# Patient Record
Sex: FEMALE | Race: WHITE | URBAN - METROPOLITAN AREA
[De-identification: names, ages, dates, MRNs, and addresses within clinical notes are randomized per-mention and may not be internally consistent; named-entity substitution may affect disease eponyms.]

---

## 2018-04-30 ENCOUNTER — EMERGENCY (EMERGENCY)
Facility: HOSPITAL | Age: 83
LOS: 0 days | Discharge: HOME | End: 2018-04-30
Attending: EMERGENCY MEDICINE | Admitting: EMERGENCY MEDICINE
Payer: MEDICARE

## 2018-04-30 VITALS
TEMPERATURE: 98 F | OXYGEN SATURATION: 100 % | DIASTOLIC BLOOD PRESSURE: 68 MMHG | HEART RATE: 89 BPM | SYSTOLIC BLOOD PRESSURE: 152 MMHG

## 2018-04-30 VITALS — WEIGHT: 100.09 LBS

## 2018-04-30 DIAGNOSIS — I10 ESSENTIAL (PRIMARY) HYPERTENSION: ICD-10-CM

## 2018-04-30 DIAGNOSIS — K59.00 CONSTIPATION, UNSPECIFIED: ICD-10-CM

## 2018-04-30 DIAGNOSIS — K62.89 OTHER SPECIFIED DISEASES OF ANUS AND RECTUM: ICD-10-CM

## 2018-04-30 PROCEDURE — 99285 EMERGENCY DEPT VISIT HI MDM: CPT

## 2018-04-30 RX ORDER — MULTIVIT WITH MIN/MFOLATE/K2 340-15/3 G
50 POWDER (GRAM) ORAL ONCE
Qty: 0 | Refills: 0 | Status: COMPLETED | OUTPATIENT
Start: 2018-04-30 | End: 2018-04-30

## 2018-04-30 RX ORDER — MORPHINE SULFATE 50 MG/1
4 CAPSULE, EXTENDED RELEASE ORAL ONCE
Qty: 0 | Refills: 0 | Status: DISCONTINUED | OUTPATIENT
Start: 2018-04-30 | End: 2018-04-30

## 2018-04-30 RX ORDER — MINERAL OIL
133 OIL (ML) MISCELLANEOUS ONCE
Qty: 0 | Refills: 0 | Status: COMPLETED | OUTPATIENT
Start: 2018-04-30 | End: 2018-04-30

## 2018-04-30 RX ADMIN — Medication 133 MILLILITER(S): at 12:15

## 2018-04-30 RX ADMIN — MORPHINE SULFATE 4 MILLIGRAM(S): 50 CAPSULE, EXTENDED RELEASE ORAL at 14:19

## 2018-04-30 RX ADMIN — Medication 50 MILLILITER(S): at 15:38

## 2018-04-30 NOTE — ED ADULT NURSE NOTE - OBJECTIVE STATEMENT
C/O constipation since last Thursday. Patient reports she had a mineral oil enema without positive effect.

## 2018-04-30 NOTE — ED PROVIDER NOTE - MEDICAL DECISION MAKING DETAILS
82 y/o F c/o worsening constipation over the past x1 week with plans to attempt rectal exam with disimpaction

## 2018-04-30 NOTE — ED PROVIDER NOTE - OBJECTIVE STATEMENT
82 y/o F with a PMHx of HTN on Valsartan, external and internal hemorrhoids, s/p colonoscopy (last 2-3 years ago) presents to the ED c/o constipation with rectal pain x3 days. Pt states x3-4 days ago she had onset of constipation with external and internal hemorrhoids which have been worsening, and still has not been able to have a BM. Pt states that she is experiencing severe rectal pain, unable to stand up straight of ambulate secondary to pain. Pt states that she feels pressure when she stands with sensation to have BM, attempts with no alleviation. Pt currently calm, no distress and denies fever, cough, chills, NV, SOB, CP or any other acute c/o at this time. PHILIP Bowie.

## 2018-04-30 NOTE — ED PROVIDER NOTE - GASTROINTESTINAL, MLM
Abdomen soft, non-tender, no guarding. Abdomen soft, non-tender, no guarding. Large amt of soft brown stool in vault. rectum inflamed.

## 2018-04-30 NOTE — ED PROVIDER NOTE - PROGRESS NOTE DETAILS
Rectal exam, Guaiac Rectal exam, Guaiac negative md Sophie pt manually disempacted, followed by enema. MD Trish given morphine and second rectal disempaction done. more stool obtained. pt mart proc well MD Trish

## 2018-04-30 NOTE — ED STATDOCS - PROGRESS NOTE DETAILS
Renetta CONNER for ED attending, Dr. Mccartney: 82 y/o female with a PMHx of HTN on Valsartan, external and internal hemorrhoids, s/p colonoscopy (last 2-3 years ago) presents to the ED c/o constipation with rectal pain x3 days. Pt states 3-4 days ago she had onset of constipation with external and internal hemorrhoids. Since, pt's hemorrhoids have been worsening, and still has not been able to have a BM. +rectal pain. Pt is unable to walk due to rectal pain. Pt took stool softeners. On Baclofen for hand cramping. Allergic to Sulfa, Penicillin. PMD: Dr. Herbert Bowie.

## 2018-06-18 PROBLEM — Z00.00 ENCOUNTER FOR PREVENTIVE HEALTH EXAMINATION: Status: ACTIVE | Noted: 2018-06-18

## 2018-06-19 ENCOUNTER — OUTPATIENT (OUTPATIENT)
Dept: OUTPATIENT SERVICES | Facility: HOSPITAL | Age: 83
LOS: 1 days | End: 2018-06-19
Payer: MEDICARE

## 2018-06-19 ENCOUNTER — APPOINTMENT (OUTPATIENT)
Dept: CT IMAGING | Facility: CLINIC | Age: 83
End: 2018-06-19
Payer: MEDICARE

## 2018-06-19 DIAGNOSIS — Z00.8 ENCOUNTER FOR OTHER GENERAL EXAMINATION: ICD-10-CM

## 2018-06-19 PROCEDURE — 74176 CT ABD & PELVIS W/O CONTRAST: CPT

## 2018-06-19 PROCEDURE — 74176 CT ABD & PELVIS W/O CONTRAST: CPT | Mod: 26

## 2019-04-11 PROBLEM — I10 ESSENTIAL (PRIMARY) HYPERTENSION: Chronic | Status: ACTIVE | Noted: 2018-04-30

## 2019-04-11 PROBLEM — K64.9 UNSPECIFIED HEMORRHOIDS: Chronic | Status: ACTIVE | Noted: 2018-04-30

## 2019-05-07 ENCOUNTER — RECORD ABSTRACTING (OUTPATIENT)
Age: 84
End: 2019-05-07

## 2019-05-07 DIAGNOSIS — K58.9 IRRITABLE BOWEL SYNDROME W/OUT DIARRHEA: ICD-10-CM

## 2019-05-07 DIAGNOSIS — I99.8 OTHER DISORDER OF CIRCULATORY SYSTEM: ICD-10-CM

## 2019-05-07 DIAGNOSIS — Z80.0 FAMILY HISTORY OF MALIGNANT NEOPLASM OF DIGESTIVE ORGANS: ICD-10-CM

## 2019-05-07 DIAGNOSIS — J18.9 PNEUMONIA, UNSPECIFIED ORGANISM: ICD-10-CM

## 2019-05-07 DIAGNOSIS — M54.9 DORSALGIA, UNSPECIFIED: ICD-10-CM

## 2019-05-07 DIAGNOSIS — Z83.3 FAMILY HISTORY OF DIABETES MELLITUS: ICD-10-CM

## 2019-05-07 DIAGNOSIS — Z78.9 OTHER SPECIFIED HEALTH STATUS: ICD-10-CM

## 2019-05-07 DIAGNOSIS — K52.9 NONINFECTIVE GASTROENTERITIS AND COLITIS, UNSPECIFIED: ICD-10-CM

## 2019-05-07 DIAGNOSIS — C44.90 UNSPECIFIED MALIGNANT NEOPLASM OF SKIN, UNSPECIFIED: ICD-10-CM

## 2019-05-07 DIAGNOSIS — Z87.19 PERSONAL HISTORY OF OTHER DISEASES OF THE DIGESTIVE SYSTEM: ICD-10-CM

## 2019-05-07 DIAGNOSIS — Q87.1 CONGENITAL MALFORMATION SYNDROMES PREDOMINANTLY ASSOCIATED WITH SHORT STATURE: ICD-10-CM

## 2019-05-07 DIAGNOSIS — L43.9 LICHEN PLANUS, UNSPECIFIED: ICD-10-CM

## 2019-05-07 DIAGNOSIS — Z80.41 FAMILY HISTORY OF MALIGNANT NEOPLASM OF OVARY: ICD-10-CM

## 2019-05-07 DIAGNOSIS — M19.90 UNSPECIFIED OSTEOARTHRITIS, UNSPECIFIED SITE: ICD-10-CM

## 2019-05-07 DIAGNOSIS — I10 ESSENTIAL (PRIMARY) HYPERTENSION: ICD-10-CM

## 2019-05-07 RX ORDER — ATENOLOL 25 MG/1
25 TABLET ORAL
Refills: 0 | Status: ACTIVE | COMMUNITY

## 2019-05-07 RX ORDER — VALSARTAN 160 MG/1
160 TABLET ORAL
Refills: 0 | Status: ACTIVE | COMMUNITY

## 2019-05-07 RX ORDER — BACLOFEN 10 MG/1
10 TABLET ORAL
Refills: 0 | Status: ACTIVE | COMMUNITY

## 2019-05-07 RX ORDER — POLYETHYLENE GLYCOL 3350, SODIUM SULFATE, SODIUM CHLORIDE, POTASSIUM CHLORIDE, ASCORBIC ACID, SODIUM ASCORBATE 7.5-2.691G
KIT ORAL
Refills: 0 | Status: ACTIVE | COMMUNITY

## 2019-06-03 ENCOUNTER — APPOINTMENT (OUTPATIENT)
Dept: GASTROENTEROLOGY | Facility: CLINIC | Age: 84
End: 2019-06-03
Payer: MEDICARE

## 2019-06-03 VITALS
TEMPERATURE: 98.5 F | SYSTOLIC BLOOD PRESSURE: 194 MMHG | BODY MASS INDEX: 14.66 KG/M2 | HEIGHT: 65 IN | HEART RATE: 85 BPM | DIASTOLIC BLOOD PRESSURE: 85 MMHG | WEIGHT: 88 LBS

## 2019-06-03 DIAGNOSIS — R13.10 DYSPHAGIA, UNSPECIFIED: ICD-10-CM

## 2019-06-03 DIAGNOSIS — C19 MALIGNANT NEOPLASM OF RECTOSIGMOID JUNCTION: ICD-10-CM

## 2019-06-03 DIAGNOSIS — K64.4 RESIDUAL HEMORRHOIDAL SKIN TAGS: ICD-10-CM

## 2019-06-03 DIAGNOSIS — K21.9 GASTRO-ESOPHAGEAL REFLUX DISEASE W/OUT ESOPHAGITIS: ICD-10-CM

## 2019-06-03 PROCEDURE — 99204 OFFICE O/P NEW MOD 45 MIN: CPT

## 2019-06-03 RX ORDER — HYDROCORTISONE ACETATE AND PRAMOXINE HYDROCHLORIDE 25; 10 MG/G; MG/G
2.5-1 CREAM TOPICAL
Qty: 1 | Refills: 3 | Status: ACTIVE | COMMUNITY
Start: 2019-06-03 | End: 1900-01-01

## 2019-06-03 RX ORDER — HYDROCORTISONE 25 MG/G
2.5 OINTMENT TOPICAL
Qty: 2835 | Refills: 0 | Status: ACTIVE | COMMUNITY
Start: 2019-01-22

## 2019-06-03 RX ORDER — PRAMOXINE HYDROCHLORIDE AND HYDROCORTISONE ACETATE 10; 25 MG/G; MG/G
2.5-1 CREAM TOPICAL
Qty: 48 | Refills: 0 | Status: ACTIVE | COMMUNITY
Start: 2018-12-05

## 2019-06-03 RX ORDER — IRBESARTAN 150 MG/1
150 TABLET ORAL
Qty: 60 | Refills: 0 | Status: ACTIVE | COMMUNITY
Start: 2018-12-13

## 2019-06-03 RX ORDER — SODIUM SULFATE, POTASSIUM SULFATE, MAGNESIUM SULFATE 17.5; 3.13; 1.6 G/ML; G/ML; G/ML
17.5-3.13-1.6 SOLUTION, CONCENTRATE ORAL
Qty: 1 | Refills: 0 | Status: ACTIVE | COMMUNITY
Start: 2019-06-03 | End: 1900-01-01

## 2019-06-03 NOTE — REVIEW OF SYSTEMS
[Feeling Tired] : feeling tired [Recent Weight Gain (___ Lbs)] : recent [unfilled] ~Ulb weight gain [Eye Pain] : no eye pain [Red Eyes] : eyes not red [Eyesight Problems] : eyesight problems [Vomiting] : no vomiting [Discharge From Eyes] : no purulent discharge from the eyes [Constipation] : constipation [Diarrhea] : no diarrhea [Melena] : no melena [Heartburn] : heartburn [Joint Pain] : joint pain [Joint Stiffness] : joint stiffness [Limb Pain] : no limb pain [Joint Swelling] : no joint swelling [Confused] : no confusion [Dizziness] : dizziness [Convulsions] : no convulsions [Limb Weakness] : no limb weakness [Difficulty Walking] : difficulty walking [Negative] : Heme/Lymph [FreeTextEntry2] : 20 lbs last year due to stress,  death [FreeTextEntry9] : left hip arthritis

## 2019-06-03 NOTE — HISTORY OF PRESENT ILLNESS
[de-identified] : This is an 84-year-old female referred for evaluation of multiple GI symptoms. #1 she has a strong family history of malignancy, including esophageal and colon cancers in first-degree relatives. She has not had a colonoscopy in nearly 10 years. #2 she notes increasing intermittent dysphagia and odynophagia to large solids. No food has never gotten completely stalk although she does notice some occasional GERD and heartburn. #3 she has a history of increasing constipation with a history of partial obstruction. Currently, she is having regular bowel movements by taking 4 release daily and taking Senokot as needed. #4 she has some very minimal discomfort from known history of internal and external hemorrhoids.

## 2019-06-03 NOTE — ASSESSMENT
[FreeTextEntry1] : This is an 84-year-old female referred for evaluation of multiple GI issues, \par #1 patient with a history in her family of colon cancer in her mother, and she has not had a colonoscopy in nearly 10 years. We will schedule colonoscopy and this has been discussed with her in detail. \par #2 constipation - She is currently managing her symptoms well with over-the-counter regimen. She does not wish to pursue further medication therapy at this time, although we did discuss Linzess etc.\par . #3 Bloating and has -  we have discussed the possibility of her adding Iberogast to her regimen. #4 dysphagia. We've agreed to schedule an upper endoscopy to further evaluate, as she also has a history of esophageal cancer in her family. She does note intermittent GERD as well.

## 2019-06-03 NOTE — PHYSICAL EXAM
[Sclera] : the sclera and conjunctiva were normal [Extraocular Movements] : extraocular movements were intact [PERRL With Normal Accommodation] : pupils were equal in size, round, and reactive to light [Outer Ear] : the ears and nose were normal in appearance [Oropharynx] : the oropharynx was normal [Neck Appearance] : the appearance of the neck was normal [Neck Cervical Mass (___cm)] : no neck mass was observed [Jugular Venous Distention Increased] : there was no jugular-venous distention [Thyroid Diffuse Enlargement] : the thyroid was not enlarged [Thyroid Nodule] : there were no palpable thyroid nodules [Auscultation Breath Sounds / Voice Sounds] : lungs were clear to auscultation bilaterally [Heart Rate And Rhythm] : heart rate was normal and rhythm regular [Heart Sounds] : normal S1 and S2 [Heart Sounds Gallop] : no gallops [Murmurs] : no murmurs [Heart Sounds Pericardial Friction Rub] : no pericardial rub [Full Pulse] : the pedal pulses are present [Edema] : there was no peripheral edema [Bowel Sounds] : normal bowel sounds [Abdomen Soft] : soft [Abdomen Tenderness] : non-tender [Abdomen Mass (___ Cm)] : no abdominal mass palpated [Abnormal Walk] : normal gait [Nail Clubbing] : no clubbing  or cyanosis of the fingernails [Musculoskeletal - Swelling] : no joint swelling seen [Motor Tone] : muscle strength and tone were normal [FreeTextEntry1] : slow gait, currently steady [Skin Color & Pigmentation] : normal skin color and pigmentation [Skin Turgor] : normal skin turgor [] : no rash [Oriented To Time, Place, And Person] : oriented to person, place, and time [Affect] : the affect was normal [Impaired Insight] : insight and judgment were intact

## 2019-09-05 ENCOUNTER — APPOINTMENT (OUTPATIENT)
Dept: NEUROSURGERY | Facility: CLINIC | Age: 84
End: 2019-09-05
Payer: MEDICARE

## 2019-09-05 VITALS
WEIGHT: 89 LBS | HEART RATE: 78 BPM | OXYGEN SATURATION: 95 % | SYSTOLIC BLOOD PRESSURE: 193 MMHG | HEIGHT: 65 IN | RESPIRATION RATE: 18 BRPM | TEMPERATURE: 98.2 F | DIASTOLIC BLOOD PRESSURE: 99 MMHG | BODY MASS INDEX: 14.83 KG/M2

## 2019-09-05 PROCEDURE — 99205 OFFICE O/P NEW HI 60 MIN: CPT

## 2019-09-05 NOTE — PHYSICAL EXAM
[General Appearance - Alert] : alert [General Appearance - In No Acute Distress] : in no acute distress [Person] : oriented to person [Place] : oriented to place [Motor Strength] : muscle strength was normal in all four extremities [Time] : oriented to time [Involuntary Movements] : no involuntary movements were seen [Abnormal Walk] : normal gait [] : no respiratory distress

## 2019-09-09 NOTE — HISTORY OF PRESENT ILLNESS
[de-identified] : Kathy Rodriguez is an 84yr old right handed female here for a new patient visit. On August 2, 2019 she has severe hypertension with systolic in the 200s with associated flashing lights and went to the hospital. Work up there included MRI and MRA brain. The MRA brain was significant for a cerebral aneurysm and she was referred to us for further management. No family history of cerebral aneurysm and no social history of smoking. \par Neurologist: Dr. Lucas Soni

## 2019-09-09 NOTE — ASSESSMENT
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 Herbert Ramirez\par Ok to exercise\par NO neurosurgical contraindication to proceeding with other medical concern such as endoscopy and colonoscopy \par

## 2019-09-09 NOTE — REVIEW OF SYSTEMS
[Feeling Tired] : feeling tired [Numbness] : numbness [Abnormal Sensation] : an abnormal sensation [Tingling] : tingling [Dizziness] : dizziness [Negative] : Heme/Lymph [de-identified] : headaches

## 2020-10-14 ENCOUNTER — INPATIENT (INPATIENT)
Facility: HOSPITAL | Age: 85
LOS: 10 days | Discharge: HOME HEALTH | DRG: 121 | End: 2020-10-25
Attending: THORACIC SURGERY (CARDIOTHORACIC VASCULAR SURGERY) | Admitting: SURGERY
Payer: COMMERCIAL

## 2020-10-14 VITALS
DIASTOLIC BLOOD PRESSURE: 85 MMHG | SYSTOLIC BLOOD PRESSURE: 212 MMHG | TEMPERATURE: 99 F | OXYGEN SATURATION: 100 % | WEIGHT: 100.09 LBS | HEART RATE: 85 BPM | RESPIRATION RATE: 19 BRPM

## 2020-10-14 DIAGNOSIS — S22.20XA UNSPECIFIED FRACTURE OF STERNUM, INITIAL ENCOUNTER FOR CLOSED FRACTURE: ICD-10-CM

## 2020-10-14 LAB
ALBUMIN SERPL ELPH-MCNC: 3.7 G/DL — SIGNIFICANT CHANGE UP (ref 3.3–5)
ALP SERPL-CCNC: 72 U/L — SIGNIFICANT CHANGE UP (ref 40–120)
ALT FLD-CCNC: 52 U/L — SIGNIFICANT CHANGE UP (ref 12–78)
ANION GAP SERPL CALC-SCNC: 5 MMOL/L — SIGNIFICANT CHANGE UP (ref 5–17)
APPEARANCE UR: CLEAR — SIGNIFICANT CHANGE UP
APTT BLD: 28.7 SEC — SIGNIFICANT CHANGE UP (ref 27.5–35.5)
AST SERPL-CCNC: 56 U/L — HIGH (ref 15–37)
BASOPHILS # BLD AUTO: 0.06 K/UL — SIGNIFICANT CHANGE UP (ref 0–0.2)
BASOPHILS NFR BLD AUTO: 0.7 % — SIGNIFICANT CHANGE UP (ref 0–2)
BILIRUB SERPL-MCNC: 0.4 MG/DL — SIGNIFICANT CHANGE UP (ref 0.2–1.2)
BILIRUB UR-MCNC: NEGATIVE — SIGNIFICANT CHANGE UP
BUN SERPL-MCNC: 17 MG/DL — SIGNIFICANT CHANGE UP (ref 7–23)
CALCIUM SERPL-MCNC: 9.6 MG/DL — SIGNIFICANT CHANGE UP (ref 8.5–10.1)
CHLORIDE SERPL-SCNC: 104 MMOL/L — SIGNIFICANT CHANGE UP (ref 96–108)
CO2 SERPL-SCNC: 29 MMOL/L — SIGNIFICANT CHANGE UP (ref 22–31)
COLOR SPEC: YELLOW — SIGNIFICANT CHANGE UP
CREAT SERPL-MCNC: 0.82 MG/DL — SIGNIFICANT CHANGE UP (ref 0.5–1.3)
DIFF PNL FLD: NEGATIVE — SIGNIFICANT CHANGE UP
EOSINOPHIL # BLD AUTO: 0.15 K/UL — SIGNIFICANT CHANGE UP (ref 0–0.5)
EOSINOPHIL NFR BLD AUTO: 1.7 % — SIGNIFICANT CHANGE UP (ref 0–6)
GLUCOSE SERPL-MCNC: 121 MG/DL — HIGH (ref 70–99)
GLUCOSE UR QL: NEGATIVE MG/DL — SIGNIFICANT CHANGE UP
HCT VFR BLD CALC: 37.4 % — SIGNIFICANT CHANGE UP (ref 34.5–45)
HGB BLD-MCNC: 12.2 G/DL — SIGNIFICANT CHANGE UP (ref 11.5–15.5)
IMM GRANULOCYTES NFR BLD AUTO: 1.3 % — SIGNIFICANT CHANGE UP (ref 0–1.5)
INR BLD: 0.99 RATIO — SIGNIFICANT CHANGE UP (ref 0.88–1.16)
KETONES UR-MCNC: NEGATIVE — SIGNIFICANT CHANGE UP
LEUKOCYTE ESTERASE UR-ACNC: NEGATIVE — SIGNIFICANT CHANGE UP
LYMPHOCYTES # BLD AUTO: 1.54 K/UL — SIGNIFICANT CHANGE UP (ref 1–3.3)
LYMPHOCYTES # BLD AUTO: 17.9 % — SIGNIFICANT CHANGE UP (ref 13–44)
MCHC RBC-ENTMCNC: 29.5 PG — SIGNIFICANT CHANGE UP (ref 27–34)
MCHC RBC-ENTMCNC: 32.6 GM/DL — SIGNIFICANT CHANGE UP (ref 32–36)
MCV RBC AUTO: 90.6 FL — SIGNIFICANT CHANGE UP (ref 80–100)
MONOCYTES # BLD AUTO: 0.56 K/UL — SIGNIFICANT CHANGE UP (ref 0–0.9)
MONOCYTES NFR BLD AUTO: 6.5 % — SIGNIFICANT CHANGE UP (ref 2–14)
NEUTROPHILS # BLD AUTO: 6.17 K/UL — SIGNIFICANT CHANGE UP (ref 1.8–7.4)
NEUTROPHILS NFR BLD AUTO: 71.9 % — SIGNIFICANT CHANGE UP (ref 43–77)
NITRITE UR-MCNC: NEGATIVE — SIGNIFICANT CHANGE UP
PH UR: 7 — SIGNIFICANT CHANGE UP (ref 5–8)
PLATELET # BLD AUTO: 178 K/UL — SIGNIFICANT CHANGE UP (ref 150–400)
POTASSIUM SERPL-MCNC: 3.7 MMOL/L — SIGNIFICANT CHANGE UP (ref 3.5–5.3)
POTASSIUM SERPL-SCNC: 3.7 MMOL/L — SIGNIFICANT CHANGE UP (ref 3.5–5.3)
PROT SERPL-MCNC: 6.9 GM/DL — SIGNIFICANT CHANGE UP (ref 6–8.3)
PROT UR-MCNC: NEGATIVE MG/DL — SIGNIFICANT CHANGE UP
PROTHROM AB SERPL-ACNC: 11.6 SEC — SIGNIFICANT CHANGE UP (ref 10.6–13.6)
RBC # BLD: 4.13 M/UL — SIGNIFICANT CHANGE UP (ref 3.8–5.2)
RBC # FLD: 14.4 % — SIGNIFICANT CHANGE UP (ref 10.3–14.5)
SARS-COV-2 RNA SPEC QL NAA+PROBE: SIGNIFICANT CHANGE UP
SODIUM SERPL-SCNC: 138 MMOL/L — SIGNIFICANT CHANGE UP (ref 135–145)
SP GR SPEC: 1.01 — SIGNIFICANT CHANGE UP (ref 1.01–1.02)
TROPONIN I SERPL-MCNC: <0.015 NG/ML — SIGNIFICANT CHANGE UP (ref 0.01–0.04)
UROBILINOGEN FLD QL: NEGATIVE MG/DL — SIGNIFICANT CHANGE UP
WBC # BLD: 8.59 K/UL — SIGNIFICANT CHANGE UP (ref 3.8–10.5)
WBC # FLD AUTO: 8.59 K/UL — SIGNIFICANT CHANGE UP (ref 3.8–10.5)

## 2020-10-14 PROCEDURE — 84484 ASSAY OF TROPONIN QUANT: CPT

## 2020-10-14 PROCEDURE — C1889: CPT

## 2020-10-14 PROCEDURE — 71045 X-RAY EXAM CHEST 1 VIEW: CPT

## 2020-10-14 PROCEDURE — 74177 CT ABD & PELVIS W/CONTRAST: CPT | Mod: 26

## 2020-10-14 PROCEDURE — C1713: CPT

## 2020-10-14 PROCEDURE — 36415 COLL VENOUS BLD VENIPUNCTURE: CPT

## 2020-10-14 PROCEDURE — 93010 ELECTROCARDIOGRAM REPORT: CPT

## 2020-10-14 PROCEDURE — 72125 CT NECK SPINE W/O DYE: CPT | Mod: 26

## 2020-10-14 PROCEDURE — 85025 COMPLETE CBC W/AUTO DIFF WBC: CPT

## 2020-10-14 PROCEDURE — 94640 AIRWAY INHALATION TREATMENT: CPT

## 2020-10-14 PROCEDURE — 97116 GAIT TRAINING THERAPY: CPT | Mod: GP

## 2020-10-14 PROCEDURE — 80048 BASIC METABOLIC PNL TOTAL CA: CPT

## 2020-10-14 PROCEDURE — 97162 PT EVAL MOD COMPLEX 30 MIN: CPT | Mod: GP

## 2020-10-14 PROCEDURE — 99223 1ST HOSP IP/OBS HIGH 75: CPT

## 2020-10-14 PROCEDURE — 99253 IP/OBS CNSLTJ NEW/EST LOW 45: CPT | Mod: 57

## 2020-10-14 PROCEDURE — 97530 THERAPEUTIC ACTIVITIES: CPT | Mod: GP

## 2020-10-14 PROCEDURE — 85610 PROTHROMBIN TIME: CPT

## 2020-10-14 PROCEDURE — 70450 CT HEAD/BRAIN W/O DYE: CPT | Mod: 26

## 2020-10-14 PROCEDURE — C9113: CPT

## 2020-10-14 PROCEDURE — 93306 TTE W/DOPPLER COMPLETE: CPT

## 2020-10-14 PROCEDURE — 86769 SARS-COV-2 COVID-19 ANTIBODY: CPT

## 2020-10-14 PROCEDURE — 80053 COMPREHEN METABOLIC PANEL: CPT

## 2020-10-14 PROCEDURE — 90715 TDAP VACCINE 7 YRS/> IM: CPT

## 2020-10-14 PROCEDURE — 97163 PT EVAL HIGH COMPLEX 45 MIN: CPT | Mod: GP

## 2020-10-14 PROCEDURE — 85730 THROMBOPLASTIN TIME PARTIAL: CPT

## 2020-10-14 PROCEDURE — 93306 TTE W/DOPPLER COMPLETE: CPT | Mod: 26

## 2020-10-14 PROCEDURE — 93005 ELECTROCARDIOGRAM TRACING: CPT

## 2020-10-14 PROCEDURE — 85027 COMPLETE CBC AUTOMATED: CPT

## 2020-10-14 PROCEDURE — 71260 CT THORAX DX C+: CPT | Mod: 26

## 2020-10-14 RX ORDER — LIDOCAINE 4 G/100G
1 CREAM TOPICAL EVERY 24 HOURS
Refills: 0 | Status: DISCONTINUED | OUTPATIENT
Start: 2020-10-14 | End: 2020-10-16

## 2020-10-14 RX ORDER — SODIUM CHLORIDE 9 MG/ML
1000 INJECTION INTRAMUSCULAR; INTRAVENOUS; SUBCUTANEOUS
Refills: 0 | Status: DISCONTINUED | OUTPATIENT
Start: 2020-10-14 | End: 2020-10-16

## 2020-10-14 RX ORDER — IBUPROFEN 200 MG
600 TABLET ORAL EVERY 8 HOURS
Refills: 0 | Status: DISCONTINUED | OUTPATIENT
Start: 2020-10-14 | End: 2020-10-14

## 2020-10-14 RX ORDER — ACETAMINOPHEN 500 MG
650 TABLET ORAL ONCE
Refills: 0 | Status: COMPLETED | OUTPATIENT
Start: 2020-10-14 | End: 2020-10-14

## 2020-10-14 RX ORDER — MORPHINE SULFATE 50 MG/1
2 CAPSULE, EXTENDED RELEASE ORAL EVERY 6 HOURS
Refills: 0 | Status: DISCONTINUED | OUTPATIENT
Start: 2020-10-14 | End: 2020-10-16

## 2020-10-14 RX ORDER — ACETAMINOPHEN 500 MG
650 TABLET ORAL EVERY 4 HOURS
Refills: 0 | Status: DISCONTINUED | OUTPATIENT
Start: 2020-10-14 | End: 2020-10-16

## 2020-10-14 RX ORDER — HEPARIN SODIUM 5000 [USP'U]/ML
5000 INJECTION INTRAVENOUS; SUBCUTANEOUS EVERY 8 HOURS
Refills: 0 | Status: DISCONTINUED | OUTPATIENT
Start: 2020-10-14 | End: 2020-10-16

## 2020-10-14 RX ORDER — TETANUS TOXOID, REDUCED DIPHTHERIA TOXOID AND ACELLULAR PERTUSSIS VACCINE, ADSORBED 5; 2.5; 8; 8; 2.5 [IU]/.5ML; [IU]/.5ML; UG/.5ML; UG/.5ML; UG/.5ML
0.5 SUSPENSION INTRAMUSCULAR ONCE
Refills: 0 | Status: COMPLETED | OUTPATIENT
Start: 2020-10-14 | End: 2020-10-14

## 2020-10-14 RX ORDER — OXYCODONE HYDROCHLORIDE 5 MG/1
5 TABLET ORAL EVERY 4 HOURS
Refills: 0 | Status: DISCONTINUED | OUTPATIENT
Start: 2020-10-14 | End: 2020-10-16

## 2020-10-14 RX ORDER — HYDRALAZINE HCL 50 MG
10 TABLET ORAL ONCE
Refills: 0 | Status: COMPLETED | OUTPATIENT
Start: 2020-10-14 | End: 2020-10-14

## 2020-10-14 RX ORDER — SODIUM CHLORIDE 9 MG/ML
1000 INJECTION INTRAMUSCULAR; INTRAVENOUS; SUBCUTANEOUS ONCE
Refills: 0 | Status: COMPLETED | OUTPATIENT
Start: 2020-10-14 | End: 2020-10-14

## 2020-10-14 RX ORDER — IBUPROFEN 200 MG
400 TABLET ORAL EVERY 4 HOURS
Refills: 0 | Status: DISCONTINUED | OUTPATIENT
Start: 2020-10-14 | End: 2020-10-15

## 2020-10-14 RX ORDER — ACETAMINOPHEN 500 MG
1000 TABLET ORAL ONCE
Refills: 0 | Status: DISCONTINUED | OUTPATIENT
Start: 2020-10-14 | End: 2020-10-15

## 2020-10-14 RX ORDER — IBUPROFEN 200 MG
200 TABLET ORAL EVERY 4 HOURS
Refills: 0 | Status: DISCONTINUED | OUTPATIENT
Start: 2020-10-14 | End: 2020-10-14

## 2020-10-14 RX ADMIN — TETANUS TOXOID, REDUCED DIPHTHERIA TOXOID AND ACELLULAR PERTUSSIS VACCINE, ADSORBED 0.5 MILLILITER(S): 5; 2.5; 8; 8; 2.5 SUSPENSION INTRAMUSCULAR at 13:18

## 2020-10-14 RX ADMIN — SODIUM CHLORIDE 50 MILLILITER(S): 9 INJECTION INTRAMUSCULAR; INTRAVENOUS; SUBCUTANEOUS at 19:53

## 2020-10-14 RX ADMIN — Medication 400 MILLIGRAM(S): at 23:48

## 2020-10-14 RX ADMIN — Medication 650 MILLIGRAM(S): at 13:19

## 2020-10-14 RX ADMIN — SODIUM CHLORIDE 1000 MILLILITER(S): 9 INJECTION INTRAMUSCULAR; INTRAVENOUS; SUBCUTANEOUS at 13:21

## 2020-10-14 RX ADMIN — Medication 600 MILLIGRAM(S): at 14:16

## 2020-10-14 RX ADMIN — LIDOCAINE 1 PATCH: 4 CREAM TOPICAL at 14:17

## 2020-10-14 RX ADMIN — Medication 10 MILLIGRAM(S): at 17:31
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
## 2020-10-14 NOTE — ED ADULT NURSE NOTE - NSIMPLEMENTINTERV_GEN_ALL_ED
Implemented All Fall Risk Interventions:  [place] to call system. Call bell, personal items and telephone within reach. Instruct patient to call for assistance. Room bathroom lighting operational. Non-slip footwear when patient is off stretcher. Physically safe environment: no spills, clutter or unnecessary equipment. Stretcher in lowest position, wheels locked, appropriate side rails in place. Provide visual cue, wrist band, yellow gown, etc. Monitor gait and stability. Monitor for mental status changes and reorient to person, place, and time. Review medications for side effects contributing to fall risk. Reinforce activity limits and safety measures with patient and family.
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
## 2020-10-14 NOTE — H&P ADULT - NSHPPHYSICALEXAM_GEN_ALL_CORE
Vital Signs Last 24 Hrs  T(C): 36.8 (15 Oct 2020 00:00), Max: 37.6 (14 Oct 2020 19:52)  T(F): 98.3 (15 Oct 2020 00:00), Max: 99.7 (14 Oct 2020 19:52)  HR: 91 (15 Oct 2020 00:00) (85 - 102)  BP: 123/46 (15 Oct 2020 00:00) (98/57 - 220/83)  BP(mean): 65 (14 Oct 2020 18:00) (65 - 118)  RR: 19 (15 Oct 2020 00:00) (14 - 22)  SpO2: 100% (15 Oct 2020 00:00) (98% - 100%)    PHYSICAL EXAM:  Constitutional: NAD, GCS: 15/15  AOX3  Eyes:  WNL  ENMT:  WNL  Neck:  WNL, non tender  Back: Non tender  Respiratory: CTABL, Rt chest wall hematoma, skin intact. Chest wall tenderness.  Cardiovascular:  S1+S2+0  Gastrointestinal: Soft, ND , NT  Genitourinary:  WNL  Extremities: NV intact  Vascular:  Intact  Neurological: No focal neurological deficit,  CN, motor and sensory system grossly intact.  Skin: WNL  Musculoskeletal: WNL  Psychiatric: Grossly WNL
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
## 2020-10-14 NOTE — ED ADULT NURSE REASSESSMENT NOTE - NS ED NURSE REASSESS COMMENT FT1
Patient admitted to med surg. Pt resting comfortably in bed at present time. Pt remains very anxious and keeps saying how she does not want to be admitted that she has "too much to do". Pt aware that she needs to stay in the hospital for a couple of days and is amendable to staying. Comfort and safety maintained. Call bell within reach.
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
## 2020-10-14 NOTE — H&P ADULT - ASSESSMENT
85 Y old female with Rt 3,4 rib fracture, left 5 th rib fracture, displaced sternal fracture, Rt Pectoralis muscle hematoma, O2 sat stable, troponin WNL  Multimodal pain control  Neuro checks Q  4  Cervical collar: Cleared   Incentive spirometry  F/U CXR in am   Vitals, IS/OS Q 4  Diet:   ( X) as tolerated ( ) NPO  DVT/GI prophylaxis  Activity:   Ambulate with assistance  PT  Hold A/C  Resume other home med   Medical service consult  CTS consult  ECHO  Cardiology consult  Monitor hematoma.  SW for eventual D/C planning

## 2020-10-14 NOTE — ED PROVIDER NOTE - NS ED ROS FT
Constitutional: No fever or chills  Eyes: No visual changes  HEENT: No throat pain  CV: + chest pain  Resp: No SOB no cough  GI: No abd pain, nausea or vomiting  : No dysuria  MSK: No musculoskeletal pain  Skin: + abrasion to LE  Neuro: No headache

## 2020-10-14 NOTE — H&P ADULT - NSHPLABSRESULTS_GEN_ALL_CORE
Labs:                          12.2   8.59  )-----------( 178      ( 14 Oct 2020 12:25 )             37.4       10-14    138  |  104  |  17  ----------------------------<  121<H>  3.7   |  29  |  0.82    Ca    9.6      14 Oct 2020 12:25    TPro  6.9  /  Alb  3.7  /  TBili  0.4  /  DBili  x   /  AST  56<H>  /  ALT  52  /  AlkPhos  72  10-14      PT/INR - ( 14 Oct 2020 12:25 )   PT: 11.6 sec;   INR: 0.99 ratio         PTT - ( 14 Oct 2020 12:25 )  PTT:28.7 sec    < from: CT Abdomen and Pelvis w/ IV Cont (10.14.20 @ 12:53) >    IMPRESSION:    Severely displaced sternal fracture without retrosternal hematoma.    Nondisplaced right anterior third and fourth rib fractures and left anterior fourth rib fracture. No pleural effusion or pneumothorax.    Right pectoralis muscle contusion, edema, and enlargement without a discrete hematoma.        < from: CT Head No Cont (10.14.20 @ 12:44) >    IMPRESSION:    No acute intracranial findings.      < end of copied text >    < from: CT Cervical Spine No Cont (10.14.20 @ 12:44) >      IMPRESSION:   No vertebral fracture is recognized.  Multilevel degenerative changes of the cervical spine are present.    < end of copied text >
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
## 2020-10-15 DIAGNOSIS — S22.22XK: ICD-10-CM

## 2020-10-15 LAB
ALBUMIN SERPL ELPH-MCNC: 3.4 G/DL — SIGNIFICANT CHANGE UP (ref 3.3–5)
ALP SERPL-CCNC: 62 U/L — SIGNIFICANT CHANGE UP (ref 40–120)
ALT FLD-CCNC: 47 U/L — SIGNIFICANT CHANGE UP (ref 12–78)
ANION GAP SERPL CALC-SCNC: 6 MMOL/L — SIGNIFICANT CHANGE UP (ref 5–17)
APTT BLD: 24.5 SEC — LOW (ref 27.5–35.5)
AST SERPL-CCNC: 53 U/L — HIGH (ref 15–37)
BASOPHILS # BLD AUTO: 0.03 K/UL — SIGNIFICANT CHANGE UP (ref 0–0.2)
BASOPHILS NFR BLD AUTO: 0.3 % — SIGNIFICANT CHANGE UP (ref 0–2)
BILIRUB SERPL-MCNC: 0.5 MG/DL — SIGNIFICANT CHANGE UP (ref 0.2–1.2)
BUN SERPL-MCNC: 15 MG/DL — SIGNIFICANT CHANGE UP (ref 7–23)
CALCIUM SERPL-MCNC: 9.2 MG/DL — SIGNIFICANT CHANGE UP (ref 8.5–10.1)
CHLORIDE SERPL-SCNC: 105 MMOL/L — SIGNIFICANT CHANGE UP (ref 96–108)
CO2 SERPL-SCNC: 28 MMOL/L — SIGNIFICANT CHANGE UP (ref 22–31)
CREAT SERPL-MCNC: 0.82 MG/DL — SIGNIFICANT CHANGE UP (ref 0.5–1.3)
EOSINOPHIL # BLD AUTO: 0.02 K/UL — SIGNIFICANT CHANGE UP (ref 0–0.5)
EOSINOPHIL NFR BLD AUTO: 0.2 % — SIGNIFICANT CHANGE UP (ref 0–6)
GLUCOSE SERPL-MCNC: 116 MG/DL — HIGH (ref 70–99)
HCT VFR BLD CALC: 30.6 % — LOW (ref 34.5–45)
HGB BLD-MCNC: 10.1 G/DL — LOW (ref 11.5–15.5)
IMM GRANULOCYTES NFR BLD AUTO: 0.3 % — SIGNIFICANT CHANGE UP (ref 0–1.5)
INR BLD: 1.05 RATIO — SIGNIFICANT CHANGE UP (ref 0.88–1.16)
LYMPHOCYTES # BLD AUTO: 0.91 K/UL — LOW (ref 1–3.3)
LYMPHOCYTES # BLD AUTO: 10.1 % — LOW (ref 13–44)
MCHC RBC-ENTMCNC: 29.5 PG — SIGNIFICANT CHANGE UP (ref 27–34)
MCHC RBC-ENTMCNC: 33 GM/DL — SIGNIFICANT CHANGE UP (ref 32–36)
MCV RBC AUTO: 89.5 FL — SIGNIFICANT CHANGE UP (ref 80–100)
MONOCYTES # BLD AUTO: 1.06 K/UL — HIGH (ref 0–0.9)
MONOCYTES NFR BLD AUTO: 11.8 % — SIGNIFICANT CHANGE UP (ref 2–14)
NEUTROPHILS # BLD AUTO: 6.93 K/UL — SIGNIFICANT CHANGE UP (ref 1.8–7.4)
NEUTROPHILS NFR BLD AUTO: 77.3 % — HIGH (ref 43–77)
PLATELET # BLD AUTO: 152 K/UL — SIGNIFICANT CHANGE UP (ref 150–400)
POTASSIUM SERPL-MCNC: 3.8 MMOL/L — SIGNIFICANT CHANGE UP (ref 3.5–5.3)
POTASSIUM SERPL-SCNC: 3.8 MMOL/L — SIGNIFICANT CHANGE UP (ref 3.5–5.3)
PROT SERPL-MCNC: 6.5 GM/DL — SIGNIFICANT CHANGE UP (ref 6–8.3)
PROTHROM AB SERPL-ACNC: 12.2 SEC — SIGNIFICANT CHANGE UP (ref 10.6–13.6)
RBC # BLD: 3.42 M/UL — LOW (ref 3.8–5.2)
RBC # FLD: 14.6 % — HIGH (ref 10.3–14.5)
SODIUM SERPL-SCNC: 139 MMOL/L — SIGNIFICANT CHANGE UP (ref 135–145)
WBC # BLD: 8.98 K/UL — SIGNIFICANT CHANGE UP (ref 3.8–10.5)
WBC # FLD AUTO: 8.98 K/UL — SIGNIFICANT CHANGE UP (ref 3.8–10.5)

## 2020-10-15 PROCEDURE — 99232 SBSQ HOSP IP/OBS MODERATE 35: CPT | Mod: 57

## 2020-10-15 PROCEDURE — 71045 X-RAY EXAM CHEST 1 VIEW: CPT | Mod: 26

## 2020-10-15 PROCEDURE — 99232 SBSQ HOSP IP/OBS MODERATE 35: CPT

## 2020-10-15 PROCEDURE — 93010 ELECTROCARDIOGRAM REPORT: CPT

## 2020-10-15 RX ORDER — ACETAMINOPHEN 500 MG
650 TABLET ORAL EVERY 6 HOURS
Refills: 0 | Status: COMPLETED | OUTPATIENT
Start: 2020-10-15 | End: 2020-10-15

## 2020-10-15 RX ORDER — FLUTICASONE PROPIONATE 50 MCG
1 SPRAY, SUSPENSION NASAL
Refills: 0 | Status: DISCONTINUED | OUTPATIENT
Start: 2020-10-15 | End: 2020-10-16

## 2020-10-15 RX ORDER — AMLODIPINE BESYLATE 2.5 MG/1
5 TABLET ORAL DAILY
Refills: 0 | Status: DISCONTINUED | OUTPATIENT
Start: 2020-10-15 | End: 2020-10-16

## 2020-10-15 RX ORDER — BACLOFEN 100 %
10 POWDER (GRAM) MISCELLANEOUS DAILY
Refills: 0 | Status: DISCONTINUED | OUTPATIENT
Start: 2020-10-15 | End: 2020-10-16

## 2020-10-15 RX ORDER — ATENOLOL 25 MG/1
25 TABLET ORAL DAILY
Refills: 0 | Status: DISCONTINUED | OUTPATIENT
Start: 2020-10-15 | End: 2020-10-16

## 2020-10-15 RX ORDER — ACETAMINOPHEN 500 MG
1000 TABLET ORAL EVERY 6 HOURS
Refills: 0 | Status: DISCONTINUED | OUTPATIENT
Start: 2020-10-15 | End: 2020-10-15

## 2020-10-15 RX ORDER — LORATADINE 10 MG/1
10 TABLET ORAL DAILY
Refills: 0 | Status: DISCONTINUED | OUTPATIENT
Start: 2020-10-15 | End: 2020-10-16

## 2020-10-15 RX ORDER — DIAZEPAM 5 MG
2.5 TABLET ORAL
Refills: 0 | Status: DISCONTINUED | OUTPATIENT
Start: 2020-10-15 | End: 2020-10-16

## 2020-10-15 RX ADMIN — Medication 400 MILLIGRAM(S): at 08:21

## 2020-10-15 RX ADMIN — Medication 260 MILLIGRAM(S): at 18:01

## 2020-10-15 RX ADMIN — Medication 1000 MILLIGRAM(S): at 13:23

## 2020-10-15 RX ADMIN — Medication 260 MILLIGRAM(S): at 23:11

## 2020-10-15 RX ADMIN — LORATADINE 10 MILLIGRAM(S): 10 TABLET ORAL at 12:56

## 2020-10-15 RX ADMIN — Medication 400 MILLIGRAM(S): at 05:52

## 2020-10-15 RX ADMIN — Medication 10 MILLIGRAM(S): at 12:56

## 2020-10-15 RX ADMIN — LIDOCAINE 1 PATCH: 4 CREAM TOPICAL at 05:00

## 2020-10-15 RX ADMIN — Medication 400 MILLIGRAM(S): at 13:01

## 2020-10-15 RX ADMIN — Medication 650 MILLIGRAM(S): at 19:04

## 2020-10-15 RX ADMIN — HEPARIN SODIUM 5000 UNIT(S): 5000 INJECTION INTRAVENOUS; SUBCUTANEOUS at 21:57

## 2020-10-15 RX ADMIN — HEPARIN SODIUM 5000 UNIT(S): 5000 INJECTION INTRAVENOUS; SUBCUTANEOUS at 13:01

## 2020-10-15 RX ADMIN — ATENOLOL 25 MILLIGRAM(S): 25 TABLET ORAL at 12:56
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
## 2020-10-15 NOTE — CHART NOTE - NSCHARTNOTEFT_GEN_A_CORE
Pt to go to OR tomorrow for sternal fixation w Dr. Ramirez.   Requested medical clearance and cardiac clearance for procedure planned for 10/16 AM.  Dr. Anne had long discussions w pt last 2 days and Dr. Ramirez spoke w son today via phone.  Labs reviewed, T and S current.  NPO p MN
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
## 2020-10-15 NOTE — PHARMACOTHERAPY INTERVENTION NOTE - COMMENTS
Recommended reducing dose to 650 mg (weight-based) as pt is malnourished/underweight. Recommended reducing dose to 650 mg (weight-based) as pt is malnourished/underweight. D/w MD and pharmacy.

## 2020-10-16 ENCOUNTER — APPOINTMENT (OUTPATIENT)
Dept: THORACIC SURGERY | Facility: HOSPITAL | Age: 85
End: 2020-10-16

## 2020-10-16 LAB
ADD ON TEST-SPECIMEN IN LAB: SIGNIFICANT CHANGE UP
ANION GAP SERPL CALC-SCNC: 5 MMOL/L — SIGNIFICANT CHANGE UP (ref 5–17)
BASOPHILS # BLD AUTO: 0.07 K/UL — SIGNIFICANT CHANGE UP (ref 0–0.2)
BASOPHILS NFR BLD AUTO: 1 % — SIGNIFICANT CHANGE UP (ref 0–2)
BUN SERPL-MCNC: 14 MG/DL — SIGNIFICANT CHANGE UP (ref 7–23)
CALCIUM SERPL-MCNC: 9 MG/DL — SIGNIFICANT CHANGE UP (ref 8.5–10.1)
CHLORIDE SERPL-SCNC: 108 MMOL/L — SIGNIFICANT CHANGE UP (ref 96–108)
CO2 SERPL-SCNC: 25 MMOL/L — SIGNIFICANT CHANGE UP (ref 22–31)
CREAT SERPL-MCNC: 0.66 MG/DL — SIGNIFICANT CHANGE UP (ref 0.5–1.3)
EOSINOPHIL # BLD AUTO: 0.07 K/UL — SIGNIFICANT CHANGE UP (ref 0–0.5)
EOSINOPHIL NFR BLD AUTO: 1 % — SIGNIFICANT CHANGE UP (ref 0–6)
GLUCOSE SERPL-MCNC: 86 MG/DL — SIGNIFICANT CHANGE UP (ref 70–99)
HCT VFR BLD CALC: 33.1 % — LOW (ref 34.5–45)
HGB BLD-MCNC: 10.8 G/DL — LOW (ref 11.5–15.5)
IMM GRANULOCYTES NFR BLD AUTO: 0.3 % — SIGNIFICANT CHANGE UP (ref 0–1.5)
LYMPHOCYTES # BLD AUTO: 1.12 K/UL — SIGNIFICANT CHANGE UP (ref 1–3.3)
LYMPHOCYTES # BLD AUTO: 15.2 % — SIGNIFICANT CHANGE UP (ref 13–44)
MCHC RBC-ENTMCNC: 29.4 PG — SIGNIFICANT CHANGE UP (ref 27–34)
MCHC RBC-ENTMCNC: 32.6 GM/DL — SIGNIFICANT CHANGE UP (ref 32–36)
MCV RBC AUTO: 90.2 FL — SIGNIFICANT CHANGE UP (ref 80–100)
MONOCYTES # BLD AUTO: 0.85 K/UL — SIGNIFICANT CHANGE UP (ref 0–0.9)
MONOCYTES NFR BLD AUTO: 11.5 % — SIGNIFICANT CHANGE UP (ref 2–14)
NEUTROPHILS # BLD AUTO: 5.23 K/UL — SIGNIFICANT CHANGE UP (ref 1.8–7.4)
NEUTROPHILS NFR BLD AUTO: 71 % — SIGNIFICANT CHANGE UP (ref 43–77)
PLATELET # BLD AUTO: 135 K/UL — LOW (ref 150–400)
POTASSIUM SERPL-MCNC: 3.8 MMOL/L — SIGNIFICANT CHANGE UP (ref 3.5–5.3)
POTASSIUM SERPL-SCNC: 3.8 MMOL/L — SIGNIFICANT CHANGE UP (ref 3.5–5.3)
RBC # BLD: 3.67 M/UL — LOW (ref 3.8–5.2)
RBC # FLD: 14.8 % — HIGH (ref 10.3–14.5)
SARS-COV-2 IGG SERPL QL IA: NEGATIVE — SIGNIFICANT CHANGE UP
SARS-COV-2 IGM SERPL IA-ACNC: <0.1 INDEX — SIGNIFICANT CHANGE UP
SODIUM SERPL-SCNC: 138 MMOL/L — SIGNIFICANT CHANGE UP (ref 135–145)
WBC # BLD: 7.36 K/UL — SIGNIFICANT CHANGE UP (ref 3.8–10.5)
WBC # FLD AUTO: 7.36 K/UL — SIGNIFICANT CHANGE UP (ref 3.8–10.5)

## 2020-10-16 PROCEDURE — 99231 SBSQ HOSP IP/OBS SF/LOW 25: CPT

## 2020-10-16 PROCEDURE — 99232 SBSQ HOSP IP/OBS MODERATE 35: CPT | Mod: 25

## 2020-10-16 PROCEDURE — 21825 TREAT STERNUM FRACTURE: CPT | Mod: AS

## 2020-10-16 PROCEDURE — 21825 TREAT STERNUM FRACTURE: CPT

## 2020-10-16 PROCEDURE — 99232 SBSQ HOSP IP/OBS MODERATE 35: CPT

## 2020-10-16 PROCEDURE — 71045 X-RAY EXAM CHEST 1 VIEW: CPT | Mod: 26

## 2020-10-16 PROCEDURE — 71045 X-RAY EXAM CHEST 1 VIEW: CPT | Mod: 26,77

## 2020-10-16 RX ORDER — ACETAMINOPHEN 500 MG
650 TABLET ORAL EVERY 4 HOURS
Refills: 0 | Status: DISCONTINUED | OUTPATIENT
Start: 2020-10-16 | End: 2020-10-25

## 2020-10-16 RX ORDER — AMLODIPINE BESYLATE 2.5 MG/1
5 TABLET ORAL DAILY
Refills: 0 | Status: DISCONTINUED | OUTPATIENT
Start: 2020-10-16 | End: 2020-10-25

## 2020-10-16 RX ORDER — ACETAMINOPHEN 500 MG
1000 TABLET ORAL ONCE
Refills: 0 | Status: COMPLETED | OUTPATIENT
Start: 2020-10-16 | End: 2020-10-16

## 2020-10-16 RX ORDER — OXYCODONE HYDROCHLORIDE 5 MG/1
5 TABLET ORAL EVERY 4 HOURS
Refills: 0 | Status: DISCONTINUED | OUTPATIENT
Start: 2020-10-16 | End: 2020-10-22

## 2020-10-16 RX ORDER — CEFAZOLIN SODIUM 1 G
1000 VIAL (EA) INJECTION EVERY 8 HOURS
Refills: 0 | Status: COMPLETED | OUTPATIENT
Start: 2020-10-16 | End: 2020-10-21

## 2020-10-16 RX ORDER — LOSARTAN POTASSIUM 100 MG/1
25 TABLET, FILM COATED ORAL DAILY
Refills: 0 | Status: DISCONTINUED | OUTPATIENT
Start: 2020-10-16 | End: 2020-10-16

## 2020-10-16 RX ORDER — ATENOLOL 25 MG/1
25 TABLET ORAL DAILY
Refills: 0 | Status: DISCONTINUED | OUTPATIENT
Start: 2020-10-16 | End: 2020-10-25

## 2020-10-16 RX ORDER — FLUTICASONE PROPIONATE 50 MCG
1 SPRAY, SUSPENSION NASAL
Refills: 0 | Status: DISCONTINUED | OUTPATIENT
Start: 2020-10-16 | End: 2020-10-25

## 2020-10-16 RX ORDER — LOSARTAN POTASSIUM 100 MG/1
25 TABLET, FILM COATED ORAL DAILY
Refills: 0 | Status: DISCONTINUED | OUTPATIENT
Start: 2020-10-16 | End: 2020-10-25

## 2020-10-16 RX ORDER — LIDOCAINE 4 G/100G
1 CREAM TOPICAL EVERY 24 HOURS
Refills: 0 | Status: DISCONTINUED | OUTPATIENT
Start: 2020-10-16 | End: 2020-10-25

## 2020-10-16 RX ORDER — MORPHINE SULFATE 50 MG/1
2 CAPSULE, EXTENDED RELEASE ORAL EVERY 6 HOURS
Refills: 0 | Status: DISCONTINUED | OUTPATIENT
Start: 2020-10-16 | End: 2020-10-19

## 2020-10-16 RX ORDER — OXYCODONE HYDROCHLORIDE 5 MG/1
5 TABLET ORAL ONCE
Refills: 0 | Status: DISCONTINUED | OUTPATIENT
Start: 2020-10-16 | End: 2020-10-16

## 2020-10-16 RX ORDER — FENTANYL CITRATE 50 UG/ML
25 INJECTION INTRAVENOUS
Refills: 0 | Status: DISCONTINUED | OUTPATIENT
Start: 2020-10-16 | End: 2020-10-16

## 2020-10-16 RX ORDER — PROCHLORPERAZINE MALEATE 5 MG
10 TABLET ORAL ONCE
Refills: 0 | Status: DISCONTINUED | OUTPATIENT
Start: 2020-10-16 | End: 2020-10-25

## 2020-10-16 RX ORDER — CEFAZOLIN SODIUM 1 G
1000 VIAL (EA) INJECTION EVERY 8 HOURS
Refills: 0 | Status: DISCONTINUED | OUTPATIENT
Start: 2020-10-16 | End: 2020-10-16

## 2020-10-16 RX ORDER — SODIUM CHLORIDE 9 MG/ML
1000 INJECTION, SOLUTION INTRAVENOUS
Refills: 0 | Status: DISCONTINUED | OUTPATIENT
Start: 2020-10-16 | End: 2020-10-16

## 2020-10-16 RX ORDER — ONDANSETRON 8 MG/1
4 TABLET, FILM COATED ORAL ONCE
Refills: 0 | Status: DISCONTINUED | OUTPATIENT
Start: 2020-10-16 | End: 2020-10-16

## 2020-10-16 RX ORDER — LABETALOL HCL 100 MG
5 TABLET ORAL
Refills: 0 | Status: DISCONTINUED | OUTPATIENT
Start: 2020-10-16 | End: 2020-10-25

## 2020-10-16 RX ADMIN — Medication 400 MILLIGRAM(S): at 14:11

## 2020-10-16 RX ADMIN — OXYCODONE HYDROCHLORIDE 5 MILLIGRAM(S): 5 TABLET ORAL at 04:28

## 2020-10-16 RX ADMIN — Medication 650 MILLIGRAM(S): at 00:26

## 2020-10-16 RX ADMIN — HEPARIN SODIUM 5000 UNIT(S): 5000 INJECTION INTRAVENOUS; SUBCUTANEOUS at 06:02

## 2020-10-16 RX ADMIN — Medication 1000 MILLIGRAM(S): at 21:42

## 2020-10-16 RX ADMIN — Medication 5 MILLIGRAM(S): at 13:47

## 2020-10-16 RX ADMIN — OXYCODONE HYDROCHLORIDE 5 MILLIGRAM(S): 5 TABLET ORAL at 03:36

## 2020-10-16 NOTE — DIETITIAN INITIAL EVALUATION ADULT. - PERTINENT LABORATORY DATA
10-16 Na138 mmol/L Glu 86 mg/dL K+ 3.8 mmol/L Cr  0.66 mg/dL BUN 14 mg/dL Phos n/a   Alb n/a   PAB n/a
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
## 2020-10-16 NOTE — PROGRESS NOTE ADULT - ASSESSMENT
A/P  Displaced sternal fracture, s/p ORIF 10/16/20  Right 3-4th rib fractures  Left 4th rib fracture  Iatrogenic right apical pneumothorax  Thoracic surgery management of thoracic pathology  GI/DVT prophylaxis  Pain control  Incentive spirometry  Cont current care and meds
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
## 2020-10-16 NOTE — DIETITIAN INITIAL EVALUATION ADULT. - MALNUTRITION
Pt at high risk for malnutrition however unable to assess 2/2 pt in OR will assess during follow up.
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
## 2020-10-16 NOTE — BRIEF OPERATIVE NOTE - OPERATION/FINDINGS
Displaced sternal body fracture.    During procedure, right pleural cavity was entered.  Known right residual pneumothorax - small.

## 2020-10-16 NOTE — BRIEF OPERATIVE NOTE - NSICDXBRIEFPROCEDURE_GEN_ALL_CORE_FT
PROCEDURES:  Open reduction and internal fixation (ORIF) of fracture of sternum 16-Oct-2020 14:39:26 Pectoralis Muscle Advancement Flap Terri Amin
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
## 2020-10-16 NOTE — DIETITIAN INITIAL EVALUATION ADULT. - PERTINENT MEDS FT
MEDICATIONS  (STANDING):  amLODIPine   Tablet 5 milliGRAM(s) Oral daily  ATENolol  Tablet 25 milliGRAM(s) Oral daily  fluticasone propionate 50 MICROgram(s)/spray Nasal Spray 1 Spray(s) Both Nostrils two times a day  lactated ringers. 1000 milliLiter(s) (100 mL/Hr) IV Continuous <Continuous>  lidocaine   Patch 1 Patch Transdermal every 24 hours  losartan 25 milliGRAM(s) Oral daily    MEDICATIONS  (PRN):  acetaminophen   Tablet .. 650 milliGRAM(s) Oral every 4 hours PRN Temp greater or equal to 38C (100.4F), Mild Pain (1 - 3)  fentaNYL    Injectable 25 MICROGram(s) IV Push every 5 minutes PRN Moderate Pain (4 - 6)  labetalol Injectable 5 milliGRAM(s) IV Push every 10 minutes PRN SBP over 160, DPB over 90, hold for HR less than 60  morphine  - Injectable 2 milliGRAM(s) IV Push every 6 hours PRN Severe Pain (7 - 10)  ondansetron Injectable 4 milliGRAM(s) IV Push once PRN Nausea and/or Vomiting  oxyCODONE    IR 5 milliGRAM(s) Oral once PRN Moderate Pain (4 - 6)  oxyCODONE    IR 5 milliGRAM(s) Oral every 4 hours PRN Moderate Pain (4 - 6)  prochlorperazine   Injectable 10 milliGRAM(s) IV Push once PRN nausea

## 2020-10-16 NOTE — DIETITIAN INITIAL EVALUATION ADULT. - ENTER FROM (ML/KG)
30
Birth Certificate Instructions/Shaken Baby Prevention Handout/Breastfeeding Mother’s Support Group Information/Guide to Postpartum Care/Vaccinations/[facility] Hearing Screen Program/[facility] [place] Screening Program/Back To Sleep Handout
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
## 2020-10-16 NOTE — DIETITIAN INITIAL EVALUATION ADULT. - ADD RECOMMEND
1) Advance po diet when medically feasible (regular). 2) monitor daily weights 3) If unable to consume adequate nutrition consider nutrition support. 4) Maintain aspiration precautions, back of bed >35 degrees. 5) Suggest add MVI w/minerals, Vit C 500 mg BID, add Zinc Sulfate 220 mg x 10 days to promote wound healing. 6) monitor I & O's 7) monitor hydration, labs, and electrolytes replete as needed.

## 2020-10-17 LAB
ANION GAP SERPL CALC-SCNC: 7 MMOL/L — SIGNIFICANT CHANGE UP (ref 5–17)
BASOPHILS # BLD AUTO: 0 K/UL — SIGNIFICANT CHANGE UP (ref 0–0.2)
BASOPHILS NFR BLD AUTO: 0 % — SIGNIFICANT CHANGE UP (ref 0–2)
BUN SERPL-MCNC: 21 MG/DL — SIGNIFICANT CHANGE UP (ref 7–23)
CALCIUM SERPL-MCNC: 8.6 MG/DL — SIGNIFICANT CHANGE UP (ref 8.5–10.1)
CHLORIDE SERPL-SCNC: 106 MMOL/L — SIGNIFICANT CHANGE UP (ref 96–108)
CO2 SERPL-SCNC: 26 MMOL/L — SIGNIFICANT CHANGE UP (ref 22–31)
CREAT SERPL-MCNC: 0.78 MG/DL — SIGNIFICANT CHANGE UP (ref 0.5–1.3)
EOSINOPHIL # BLD AUTO: 0 K/UL — SIGNIFICANT CHANGE UP (ref 0–0.5)
EOSINOPHIL NFR BLD AUTO: 0 % — SIGNIFICANT CHANGE UP (ref 0–6)
GLUCOSE SERPL-MCNC: 117 MG/DL — HIGH (ref 70–99)
HCT VFR BLD CALC: 24.2 % — LOW (ref 34.5–45)
HCT VFR BLD CALC: 24.4 % — LOW (ref 34.5–45)
HGB BLD-MCNC: 7.8 G/DL — LOW (ref 11.5–15.5)
HGB BLD-MCNC: 7.8 G/DL — LOW (ref 11.5–15.5)
LYMPHOCYTES # BLD AUTO: 0.86 K/UL — LOW (ref 1–3.3)
LYMPHOCYTES # BLD AUTO: 7 % — LOW (ref 13–44)
MCHC RBC-ENTMCNC: 29.5 PG — SIGNIFICANT CHANGE UP (ref 27–34)
MCHC RBC-ENTMCNC: 29.5 PG — SIGNIFICANT CHANGE UP (ref 27–34)
MCHC RBC-ENTMCNC: 32 GM/DL — SIGNIFICANT CHANGE UP (ref 32–36)
MCHC RBC-ENTMCNC: 32.2 GM/DL — SIGNIFICANT CHANGE UP (ref 32–36)
MCV RBC AUTO: 91.7 FL — SIGNIFICANT CHANGE UP (ref 80–100)
MCV RBC AUTO: 92.4 FL — SIGNIFICANT CHANGE UP (ref 80–100)
MONOCYTES # BLD AUTO: 1.11 K/UL — HIGH (ref 0–0.9)
MONOCYTES NFR BLD AUTO: 9 % — SIGNIFICANT CHANGE UP (ref 2–14)
NEUTROPHILS # BLD AUTO: 10.36 K/UL — HIGH (ref 1.8–7.4)
NEUTROPHILS NFR BLD AUTO: 79 % — HIGH (ref 43–77)
NRBC # BLD: SIGNIFICANT CHANGE UP /100 WBCS (ref 0–0)
PLATELET # BLD AUTO: 133 K/UL — LOW (ref 150–400)
PLATELET # BLD AUTO: 135 K/UL — LOW (ref 150–400)
POTASSIUM SERPL-MCNC: 3.7 MMOL/L — SIGNIFICANT CHANGE UP (ref 3.5–5.3)
POTASSIUM SERPL-SCNC: 3.7 MMOL/L — SIGNIFICANT CHANGE UP (ref 3.5–5.3)
RBC # BLD: 2.64 M/UL — LOW (ref 3.8–5.2)
RBC # BLD: 2.64 M/UL — LOW (ref 3.8–5.2)
RBC # FLD: 14.6 % — HIGH (ref 10.3–14.5)
RBC # FLD: 14.7 % — HIGH (ref 10.3–14.5)
SODIUM SERPL-SCNC: 139 MMOL/L — SIGNIFICANT CHANGE UP (ref 135–145)
WBC # BLD: 11.76 K/UL — HIGH (ref 3.8–10.5)
WBC # BLD: 12.33 K/UL — HIGH (ref 3.8–10.5)
WBC # FLD AUTO: 11.76 K/UL — HIGH (ref 3.8–10.5)
WBC # FLD AUTO: 12.33 K/UL — HIGH (ref 3.8–10.5)

## 2020-10-17 PROCEDURE — 71045 X-RAY EXAM CHEST 1 VIEW: CPT | Mod: 26

## 2020-10-17 PROCEDURE — 99233 SBSQ HOSP IP/OBS HIGH 50: CPT

## 2020-10-17 PROCEDURE — 99231 SBSQ HOSP IP/OBS SF/LOW 25: CPT

## 2020-10-17 RX ORDER — ONDANSETRON 8 MG/1
4 TABLET, FILM COATED ORAL EVERY 6 HOURS
Refills: 0 | Status: DISCONTINUED | OUTPATIENT
Start: 2020-10-17 | End: 2020-10-22

## 2020-10-17 RX ORDER — ACETAMINOPHEN 500 MG
650 TABLET ORAL ONCE
Refills: 0 | Status: COMPLETED | OUTPATIENT
Start: 2020-10-17 | End: 2020-10-17

## 2020-10-17 RX ORDER — PANTOPRAZOLE SODIUM 20 MG/1
40 TABLET, DELAYED RELEASE ORAL DAILY
Refills: 0 | Status: DISCONTINUED | OUTPATIENT
Start: 2020-10-17 | End: 2020-10-23

## 2020-10-17 RX ORDER — HEPARIN SODIUM 5000 [USP'U]/ML
5000 INJECTION INTRAVENOUS; SUBCUTANEOUS EVERY 8 HOURS
Refills: 0 | Status: DISCONTINUED | OUTPATIENT
Start: 2020-10-18 | End: 2020-10-25

## 2020-10-17 RX ADMIN — Medication 1000 MILLIGRAM(S): at 21:40

## 2020-10-17 RX ADMIN — OXYCODONE HYDROCHLORIDE 5 MILLIGRAM(S): 5 TABLET ORAL at 21:49

## 2020-10-17 RX ADMIN — Medication 1000 MILLIGRAM(S): at 05:55

## 2020-10-17 RX ADMIN — LIDOCAINE 1 PATCH: 4 CREAM TOPICAL at 21:59

## 2020-10-17 RX ADMIN — MORPHINE SULFATE 2 MILLIGRAM(S): 50 CAPSULE, EXTENDED RELEASE ORAL at 06:02

## 2020-10-17 RX ADMIN — LOSARTAN POTASSIUM 25 MILLIGRAM(S): 100 TABLET, FILM COATED ORAL at 09:32

## 2020-10-17 RX ADMIN — Medication 1 SPRAY(S): at 21:42

## 2020-10-17 RX ADMIN — OXYCODONE HYDROCHLORIDE 5 MILLIGRAM(S): 5 TABLET ORAL at 15:05

## 2020-10-17 RX ADMIN — OXYCODONE HYDROCHLORIDE 5 MILLIGRAM(S): 5 TABLET ORAL at 15:35

## 2020-10-17 RX ADMIN — ONDANSETRON 4 MILLIGRAM(S): 8 TABLET, FILM COATED ORAL at 13:09

## 2020-10-17 RX ADMIN — Medication 1000 MILLIGRAM(S): at 13:08

## 2020-10-17 RX ADMIN — Medication 260 MILLIGRAM(S): at 03:44

## 2020-10-17 RX ADMIN — LIDOCAINE 1 PATCH: 4 CREAM TOPICAL at 19:00

## 2020-10-17 RX ADMIN — Medication 5 MILLIGRAM(S): at 04:28

## 2020-10-17 RX ADMIN — Medication 1 SPRAY(S): at 09:32

## 2020-10-17 RX ADMIN — ATENOLOL 25 MILLIGRAM(S): 25 TABLET ORAL at 09:32
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
## 2020-10-18 LAB
HCT VFR BLD CALC: 26.5 % — LOW (ref 34.5–45)
HGB BLD-MCNC: 8.2 G/DL — LOW (ref 11.5–15.5)
MCHC RBC-ENTMCNC: 28.7 PG — SIGNIFICANT CHANGE UP (ref 27–34)
MCHC RBC-ENTMCNC: 30.9 GM/DL — LOW (ref 32–36)
MCV RBC AUTO: 92.7 FL — SIGNIFICANT CHANGE UP (ref 80–100)
PLATELET # BLD AUTO: 143 K/UL — LOW (ref 150–400)
RBC # BLD: 2.86 M/UL — LOW (ref 3.8–5.2)
RBC # FLD: 14.7 % — HIGH (ref 10.3–14.5)
WBC # BLD: 8.72 K/UL — SIGNIFICANT CHANGE UP (ref 3.8–10.5)
WBC # FLD AUTO: 8.72 K/UL — SIGNIFICANT CHANGE UP (ref 3.8–10.5)

## 2020-10-18 PROCEDURE — 71045 X-RAY EXAM CHEST 1 VIEW: CPT | Mod: 26

## 2020-10-18 PROCEDURE — 99231 SBSQ HOSP IP/OBS SF/LOW 25: CPT

## 2020-10-18 PROCEDURE — 99233 SBSQ HOSP IP/OBS HIGH 50: CPT

## 2020-10-18 RX ORDER — SENNA PLUS 8.6 MG/1
2 TABLET ORAL AT BEDTIME
Refills: 0 | Status: DISCONTINUED | OUTPATIENT
Start: 2020-10-18 | End: 2020-10-25

## 2020-10-18 RX ORDER — POLYETHYLENE GLYCOL 3350 17 G/17G
17 POWDER, FOR SOLUTION ORAL ONCE
Refills: 0 | Status: COMPLETED | OUTPATIENT
Start: 2020-10-18 | End: 2020-10-21

## 2020-10-18 RX ADMIN — ONDANSETRON 4 MILLIGRAM(S): 8 TABLET, FILM COATED ORAL at 10:00

## 2020-10-18 RX ADMIN — LIDOCAINE 1 PATCH: 4 CREAM TOPICAL at 21:29

## 2020-10-18 RX ADMIN — SENNA PLUS 2 TABLET(S): 8.6 TABLET ORAL at 21:26

## 2020-10-18 RX ADMIN — PANTOPRAZOLE SODIUM 40 MILLIGRAM(S): 20 TABLET, DELAYED RELEASE ORAL at 10:00

## 2020-10-18 RX ADMIN — OXYCODONE HYDROCHLORIDE 5 MILLIGRAM(S): 5 TABLET ORAL at 18:55

## 2020-10-18 RX ADMIN — LIDOCAINE 1 PATCH: 4 CREAM TOPICAL at 10:01

## 2020-10-18 RX ADMIN — HEPARIN SODIUM 5000 UNIT(S): 5000 INJECTION INTRAVENOUS; SUBCUTANEOUS at 06:02

## 2020-10-18 RX ADMIN — OXYCODONE HYDROCHLORIDE 5 MILLIGRAM(S): 5 TABLET ORAL at 14:30

## 2020-10-18 RX ADMIN — Medication 1000 MILLIGRAM(S): at 06:02

## 2020-10-18 RX ADMIN — LIDOCAINE 1 PATCH: 4 CREAM TOPICAL at 07:15

## 2020-10-18 RX ADMIN — HEPARIN SODIUM 5000 UNIT(S): 5000 INJECTION INTRAVENOUS; SUBCUTANEOUS at 14:48

## 2020-10-18 RX ADMIN — ATENOLOL 25 MILLIGRAM(S): 25 TABLET ORAL at 10:00

## 2020-10-18 RX ADMIN — Medication 1 SPRAY(S): at 10:01

## 2020-10-18 RX ADMIN — OXYCODONE HYDROCHLORIDE 5 MILLIGRAM(S): 5 TABLET ORAL at 08:30

## 2020-10-18 RX ADMIN — Medication 1000 MILLIGRAM(S): at 21:28

## 2020-10-18 RX ADMIN — Medication 1 SPRAY(S): at 21:29

## 2020-10-18 RX ADMIN — LOSARTAN POTASSIUM 25 MILLIGRAM(S): 100 TABLET, FILM COATED ORAL at 10:00

## 2020-10-18 RX ADMIN — AMLODIPINE BESYLATE 5 MILLIGRAM(S): 2.5 TABLET ORAL at 10:01

## 2020-10-18 RX ADMIN — OXYCODONE HYDROCHLORIDE 5 MILLIGRAM(S): 5 TABLET ORAL at 07:53

## 2020-10-18 RX ADMIN — OXYCODONE HYDROCHLORIDE 5 MILLIGRAM(S): 5 TABLET ORAL at 13:53

## 2020-10-18 RX ADMIN — Medication 1000 MILLIGRAM(S): at 14:48
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
## 2020-10-19 LAB
ANION GAP SERPL CALC-SCNC: 5 MMOL/L — SIGNIFICANT CHANGE UP (ref 5–17)
BUN SERPL-MCNC: 21 MG/DL — SIGNIFICANT CHANGE UP (ref 7–23)
CALCIUM SERPL-MCNC: 8.8 MG/DL — SIGNIFICANT CHANGE UP (ref 8.5–10.1)
CHLORIDE SERPL-SCNC: 105 MMOL/L — SIGNIFICANT CHANGE UP (ref 96–108)
CO2 SERPL-SCNC: 28 MMOL/L — SIGNIFICANT CHANGE UP (ref 22–31)
CREAT SERPL-MCNC: 0.51 MG/DL — SIGNIFICANT CHANGE UP (ref 0.5–1.3)
GLUCOSE SERPL-MCNC: 100 MG/DL — HIGH (ref 70–99)
HCT VFR BLD CALC: 23.6 % — LOW (ref 34.5–45)
HGB BLD-MCNC: 7.6 G/DL — LOW (ref 11.5–15.5)
MCHC RBC-ENTMCNC: 29.3 PG — SIGNIFICANT CHANGE UP (ref 27–34)
MCHC RBC-ENTMCNC: 32.2 GM/DL — SIGNIFICANT CHANGE UP (ref 32–36)
MCV RBC AUTO: 91.1 FL — SIGNIFICANT CHANGE UP (ref 80–100)
PLATELET # BLD AUTO: 155 K/UL — SIGNIFICANT CHANGE UP (ref 150–400)
POTASSIUM SERPL-MCNC: 4 MMOL/L — SIGNIFICANT CHANGE UP (ref 3.5–5.3)
POTASSIUM SERPL-SCNC: 4 MMOL/L — SIGNIFICANT CHANGE UP (ref 3.5–5.3)
RBC # BLD: 2.59 M/UL — LOW (ref 3.8–5.2)
RBC # FLD: 14.7 % — HIGH (ref 10.3–14.5)
SODIUM SERPL-SCNC: 138 MMOL/L — SIGNIFICANT CHANGE UP (ref 135–145)
WBC # BLD: 6.84 K/UL — SIGNIFICANT CHANGE UP (ref 3.8–10.5)
WBC # FLD AUTO: 6.84 K/UL — SIGNIFICANT CHANGE UP (ref 3.8–10.5)

## 2020-10-19 PROCEDURE — 71045 X-RAY EXAM CHEST 1 VIEW: CPT | Mod: 26

## 2020-10-19 PROCEDURE — 99232 SBSQ HOSP IP/OBS MODERATE 35: CPT

## 2020-10-19 PROCEDURE — 99233 SBSQ HOSP IP/OBS HIGH 50: CPT

## 2020-10-19 PROCEDURE — 99024 POSTOP FOLLOW-UP VISIT: CPT

## 2020-10-19 RX ORDER — PHENYLEPHRINE-SHARK LIVER OIL-MINERAL OIL-PETROLATUM RECTAL OINTMENT
1 OINTMENT (GRAM) RECTAL DAILY
Refills: 0 | Status: DISCONTINUED | OUTPATIENT
Start: 2020-10-19 | End: 2020-10-25

## 2020-10-19 RX ORDER — GLYCERIN ADULT
1 SUPPOSITORY, RECTAL RECTAL DAILY
Refills: 0 | Status: DISCONTINUED | OUTPATIENT
Start: 2020-10-19 | End: 2020-10-19

## 2020-10-19 RX ADMIN — Medication 1000 MILLIGRAM(S): at 21:44

## 2020-10-19 RX ADMIN — OXYCODONE HYDROCHLORIDE 5 MILLIGRAM(S): 5 TABLET ORAL at 17:04

## 2020-10-19 RX ADMIN — LIDOCAINE 1 PATCH: 4 CREAM TOPICAL at 10:08

## 2020-10-19 RX ADMIN — AMLODIPINE BESYLATE 5 MILLIGRAM(S): 2.5 TABLET ORAL at 10:15

## 2020-10-19 RX ADMIN — HEPARIN SODIUM 5000 UNIT(S): 5000 INJECTION INTRAVENOUS; SUBCUTANEOUS at 05:40

## 2020-10-19 RX ADMIN — LOSARTAN POTASSIUM 25 MILLIGRAM(S): 100 TABLET, FILM COATED ORAL at 10:16

## 2020-10-19 RX ADMIN — HEPARIN SODIUM 5000 UNIT(S): 5000 INJECTION INTRAVENOUS; SUBCUTANEOUS at 15:07

## 2020-10-19 RX ADMIN — PANTOPRAZOLE SODIUM 40 MILLIGRAM(S): 20 TABLET, DELAYED RELEASE ORAL at 10:15

## 2020-10-19 RX ADMIN — MORPHINE SULFATE 2 MILLIGRAM(S): 50 CAPSULE, EXTENDED RELEASE ORAL at 15:10

## 2020-10-19 RX ADMIN — OXYCODONE HYDROCHLORIDE 5 MILLIGRAM(S): 5 TABLET ORAL at 10:15

## 2020-10-19 RX ADMIN — OXYCODONE HYDROCHLORIDE 5 MILLIGRAM(S): 5 TABLET ORAL at 17:35

## 2020-10-19 RX ADMIN — Medication 1 SPRAY(S): at 21:44

## 2020-10-19 RX ADMIN — SENNA PLUS 2 TABLET(S): 8.6 TABLET ORAL at 21:43

## 2020-10-19 RX ADMIN — Medication 5 MILLIGRAM(S): at 16:30

## 2020-10-19 RX ADMIN — Medication 1 SPRAY(S): at 10:16

## 2020-10-19 RX ADMIN — Medication 1000 MILLIGRAM(S): at 15:06

## 2020-10-19 RX ADMIN — MORPHINE SULFATE 2 MILLIGRAM(S): 50 CAPSULE, EXTENDED RELEASE ORAL at 15:25

## 2020-10-19 RX ADMIN — Medication 1000 MILLIGRAM(S): at 05:40

## 2020-10-19 RX ADMIN — HEPARIN SODIUM 5000 UNIT(S): 5000 INJECTION INTRAVENOUS; SUBCUTANEOUS at 21:43

## 2020-10-19 RX ADMIN — ATENOLOL 25 MILLIGRAM(S): 25 TABLET ORAL at 10:15

## 2020-10-19 RX ADMIN — OXYCODONE HYDROCHLORIDE 5 MILLIGRAM(S): 5 TABLET ORAL at 10:45

## 2020-10-19 RX ADMIN — HEPARIN SODIUM 5000 UNIT(S): 5000 INJECTION INTRAVENOUS; SUBCUTANEOUS at 05:41
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
## 2020-10-19 NOTE — PHYSICAL THERAPY INITIAL EVALUATION ADULT - PHYSICAL ASSIST/NONPHYSICAL ASSIST: SUPINE/SIT, REHAB EVAL
1 person assist/verbal cues/cues to exhale during transitions lexi. when anticipating pain/nonverbal cues (demo/gestures)

## 2020-10-19 NOTE — PHYSICAL THERAPY INITIAL EVALUATION ADULT - PLANNED THERAPY INTERVENTIONS, PT EVAL
ROM/strengthening/increase mobility as tolerated. Eval.
balance training/bed mobility training/ROM/transfer training/postural re-education/deep breathing/coughing while splinting sternal /ribs ; reinforce IS; pain relieving modalities

## 2020-10-19 NOTE — PHYSICAL THERAPY INITIAL EVALUATION ADULT - DISCHARGE DISPOSITION, PT EVAL
Unable to fully establish
pt states she was receiving home PT prior to admission with h/o chronic back pain
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
## 2020-10-19 NOTE — PHYSICAL THERAPY INITIAL EVALUATION ADULT - PATIENT/FAMILY/SIGNIFICANT OTHER GOALS STATEMENT, PT EVAL
pt is adamantly opposed to rehab placement, prefers to return home ,has treadmill there she uses on a regular basis

## 2020-10-19 NOTE — PHYSICAL THERAPY INITIAL EVALUATION ADULT - CRITERIA FOR SKILLED THERAPEUTIC INTERVENTIONS
anticipated equipment needs at discharge/anticipated discharge recommendation/functional limitations in following categories/risk reduction/prevention/rehab potential/predicted duration of therapy intervention/impairments found/therapy frequency

## 2020-10-19 NOTE — PHYSICAL THERAPY INITIAL EVALUATION ADULT - PRECAUTIONS/LIMITATIONS, REHAB EVAL
Sternal precautions, rib fx's
surgical precautions/KAYA drain discontinued by trauma service earlier ,midline sternal incision
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
## 2020-10-19 NOTE — PHYSICAL THERAPY INITIAL EVALUATION ADULT - GAIT PATTERN USED, PT EVAL
2-point gait/moves slowly,deliberately ,reluctant to ambulate in hallway ,but with encouragement concedes

## 2020-10-19 NOTE — PHYSICAL THERAPY INITIAL EVALUATION ADULT - LIVES WITH, PROFILE
with family
resides in large home in [place] alone ,12 steps up to 2nd floor ,12 steps to basement laundry area/alone

## 2020-10-19 NOTE — PHYSICAL THERAPY INITIAL EVALUATION ADULT - GENERAL OBSERVATIONS, REHAB EVAL
Pt found supine in bed with IV, primafit, and bed alarm activated. Pt c/o pain right side of chest 8/10.
frail cachectic female supine recumbent in bed, midline sternal surgical incision with mild bruising ;medium bruise/contusion to medial R knee awake, alert ,mildly anxious ,talks a lot about numerous somatic complaints some acute like sternal and rib pains ,some more chronic like back and L hip pain ,uses Iphone to refernce photos to further qualify somatic
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
## 2020-10-19 NOTE — PHYSICAL THERAPY INITIAL EVALUATION ADULT - MANUAL MUSCLE TESTING RESULTS, REHAB EVAL
Bilateral shoulder motions not fully assessed at least 3/5 strength no resistance applied. All other gross movments bilateral UE's 4/5. Bilateral LE strength 4/5 throughout grossly.
BUES/BLEs >4/5 range +/- ; shoulders resisted testing deferred due to acute post-op sternal repair

## 2020-10-19 NOTE — PHYSICAL THERAPY INITIAL EVALUATION ADULT - IMPAIRMENTS FOUND, PT EVAL
muscle strength/Limited mobility secondary to pain/ROM
posture/muscle strength/aerobic capacity/endurance

## 2020-10-19 NOTE — PHYSICAL THERAPY INITIAL EVALUATION ADULT - MARITAL STATUS
2nd  of 28 years ,Dr Skyler Rodriguez , last year after several falls /dementia; pt was primary caretaker/

## 2020-10-19 NOTE — PHYSICAL THERAPY INITIAL EVALUATION ADULT - FOLLOWS COMMANDS/ANSWERS QUESTIONS, REHAB EVAL
often needs cues to increase tilmliness of follow thru ,is easily  distracted and off on a tangent/100% of the time

## 2020-10-20 LAB
ANION GAP SERPL CALC-SCNC: 6 MMOL/L — SIGNIFICANT CHANGE UP (ref 5–17)
BUN SERPL-MCNC: 29 MG/DL — HIGH (ref 7–23)
CALCIUM SERPL-MCNC: 9 MG/DL — SIGNIFICANT CHANGE UP (ref 8.5–10.1)
CHLORIDE SERPL-SCNC: 104 MMOL/L — SIGNIFICANT CHANGE UP (ref 96–108)
CO2 SERPL-SCNC: 28 MMOL/L — SIGNIFICANT CHANGE UP (ref 22–31)
CREAT SERPL-MCNC: 0.69 MG/DL — SIGNIFICANT CHANGE UP (ref 0.5–1.3)
GLUCOSE SERPL-MCNC: 96 MG/DL — SIGNIFICANT CHANGE UP (ref 70–99)
HCT VFR BLD CALC: 24.7 % — LOW (ref 34.5–45)
HGB BLD-MCNC: 8 G/DL — LOW (ref 11.5–15.5)
MCHC RBC-ENTMCNC: 29.7 PG — SIGNIFICANT CHANGE UP (ref 27–34)
MCHC RBC-ENTMCNC: 32.4 GM/DL — SIGNIFICANT CHANGE UP (ref 32–36)
MCV RBC AUTO: 91.8 FL — SIGNIFICANT CHANGE UP (ref 80–100)
PLATELET # BLD AUTO: 220 K/UL — SIGNIFICANT CHANGE UP (ref 150–400)
POTASSIUM SERPL-MCNC: 4 MMOL/L — SIGNIFICANT CHANGE UP (ref 3.5–5.3)
POTASSIUM SERPL-SCNC: 4 MMOL/L — SIGNIFICANT CHANGE UP (ref 3.5–5.3)
RBC # BLD: 2.69 M/UL — LOW (ref 3.8–5.2)
RBC # FLD: 14.6 % — HIGH (ref 10.3–14.5)
SODIUM SERPL-SCNC: 138 MMOL/L — SIGNIFICANT CHANGE UP (ref 135–145)
WBC # BLD: 6.68 K/UL — SIGNIFICANT CHANGE UP (ref 3.8–10.5)
WBC # FLD AUTO: 6.68 K/UL — SIGNIFICANT CHANGE UP (ref 3.8–10.5)

## 2020-10-20 PROCEDURE — 99024 POSTOP FOLLOW-UP VISIT: CPT

## 2020-10-20 PROCEDURE — 99233 SBSQ HOSP IP/OBS HIGH 50: CPT

## 2020-10-20 PROCEDURE — 99231 SBSQ HOSP IP/OBS SF/LOW 25: CPT

## 2020-10-20 RX ADMIN — HEPARIN SODIUM 5000 UNIT(S): 5000 INJECTION INTRAVENOUS; SUBCUTANEOUS at 13:29

## 2020-10-20 RX ADMIN — LOSARTAN POTASSIUM 25 MILLIGRAM(S): 100 TABLET, FILM COATED ORAL at 10:46

## 2020-10-20 RX ADMIN — OXYCODONE HYDROCHLORIDE 5 MILLIGRAM(S): 5 TABLET ORAL at 21:43

## 2020-10-20 RX ADMIN — PANTOPRAZOLE SODIUM 40 MILLIGRAM(S): 20 TABLET, DELAYED RELEASE ORAL at 10:46

## 2020-10-20 RX ADMIN — Medication 1000 MILLIGRAM(S): at 13:29

## 2020-10-20 RX ADMIN — ATENOLOL 25 MILLIGRAM(S): 25 TABLET ORAL at 10:46

## 2020-10-20 RX ADMIN — Medication 1000 MILLIGRAM(S): at 21:13

## 2020-10-20 RX ADMIN — LIDOCAINE 1 PATCH: 4 CREAM TOPICAL at 21:13

## 2020-10-20 RX ADMIN — HEPARIN SODIUM 5000 UNIT(S): 5000 INJECTION INTRAVENOUS; SUBCUTANEOUS at 06:22

## 2020-10-20 RX ADMIN — Medication 1 SPRAY(S): at 21:14

## 2020-10-20 RX ADMIN — SENNA PLUS 2 TABLET(S): 8.6 TABLET ORAL at 21:13

## 2020-10-20 RX ADMIN — Medication 1 SPRAY(S): at 10:46

## 2020-10-20 RX ADMIN — HEPARIN SODIUM 5000 UNIT(S): 5000 INJECTION INTRAVENOUS; SUBCUTANEOUS at 21:13

## 2020-10-20 RX ADMIN — Medication 1000 MILLIGRAM(S): at 06:22

## 2020-10-20 RX ADMIN — OXYCODONE HYDROCHLORIDE 5 MILLIGRAM(S): 5 TABLET ORAL at 21:13

## 2020-10-20 RX ADMIN — AMLODIPINE BESYLATE 5 MILLIGRAM(S): 2.5 TABLET ORAL at 10:46
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
## 2020-10-20 NOTE — PROGRESS NOTE ADULT - ASSESSMENT
A/P  Displaced sternal fracture, s/p ORIF 10/16/20  Right 3-4th rib fractures  Left 4th rib fracture  Iatrogenic right apical pneumothorax  Constipation, pt on laxatives  Thoracic surgery management of thoracic pathology  GI/DVT prophylaxis  Pain control  F/U labs, radiologic studies  Incentive spirometry  Ambulation  Cont current care and meds
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
## 2020-10-21 ENCOUNTER — TRANSCRIPTION ENCOUNTER (OUTPATIENT)
Age: 85
End: 2020-10-21

## 2020-10-21 PROCEDURE — 99024 POSTOP FOLLOW-UP VISIT: CPT

## 2020-10-21 PROCEDURE — 99233 SBSQ HOSP IP/OBS HIGH 50: CPT

## 2020-10-21 RX ORDER — FUROSEMIDE 40 MG
40 TABLET ORAL DAILY
Refills: 0 | Status: COMPLETED | OUTPATIENT
Start: 2020-10-21 | End: 2020-10-23

## 2020-10-21 RX ORDER — GLYCERIN ADULT
1 SUPPOSITORY, RECTAL RECTAL ONCE
Refills: 0 | Status: COMPLETED | OUTPATIENT
Start: 2020-10-21 | End: 2020-10-21

## 2020-10-21 RX ORDER — MULTIVIT WITH MIN/MFOLATE/K2 340-15/3 G
1 POWDER (GRAM) ORAL ONCE
Refills: 0 | Status: COMPLETED | OUTPATIENT
Start: 2020-10-21 | End: 2020-10-21

## 2020-10-21 RX ADMIN — ONDANSETRON 4 MILLIGRAM(S): 8 TABLET, FILM COATED ORAL at 15:09

## 2020-10-21 RX ADMIN — HEPARIN SODIUM 5000 UNIT(S): 5000 INJECTION INTRAVENOUS; SUBCUTANEOUS at 21:42

## 2020-10-21 RX ADMIN — Medication 5 MILLIGRAM(S): at 15:09

## 2020-10-21 RX ADMIN — Medication 1000 MILLIGRAM(S): at 05:53

## 2020-10-21 RX ADMIN — LOSARTAN POTASSIUM 25 MILLIGRAM(S): 100 TABLET, FILM COATED ORAL at 09:00

## 2020-10-21 RX ADMIN — AMLODIPINE BESYLATE 5 MILLIGRAM(S): 2.5 TABLET ORAL at 09:00

## 2020-10-21 RX ADMIN — HEPARIN SODIUM 5000 UNIT(S): 5000 INJECTION INTRAVENOUS; SUBCUTANEOUS at 05:53

## 2020-10-21 RX ADMIN — POLYETHYLENE GLYCOL 3350 17 GRAM(S): 17 POWDER, FOR SOLUTION ORAL at 15:09

## 2020-10-21 RX ADMIN — ONDANSETRON 4 MILLIGRAM(S): 8 TABLET, FILM COATED ORAL at 21:59

## 2020-10-21 RX ADMIN — OXYCODONE HYDROCHLORIDE 5 MILLIGRAM(S): 5 TABLET ORAL at 15:09

## 2020-10-21 RX ADMIN — HEPARIN SODIUM 5000 UNIT(S): 5000 INJECTION INTRAVENOUS; SUBCUTANEOUS at 15:09

## 2020-10-21 RX ADMIN — Medication 1 SPRAY(S): at 21:40

## 2020-10-21 RX ADMIN — PHENYLEPHRINE-SHARK LIVER OIL-MINERAL OIL-PETROLATUM RECTAL OINTMENT 1 SUPPOSITORY(S): at 16:16

## 2020-10-21 RX ADMIN — SENNA PLUS 2 TABLET(S): 8.6 TABLET ORAL at 21:42

## 2020-10-21 RX ADMIN — LIDOCAINE 1 PATCH: 4 CREAM TOPICAL at 21:41

## 2020-10-21 RX ADMIN — OXYCODONE HYDROCHLORIDE 5 MILLIGRAM(S): 5 TABLET ORAL at 15:39

## 2020-10-21 RX ADMIN — LIDOCAINE 1 PATCH: 4 CREAM TOPICAL at 06:31

## 2020-10-21 RX ADMIN — ATENOLOL 25 MILLIGRAM(S): 25 TABLET ORAL at 09:00

## 2020-10-21 RX ADMIN — PANTOPRAZOLE SODIUM 40 MILLIGRAM(S): 20 TABLET, DELAYED RELEASE ORAL at 09:01

## 2020-10-21 RX ADMIN — Medication 40 MILLIGRAM(S): at 15:09

## 2020-10-21 RX ADMIN — Medication 1000 MILLIGRAM(S): at 15:30

## 2020-10-21 RX ADMIN — OXYCODONE HYDROCHLORIDE 5 MILLIGRAM(S): 5 TABLET ORAL at 08:30

## 2020-10-21 RX ADMIN — OXYCODONE HYDROCHLORIDE 5 MILLIGRAM(S): 5 TABLET ORAL at 08:00

## 2020-10-21 RX ADMIN — Medication 1 SPRAY(S): at 09:01

## 2020-10-21 RX ADMIN — LIDOCAINE 1 PATCH: 4 CREAM TOPICAL at 09:01
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
## 2020-10-21 NOTE — DISCHARGE NOTE NURSING/CASE MANAGEMENT/SOCIAL WORK - PATIENT PORTAL LINK FT
You can access the FollowMyHealth Patient Portal offered by [facility] by registering at the following website: [email or web address]. By joining [facility]’s FollowMyHealth portal, you will also be able to view your health information using other applications (apps) compatible with our system.

## 2020-10-22 PROCEDURE — 99024 POSTOP FOLLOW-UP VISIT: CPT

## 2020-10-22 PROCEDURE — 99231 SBSQ HOSP IP/OBS SF/LOW 25: CPT

## 2020-10-22 RX ORDER — GLYCERIN ADULT
1 SUPPOSITORY, RECTAL RECTAL ONCE
Refills: 0 | Status: DISCONTINUED | OUTPATIENT
Start: 2020-10-22 | End: 2020-10-22

## 2020-10-22 RX ORDER — ONDANSETRON 8 MG/1
4 TABLET, FILM COATED ORAL EVERY 8 HOURS
Refills: 0 | Status: DISCONTINUED | OUTPATIENT
Start: 2020-10-22 | End: 2020-10-25

## 2020-10-22 RX ADMIN — Medication 40 MILLIGRAM(S): at 10:13

## 2020-10-22 RX ADMIN — OXYCODONE HYDROCHLORIDE 5 MILLIGRAM(S): 5 TABLET ORAL at 23:33

## 2020-10-22 RX ADMIN — LIDOCAINE 1 PATCH: 4 CREAM TOPICAL at 21:05

## 2020-10-22 RX ADMIN — AMLODIPINE BESYLATE 5 MILLIGRAM(S): 2.5 TABLET ORAL at 10:13

## 2020-10-22 RX ADMIN — PANTOPRAZOLE SODIUM 40 MILLIGRAM(S): 20 TABLET, DELAYED RELEASE ORAL at 10:13

## 2020-10-22 RX ADMIN — OXYCODONE HYDROCHLORIDE 5 MILLIGRAM(S): 5 TABLET ORAL at 10:11

## 2020-10-22 RX ADMIN — LIDOCAINE 1 PATCH: 4 CREAM TOPICAL at 11:13

## 2020-10-22 RX ADMIN — SENNA PLUS 2 TABLET(S): 8.6 TABLET ORAL at 21:05

## 2020-10-22 RX ADMIN — OXYCODONE HYDROCHLORIDE 5 MILLIGRAM(S): 5 TABLET ORAL at 10:30

## 2020-10-22 RX ADMIN — Medication 10 MILLIGRAM(S): at 11:49

## 2020-10-22 RX ADMIN — HEPARIN SODIUM 5000 UNIT(S): 5000 INJECTION INTRAVENOUS; SUBCUTANEOUS at 15:35

## 2020-10-22 RX ADMIN — LOSARTAN POTASSIUM 25 MILLIGRAM(S): 100 TABLET, FILM COATED ORAL at 10:16

## 2020-10-22 RX ADMIN — ATENOLOL 25 MILLIGRAM(S): 25 TABLET ORAL at 10:16

## 2020-10-22 RX ADMIN — ONDANSETRON 4 MILLIGRAM(S): 8 TABLET, FILM COATED ORAL at 22:15

## 2020-10-22 RX ADMIN — ONDANSETRON 4 MILLIGRAM(S): 8 TABLET, FILM COATED ORAL at 21:05

## 2020-10-22 RX ADMIN — Medication 5 MILLIGRAM(S): at 10:11

## 2020-10-22 RX ADMIN — Medication 1 SPRAY(S): at 21:05

## 2020-10-22 RX ADMIN — HEPARIN SODIUM 5000 UNIT(S): 5000 INJECTION INTRAVENOUS; SUBCUTANEOUS at 21:05

## 2020-10-22 RX ADMIN — ONDANSETRON 4 MILLIGRAM(S): 8 TABLET, FILM COATED ORAL at 10:13

## 2020-10-22 RX ADMIN — LIDOCAINE 1 PATCH: 4 CREAM TOPICAL at 07:00

## 2020-10-22 RX ADMIN — HEPARIN SODIUM 5000 UNIT(S): 5000 INJECTION INTRAVENOUS; SUBCUTANEOUS at 05:34
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
## 2020-10-23 ENCOUNTER — TRANSCRIPTION ENCOUNTER (OUTPATIENT)
Age: 85
End: 2020-10-23

## 2020-10-23 DIAGNOSIS — S22.21XD FRACTURE OF MANUBRIUM, SUBSEQUENT ENCOUNTER FOR FRACTURE WITH ROUTINE HEALING: ICD-10-CM

## 2020-10-23 PROCEDURE — 99232 SBSQ HOSP IP/OBS MODERATE 35: CPT

## 2020-10-23 PROCEDURE — 99024 POSTOP FOLLOW-UP VISIT: CPT

## 2020-10-23 PROCEDURE — 71045 X-RAY EXAM CHEST 1 VIEW: CPT | Mod: 26

## 2020-10-23 RX ORDER — GLYCERIN ADULT
1 SUPPOSITORY, RECTAL RECTAL DAILY
Refills: 0 | Status: DISCONTINUED | OUTPATIENT
Start: 2020-10-23 | End: 2020-10-25

## 2020-10-23 RX ORDER — PANTOPRAZOLE SODIUM 20 MG/1
40 TABLET, DELAYED RELEASE ORAL
Refills: 0 | Status: DISCONTINUED | OUTPATIENT
Start: 2020-10-23 | End: 2020-10-25

## 2020-10-23 RX ADMIN — Medication 650 MILLIGRAM(S): at 11:18

## 2020-10-23 RX ADMIN — LIDOCAINE 1 PATCH: 4 CREAM TOPICAL at 09:14

## 2020-10-23 RX ADMIN — HEPARIN SODIUM 5000 UNIT(S): 5000 INJECTION INTRAVENOUS; SUBCUTANEOUS at 21:46

## 2020-10-23 RX ADMIN — Medication 5 MILLIGRAM(S): at 09:34

## 2020-10-23 RX ADMIN — Medication 650 MILLIGRAM(S): at 17:28

## 2020-10-23 RX ADMIN — HEPARIN SODIUM 5000 UNIT(S): 5000 INJECTION INTRAVENOUS; SUBCUTANEOUS at 13:39

## 2020-10-23 RX ADMIN — PHENYLEPHRINE-SHARK LIVER OIL-MINERAL OIL-PETROLATUM RECTAL OINTMENT 1 SUPPOSITORY(S): at 09:36

## 2020-10-23 RX ADMIN — ATENOLOL 25 MILLIGRAM(S): 25 TABLET ORAL at 09:34

## 2020-10-23 RX ADMIN — AMLODIPINE BESYLATE 5 MILLIGRAM(S): 2.5 TABLET ORAL at 09:34

## 2020-10-23 RX ADMIN — HEPARIN SODIUM 5000 UNIT(S): 5000 INJECTION INTRAVENOUS; SUBCUTANEOUS at 05:05

## 2020-10-23 RX ADMIN — Medication 650 MILLIGRAM(S): at 09:40

## 2020-10-23 RX ADMIN — LIDOCAINE 1 PATCH: 4 CREAM TOPICAL at 09:36

## 2020-10-23 RX ADMIN — LIDOCAINE 1 PATCH: 4 CREAM TOPICAL at 21:44

## 2020-10-23 RX ADMIN — ONDANSETRON 4 MILLIGRAM(S): 8 TABLET, FILM COATED ORAL at 13:38

## 2020-10-23 RX ADMIN — OXYCODONE HYDROCHLORIDE 5 MILLIGRAM(S): 5 TABLET ORAL at 00:30

## 2020-10-23 RX ADMIN — LOSARTAN POTASSIUM 25 MILLIGRAM(S): 100 TABLET, FILM COATED ORAL at 09:34

## 2020-10-23 RX ADMIN — Medication 40 MILLIGRAM(S): at 09:34

## 2020-10-23 RX ADMIN — ONDANSETRON 4 MILLIGRAM(S): 8 TABLET, FILM COATED ORAL at 21:47
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
## 2020-10-23 NOTE — DISCHARGE NOTE PROVIDER - CARE PROVIDER_API CALL
Brenton Ramirez  THORACIC SURGERY  [address] [place] [address]  Phone: [phone]  Fax: [phone]  Follow Up Time: 2 weeks

## 2020-10-23 NOTE — DISCHARGE NOTE PROVIDER - NSDCMRMEDTOKEN_GEN_ALL_CORE_FT
atenolol 25 mg oral tablet: 1 tab(s) orally once a day  baclofen 10 mg oral tablet: 1-2 tabs orally as needed for spasms  Calmol-4 rectal suppository: 1 suppository(ies) rectal once a day, As Needed  diazePAM 5 mg oral tablet: 0.5 tab(s) orally 2 times a day, As Needed  Flonase Sensimist 27.5 mcg/inh nasal spray: 1 spray(s) in each nostril 2 times a day  hydrocortisone-pramoxine 2.5%-1% rectal cream: 1 applicatorful rectal 2 times a day, As Needed  irbesartan 150 mg oral tablet: 1 tab(s) orally once a day  ketoconazole 2% topical cream: Apply topically to affected area 2 times a day to toes/toenails  Restasis 0.05% ophthalmic emulsion: 1 drop(s) to each affected eye every 12 hours   acetaminophen 325 mg oral tablet: 2 tab(s) orally every 4 hours, As needed, Temp greater or equal to 38C (100.4F), Mild Pain (1 - 3)  atenolol 25 mg oral tablet: 1 tab(s) orally once a day  baclofen 10 mg oral tablet: 1-2 tabs orally as needed for spasms  Calmol-4 rectal suppository: 1 suppository(ies) rectal once a day, As Needed  diazePAM 5 mg oral tablet: 0.5 tab(s) orally 2 times a day, As Needed  Flonase Sensimist 27.5 mcg/inh nasal spray: 1 spray(s) in each nostril 2 times a day  hydrocortisone-pramoxine 2.5%-1% rectal cream: 1 applicatorful rectal 2 times a day, As Needed  irbesartan 150 mg oral tablet: 1 tab(s) orally once a day  ketoconazole 2% topical cream: Apply topically to affected area 2 times a day to toes/toenails  lidocaine 5% topical film: Apply topically to affected area once a day MDD:1  Restasis 0.05% ophthalmic emulsion: 1 drop(s) to each affected eye every 12 hours

## 2020-10-23 NOTE — DISCHARGE NOTE PROVIDER - NSDCFUADDINST_GEN_ALL_CORE_FT
Sternal precautions:  Do not lift more than 5 to 8 pounds.  Do not push or pull with your arms.  Do not reach behind your back or reach both arms out to the side.  Do not reach both arms overhead.

## 2020-10-24 DIAGNOSIS — S22.22XD FRACTURE OF BODY OF STERNUM, SUBSEQUENT ENCOUNTER FOR FRACTURE WITH ROUTINE HEALING: ICD-10-CM

## 2020-10-24 PROCEDURE — 99232 SBSQ HOSP IP/OBS MODERATE 35: CPT

## 2020-10-24 RX ADMIN — ONDANSETRON 4 MILLIGRAM(S): 8 TABLET, FILM COATED ORAL at 22:16

## 2020-10-24 RX ADMIN — HEPARIN SODIUM 5000 UNIT(S): 5000 INJECTION INTRAVENOUS; SUBCUTANEOUS at 13:43

## 2020-10-24 RX ADMIN — Medication 1 SPRAY(S): at 10:04

## 2020-10-24 RX ADMIN — HEPARIN SODIUM 5000 UNIT(S): 5000 INJECTION INTRAVENOUS; SUBCUTANEOUS at 05:16

## 2020-10-24 RX ADMIN — HEPARIN SODIUM 5000 UNIT(S): 5000 INJECTION INTRAVENOUS; SUBCUTANEOUS at 22:14

## 2020-10-24 RX ADMIN — LIDOCAINE 1 PATCH: 4 CREAM TOPICAL at 21:42

## 2020-10-24 RX ADMIN — ONDANSETRON 4 MILLIGRAM(S): 8 TABLET, FILM COATED ORAL at 11:45

## 2020-10-24 RX ADMIN — PANTOPRAZOLE SODIUM 40 MILLIGRAM(S): 20 TABLET, DELAYED RELEASE ORAL at 05:16

## 2020-10-24 RX ADMIN — LIDOCAINE 1 PATCH: 4 CREAM TOPICAL at 06:18

## 2020-10-24 RX ADMIN — SENNA PLUS 2 TABLET(S): 8.6 TABLET ORAL at 22:14
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
## 2020-10-24 NOTE — PROGRESS NOTE ADULT - RS GEN PE MLT RESP DETAILS PC
airway patent/good air movement/respirations non-labored
airway patent/good air movement/respirations non-labored

## 2020-10-24 NOTE — PROGRESS NOTE ADULT - PROBLEM SELECTOR PROBLEM 1
Closed fracture of manubrium with routine healing, subsequent encounter
Closed fracture of body of sternum with routine healing, subsequent encounter
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

## 2020-10-25 VITALS
TEMPERATURE: 98 F | DIASTOLIC BLOOD PRESSURE: 48 MMHG | RESPIRATION RATE: 17 BRPM | HEART RATE: 71 BPM | SYSTOLIC BLOOD PRESSURE: 127 MMHG | OXYGEN SATURATION: 100 %

## 2020-10-25 PROCEDURE — 99238 HOSP IP/OBS DSCHRG MGMT 30/<: CPT

## 2020-10-25 RX ORDER — LIDOCAINE 4 G/100G
1 CREAM TOPICAL
Qty: 7 | Refills: 0
Start: 2020-10-25 | End: 2020-10-31

## 2020-10-25 RX ORDER — ACETAMINOPHEN 500 MG
2 TABLET ORAL
Qty: 0 | Refills: 0 | DISCHARGE
Start: 2020-10-25

## 2020-10-25 RX ADMIN — ONDANSETRON 4 MILLIGRAM(S): 8 TABLET, FILM COATED ORAL at 10:26

## 2020-10-25 RX ADMIN — PANTOPRAZOLE SODIUM 40 MILLIGRAM(S): 20 TABLET, DELAYED RELEASE ORAL at 09:51

## 2020-10-25 RX ADMIN — Medication 1 SPRAY(S): at 09:51

## 2020-10-25 RX ADMIN — Medication 650 MILLIGRAM(S): at 09:53

## 2020-10-25 RX ADMIN — LOSARTAN POTASSIUM 25 MILLIGRAM(S): 100 TABLET, FILM COATED ORAL at 09:51

## 2020-10-25 RX ADMIN — ATENOLOL 25 MILLIGRAM(S): 25 TABLET ORAL at 09:51

## 2020-10-25 NOTE — PROGRESS NOTE ADULT - SUBJECTIVE AND OBJECTIVE BOX
Son's cardiac concerns addressed.    Vital Signs Last 24 Hrs  T(C): 36.7 (25 Oct 2020 07:38), Max: 36.9 (24 Oct 2020 17:00)  T(F): 98.1 (25 Oct 2020 07:38), Max: 98.5 (24 Oct 2020 17:00)  HR: 71 (25 Oct 2020 07:38) (71 - 77)  BP: 127/48 (25 Oct 2020 07:38) (127/48 - 137/52)  RR: 17 (25 Oct 2020 07:38) (16 - 17)  SpO2: 100% (25 Oct 2020 07:38) (95% - 100%)    Copy of ECHO given.
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
## 2020-10-30 DIAGNOSIS — D62 ACUTE POSTHEMORRHAGIC ANEMIA: ICD-10-CM

## 2020-10-30 DIAGNOSIS — M81.0 AGE-RELATED OSTEOPOROSIS WITHOUT CURRENT PATHOLOGICAL FRACTURE: ICD-10-CM

## 2020-10-30 DIAGNOSIS — I31.3 PERICARDIAL EFFUSION (NONINFLAMMATORY): ICD-10-CM

## 2020-10-30 DIAGNOSIS — K59.00 CONSTIPATION, UNSPECIFIED: ICD-10-CM

## 2020-10-30 DIAGNOSIS — I73.00 RAYNAUD'S SYNDROME WITHOUT GANGRENE: ICD-10-CM

## 2020-10-30 DIAGNOSIS — I34.1 NONRHEUMATIC MITRAL (VALVE) PROLAPSE: ICD-10-CM

## 2020-10-30 DIAGNOSIS — M54.9 DORSALGIA, UNSPECIFIED: ICD-10-CM

## 2020-10-30 DIAGNOSIS — K56.7 ILEUS, UNSPECIFIED: ICD-10-CM
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
## 2020-10-30 DIAGNOSIS — M19.90 UNSPECIFIED OSTEOARTHRITIS, UNSPECIFIED SITE: ICD-10-CM

## 2020-10-30 DIAGNOSIS — S22.43XA MULTIPLE FRACTURES OF RIBS, BILATERAL, INITIAL ENCOUNTER FOR CLOSED FRACTURE: ICD-10-CM

## 2020-10-30 DIAGNOSIS — Q87.19 OTHER CONGENITAL MALFORMATION SYNDROMES PREDOMINANTLY ASSOCIATED WITH SHORT STATURE: ICD-10-CM

## 2020-10-30 DIAGNOSIS — Z88.0 ALLERGY STATUS TO PENICILLIN: ICD-10-CM

## 2020-10-30 DIAGNOSIS — J95.811 POSTPROCEDURAL PNEUMOTHORAX: ICD-10-CM
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
## 2020-10-30 DIAGNOSIS — S20.219A CONTUSION OF UNSPECIFIED FRONT WALL OF THORAX, INITIAL ENCOUNTER: ICD-10-CM

## 2020-10-30 DIAGNOSIS — S22.20XA UNSPECIFIED FRACTURE OF STERNUM, INITIAL ENCOUNTER FOR CLOSED FRACTURE: ICD-10-CM

## 2020-10-30 DIAGNOSIS — I10 ESSENTIAL (PRIMARY) HYPERTENSION: ICD-10-CM

## 2020-10-30 DIAGNOSIS — K21.9 GASTRO-ESOPHAGEAL REFLUX DISEASE WITHOUT ESOPHAGITIS: ICD-10-CM
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
## 2020-10-30 DIAGNOSIS — G89.29 OTHER CHRONIC PAIN: ICD-10-CM

## 2020-10-30 DIAGNOSIS — R13.10 DYSPHAGIA, UNSPECIFIED: ICD-10-CM

## 2020-11-05 ENCOUNTER — APPOINTMENT (OUTPATIENT)
Dept: THORACIC SURGERY | Facility: CLINIC | Age: 85
End: 2020-11-05
Payer: MEDICARE

## 2020-11-05 VITALS
BODY MASS INDEX: 14.64 KG/M2 | OXYGEN SATURATION: 97 % | HEART RATE: 87 BPM | SYSTOLIC BLOOD PRESSURE: 110 MMHG | DIASTOLIC BLOOD PRESSURE: 71 MMHG | WEIGHT: 88 LBS | RESPIRATION RATE: 16 BRPM

## 2020-11-05 DIAGNOSIS — S22.39XA FRACTURE OF ONE RIB, UNSPECIFIED SIDE, INITIAL ENCOUNTER FOR CLOSED FRACTURE: ICD-10-CM

## 2020-11-05 PROCEDURE — 99024 POSTOP FOLLOW-UP VISIT: CPT

## 2020-12-14 ENCOUNTER — APPOINTMENT (OUTPATIENT)
Dept: NEUROLOGY | Facility: CLINIC | Age: 85
End: 2020-12-14
Payer: MEDICARE

## 2020-12-14 VITALS
HEART RATE: 84 BPM | SYSTOLIC BLOOD PRESSURE: 100 MMHG | DIASTOLIC BLOOD PRESSURE: 68 MMHG | HEIGHT: 65 IN | TEMPERATURE: 97.8 F | WEIGHT: 85 LBS | BODY MASS INDEX: 14.16 KG/M2

## 2020-12-14 DIAGNOSIS — F22 DELUSIONAL DISORDERS: ICD-10-CM

## 2020-12-14 DIAGNOSIS — I67.1 CEREBRAL ANEURYSM, NONRUPTURED: ICD-10-CM
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
## 2020-12-14 DIAGNOSIS — R41.89 OTHER SYMPTOMS AND SIGNS INVOLVING COGNITIVE FUNCTIONS AND AWARENESS: ICD-10-CM

## 2020-12-14 PROCEDURE — 99204 OFFICE O/P NEW MOD 45 MIN: CPT

## 2020-12-14 NOTE — PHYSICAL EXAM
[General Appearance - Alert] : alert [General Appearance - In No Acute Distress] : in no acute distress [FreeTextEntry1] : Paranoia presnet [Person] : oriented to person [Place] : oriented to place [Time] : oriented to time [Concentration Intact] : normal concentrating ability [Visual Intact] : visual attention was ~T not ~L decreased [Naming Objects] : no difficulty naming common objects [Repeating Phrases] : no difficulty repeating a phrase [Writing A Sentence] : no difficulty writing a sentence [Fluency] : fluency intact [Comprehension] : comprehension intact [Reading] : reading intact [Past History] : adequate knowledge of personal past history [Cranial Nerves Optic (II)] : visual acuity intact bilaterally,  visual fields full to confrontation, pupils equal round and reactive to light [Cranial Nerves Oculomotor (III)] : extraocular motion intact [Cranial Nerves Trigeminal (V)] : facial sensation intact symmetrically [Cranial Nerves Facial (VII)] : face symmetrical [Cranial Nerves Vestibulocochlear (VIII)] : hearing was intact bilaterally [Cranial Nerves Glossopharyngeal (IX)] : tongue and palate midline [Cranial Nerves Accessory (XI - Cranial And Spinal)] : head turning and shoulder shrug symmetric [Cranial Nerves Hypoglossal (XII)] : there was no tongue deviation with protrusion [Motor Strength] : muscle strength was normal in all four extremities [No Muscle Atrophy] : normal bulk in all four extremities [Sensation Tactile Decrease] : light touch was intact [Balance] : balance was intact [Past-pointing] : there was no past-pointing [Tremor] : no tremor present [2+] : Patella left 2+ [1+] : Ankle jerk left 1+ [Plantar Reflex Right Only] : normal on the right [Plantar Reflex Left Only] : normal on the left [FreeTextEntry4] : Can not assess fully not cooperative for MMSE [Sclera] : the sclera and conjunctiva were normal [PERRL With Normal Accommodation] : pupils were equal in size, round, reactive to light, with normal accommodation [Extraocular Movements] : extraocular movements were intact [Outer Ear] : the ears and nose were normal in appearance [Oropharynx] : the oropharynx was normal [Neck Appearance] : the appearance of the neck was normal [Neck Cervical Mass (___cm)] : no neck mass was observed [Jugular Venous Distention Increased] : there was no jugular-venous distention [Thyroid Diffuse Enlargement] : the thyroid was not enlarged [Thyroid Nodule] : there were no palpable thyroid nodules [Auscultation Breath Sounds / Voice Sounds] : lungs were clear to auscultation bilaterally [Heart Rate And Rhythm] : heart rate was normal and rhythm regular [Heart Sounds] : normal S1 and S2 [Heart Sounds Gallop] : no gallops [Murmurs] : no murmurs [Heart Sounds Pericardial Friction Rub] : no pericardial rub [Full Pulse] : the pedal pulses are present [Edema] : there was no peripheral edema [Bowel Sounds] : normal bowel sounds [Abdomen Soft] : soft [Abdomen Tenderness] : non-tender [Abdomen Mass (___ Cm)] : no abdominal mass palpated [No CVA Tenderness] : no ~M costovertebral angle tenderness [No Spinal Tenderness] : no spinal tenderness [Abnormal Walk] : normal gait [Nail Clubbing] : no clubbing  or cyanosis of the fingernails [Musculoskeletal - Swelling] : no joint swelling seen [Motor Tone] : muscle strength and tone were normal [Skin Color & Pigmentation] : normal skin color and pigmentation [Skin Turgor] : normal skin turgor [] : no rash
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
## 2020-12-14 NOTE — REVIEW OF SYSTEMS
[Recent Weight Loss (___ Lbs)] : recent [unfilled] ~Ulb weight loss [Decr. Concentrating Ability] : decreased concentrating ability [Changed Thought Patterns] : changed thought patterns [Change In Personality] : personality change [Emotional Problems] : emotional problems [Negative] : Heme/Lymph [de-identified] : Paranoid and d elusional [FreeTextEntry5] : C/o pain in rib cage

## 2020-12-15 ENCOUNTER — OUTPATIENT (OUTPATIENT)
Dept: OUTPATIENT SERVICES | Facility: HOSPITAL | Age: 85
LOS: 1 days | Discharge: STILL ADMITTED OR ADMINISTRATIVE | End: 2020-12-15
Payer: MEDICARE

## 2020-12-17 DIAGNOSIS — F33.3 MAJOR DEPRESSIVE DISORDER, RECURRENT, SEVERE WITH PSYCHOTIC SYMPTOMS: ICD-10-CM

## 2021-03-13 ENCOUNTER — EMERGENCY (EMERGENCY)
Facility: HOSPITAL | Age: 86
LOS: 0 days | Discharge: HOME | End: 2021-03-13
Attending: STUDENT IN AN ORGANIZED HEALTH CARE EDUCATION/TRAINING PROGRAM
Payer: MEDICARE

## 2021-03-13 VITALS — DIASTOLIC BLOOD PRESSURE: 66 MMHG | HEART RATE: 76 BPM | SYSTOLIC BLOOD PRESSURE: 129 MMHG

## 2021-03-13 VITALS — WEIGHT: 76.94 LBS | HEIGHT: 60 IN

## 2021-03-13 DIAGNOSIS — Z86.718 PERSONAL HISTORY OF OTHER VENOUS THROMBOSIS AND EMBOLISM: ICD-10-CM

## 2021-03-13 DIAGNOSIS — S90.32XA CONTUSION OF LEFT FOOT, INITIAL ENCOUNTER: ICD-10-CM

## 2021-03-13 DIAGNOSIS — Z88.0 ALLERGY STATUS TO PENICILLIN: ICD-10-CM

## 2021-03-13 DIAGNOSIS — Z20.822 CONTACT WITH AND (SUSPECTED) EXPOSURE TO COVID-19: ICD-10-CM

## 2021-03-13 DIAGNOSIS — R60.0 LOCALIZED EDEMA: ICD-10-CM
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
## 2021-03-13 DIAGNOSIS — S90.31XA CONTUSION OF RIGHT FOOT, INITIAL ENCOUNTER: ICD-10-CM

## 2021-03-13 DIAGNOSIS — Z86.711 PERSONAL HISTORY OF PULMONARY EMBOLISM: ICD-10-CM

## 2021-03-13 DIAGNOSIS — Z88.8 ALLERGY STATUS TO OTHER DRUGS, MEDICAMENTS AND BIOLOGICAL SUBSTANCES: ICD-10-CM

## 2021-03-13 DIAGNOSIS — I10 ESSENTIAL (PRIMARY) HYPERTENSION: ICD-10-CM

## 2021-03-13 LAB
ADD ON TEST-SPECIMEN IN LAB: SIGNIFICANT CHANGE UP
ALBUMIN SERPL ELPH-MCNC: 3.2 G/DL — LOW (ref 3.3–5)
ALP SERPL-CCNC: 85 U/L — SIGNIFICANT CHANGE UP (ref 40–120)
ALT FLD-CCNC: 39 U/L — SIGNIFICANT CHANGE UP (ref 12–78)
ANION GAP SERPL CALC-SCNC: 3 MMOL/L — LOW (ref 5–17)
APTT BLD: 31.9 SEC — SIGNIFICANT CHANGE UP (ref 27.5–35.5)
AST SERPL-CCNC: 42 U/L — HIGH (ref 15–37)
BASOPHILS # BLD AUTO: 0.03 K/UL — SIGNIFICANT CHANGE UP (ref 0–0.2)
BASOPHILS NFR BLD AUTO: 0.8 % — SIGNIFICANT CHANGE UP (ref 0–2)
BILIRUB SERPL-MCNC: 0.5 MG/DL — SIGNIFICANT CHANGE UP (ref 0.2–1.2)
BUN SERPL-MCNC: 26 MG/DL — HIGH (ref 7–23)
CALCIUM SERPL-MCNC: 9.6 MG/DL — SIGNIFICANT CHANGE UP (ref 8.5–10.1)
CHLORIDE SERPL-SCNC: 102 MMOL/L — SIGNIFICANT CHANGE UP (ref 96–108)
CO2 SERPL-SCNC: 33 MMOL/L — HIGH (ref 22–31)
CREAT SERPL-MCNC: 1.03 MG/DL — SIGNIFICANT CHANGE UP (ref 0.5–1.3)
EOSINOPHIL # BLD AUTO: 0.01 K/UL — SIGNIFICANT CHANGE UP (ref 0–0.5)
EOSINOPHIL NFR BLD AUTO: 0.3 % — SIGNIFICANT CHANGE UP (ref 0–6)
GLUCOSE SERPL-MCNC: 87 MG/DL — SIGNIFICANT CHANGE UP (ref 70–99)
HCT VFR BLD CALC: 37.1 % — SIGNIFICANT CHANGE UP (ref 34.5–45)
HGB BLD-MCNC: 11.8 G/DL — SIGNIFICANT CHANGE UP (ref 11.5–15.5)
IMM GRANULOCYTES NFR BLD AUTO: 0.3 % — SIGNIFICANT CHANGE UP (ref 0–1.5)
INR BLD: 0.96 RATIO — SIGNIFICANT CHANGE UP (ref 0.88–1.16)
LYMPHOCYTES # BLD AUTO: 0.73 K/UL — LOW (ref 1–3.3)
LYMPHOCYTES # BLD AUTO: 19.7 % — SIGNIFICANT CHANGE UP (ref 13–44)
MCHC RBC-ENTMCNC: 28.9 PG — SIGNIFICANT CHANGE UP (ref 27–34)
MCHC RBC-ENTMCNC: 31.8 GM/DL — LOW (ref 32–36)
MCV RBC AUTO: 90.9 FL — SIGNIFICANT CHANGE UP (ref 80–100)
MONOCYTES # BLD AUTO: 0.31 K/UL — SIGNIFICANT CHANGE UP (ref 0–0.9)
MONOCYTES NFR BLD AUTO: 8.4 % — SIGNIFICANT CHANGE UP (ref 2–14)
NEUTROPHILS # BLD AUTO: 2.62 K/UL — SIGNIFICANT CHANGE UP (ref 1.8–7.4)
NEUTROPHILS NFR BLD AUTO: 70.5 % — SIGNIFICANT CHANGE UP (ref 43–77)
NT-PROBNP SERPL-SCNC: 352 PG/ML — SIGNIFICANT CHANGE UP (ref 0–450)
PLATELET # BLD AUTO: 184 K/UL — SIGNIFICANT CHANGE UP (ref 150–400)
POTASSIUM SERPL-MCNC: 3.4 MMOL/L — LOW (ref 3.5–5.3)
POTASSIUM SERPL-SCNC: 3.4 MMOL/L — LOW (ref 3.5–5.3)
PROT SERPL-MCNC: 7.4 GM/DL — SIGNIFICANT CHANGE UP (ref 6–8.3)
PROTHROM AB SERPL-ACNC: 11.2 SEC — SIGNIFICANT CHANGE UP (ref 10.6–13.6)
RBC # BLD: 4.08 M/UL — SIGNIFICANT CHANGE UP (ref 3.8–5.2)
RBC # FLD: 16.7 % — HIGH (ref 10.3–14.5)
SARS-COV-2 RNA SPEC QL NAA+PROBE: SIGNIFICANT CHANGE UP
SODIUM SERPL-SCNC: 138 MMOL/L — SIGNIFICANT CHANGE UP (ref 135–145)
TROPONIN I SERPL-MCNC: <0.015 NG/ML — SIGNIFICANT CHANGE UP (ref 0.01–0.04)
WBC # BLD: 3.71 K/UL — LOW (ref 3.8–10.5)
WBC # FLD AUTO: 3.71 K/UL — LOW (ref 3.8–10.5)

## 2021-03-13 PROCEDURE — 83880 ASSAY OF NATRIURETIC PEPTIDE: CPT

## 2021-03-13 PROCEDURE — 93970 EXTREMITY STUDY: CPT | Mod: 26

## 2021-03-13 PROCEDURE — U0003: CPT

## 2021-03-13 PROCEDURE — 93970 EXTREMITY STUDY: CPT

## 2021-03-13 PROCEDURE — 86900 BLOOD TYPING SEROLOGIC ABO: CPT

## 2021-03-13 PROCEDURE — 85610 PROTHROMBIN TIME: CPT

## 2021-03-13 PROCEDURE — 80053 COMPREHEN METABOLIC PANEL: CPT

## 2021-03-13 PROCEDURE — 93005 ELECTROCARDIOGRAM TRACING: CPT

## 2021-03-13 PROCEDURE — 86901 BLOOD TYPING SEROLOGIC RH(D): CPT

## 2021-03-13 PROCEDURE — 71045 X-RAY EXAM CHEST 1 VIEW: CPT

## 2021-03-13 PROCEDURE — 73630 X-RAY EXAM OF FOOT: CPT | Mod: 50

## 2021-03-13 PROCEDURE — 36415 COLL VENOUS BLD VENIPUNCTURE: CPT

## 2021-03-13 PROCEDURE — 84484 ASSAY OF TROPONIN QUANT: CPT | Mod: 59

## 2021-03-13 PROCEDURE — 82550 ASSAY OF CK (CPK): CPT

## 2021-03-13 PROCEDURE — 71045 X-RAY EXAM CHEST 1 VIEW: CPT | Mod: 26

## 2021-03-13 PROCEDURE — 99284 EMERGENCY DEPT VISIT MOD MDM: CPT | Mod: 25

## 2021-03-13 PROCEDURE — 93925 LOWER EXTREMITY STUDY: CPT

## 2021-03-13 PROCEDURE — 93010 ELECTROCARDIOGRAM REPORT: CPT

## 2021-03-13 PROCEDURE — 99285 EMERGENCY DEPT VISIT HI MDM: CPT | Mod: CS

## 2021-03-13 PROCEDURE — U0005: CPT

## 2021-03-13 PROCEDURE — 73630 X-RAY EXAM OF FOOT: CPT | Mod: 26,50

## 2021-03-13 PROCEDURE — 86850 RBC ANTIBODY SCREEN: CPT

## 2021-03-13 PROCEDURE — 85730 THROMBOPLASTIN TIME PARTIAL: CPT

## 2021-03-13 PROCEDURE — 93925 LOWER EXTREMITY STUDY: CPT | Mod: 26

## 2021-03-13 PROCEDURE — 85025 COMPLETE CBC W/AUTO DIFF WBC: CPT

## 2021-03-13 RX ORDER — SODIUM CHLORIDE 9 MG/ML
500 INJECTION INTRAMUSCULAR; INTRAVENOUS; SUBCUTANEOUS ONCE
Refills: 0 | Status: COMPLETED | OUTPATIENT
Start: 2021-03-13 | End: 2021-03-13

## 2021-03-13 RX ORDER — POTASSIUM CHLORIDE 20 MEQ
40 PACKET (EA) ORAL ONCE
Refills: 0 | Status: COMPLETED | OUTPATIENT
Start: 2021-03-13 | End: 2021-03-13
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
## 2021-03-13 NOTE — ED STATDOCS - NSFOLLOWUPINSTRUCTIONS_ED_ALL_ED_FT
PLEASE FOLLOW UP WITH VASCULAR SURGERY AS ADVISED. CONTINUE FOLLOW UPS WITH PCP, DERMATOLOGY, RHEUMATOLOGY. RETURN TO ED IF SYMPTOMS WORSEN. Advised compression socks and leg elevation as much as possible.        Leg Edema    WHAT YOU NEED TO KNOW:    Leg edema is swelling caused by fluid buildup. Your legs may swell if you sit or stand for long periods of time, are pregnant, or are injured. Swelling may also occur if you have heart failure or circulation problems. This means that your heart does not pump blood through your body as it should.    DISCHARGE INSTRUCTIONS:    Call your local emergency number (911 in the [place]) for any of the following:   •You cannot walk.      •You have chest pain or trouble breathing that is worse when you lie down.      •You suddenly feel lightheaded and have trouble breathing.      •You have new and sudden chest pain. You may have more pain when you take deep breaths or cough.      •You cough up blood.      Return to the emergency department if:   •You feel faint or confused.      •Your skin turns blue or gray.      •Your leg feels warm, tender, and painful. It may be swollen and red.      Call your doctor if:   •You have a fever or feel more tired than usual.      •The veins in your legs look larger than usual. They may look full or bulging.      •Your legs itch or feel heavy.      •You have red or white areas or sores on your legs. The skin may also appear dimpled or have indentations.      •You are gaining weight.      •You have trouble moving your ankles.      •The swelling does not go away, or other parts of your body swell.      •You have questions or concerns about your condition or care.      Self-care:   •Elevate your legs. Raise your legs above the level of your heart as often as you can. This will help decrease swelling and pain. Prop your legs on pillows or blankets to keep them elevated comfortably.  Elevate Leg           •Wear pressure stockings, if directed. These tight stockings put pressure on your legs to promote blood flow and prevent blood clots. Put them on before you get out of bed. Wear the stockings during the day. Do not wear them while you sleep.  Pressure Stockings            •Stay active. Do not stand or sit for long periods of time. Ask your healthcare provider about the best exercise plan for you.  Walking for Exercise           •Eat healthy foods. Healthy foods include fruits, vegetables, whole-grain breads, low-fat dairy products, beans, lean meats, and fish. Ask if you need to be on a special diet.  Healthy Foods           •Limit sodium (salt). Salt will make your body hold even more fluid. Your healthcare provider will tell you how many milligrams (mg) of salt you can have each day.             Follow up with your doctor as directed: Write down your questions so you remember to ask them during your visits.       © Copyright [employer] 2021

## 2021-03-13 NOTE — ED ADULT TRIAGE NOTE - CHIEF COMPLAINT QUOTE
Pt denies any acute injury, but reports edema and ecchymosis to foot.  Past hx of DVT and PE.  Denies f/c/c, CP, or SOB.

## 2021-03-13 NOTE — ED STATDOCS - MUSCULOSKELETAL, MLM
range of motion is not limited and there is no muscle tenderness. range of motion is not limited, mottled L foot dorsum unable to palpate pulses, DP and PT pulses noted with doppler range of motion is not limited, discoloration/ecchymosis to dorsum of left foot;  DP and PT pulses noted with doppler

## 2021-03-13 NOTE — ED STATDOCS - PATIENT PORTAL LINK FT
You can access the FollowMyHealth Patient Portal offered by [facility] by registering at the following website: [email or web address]. By joining [email or web address]’s FollowMyHealth portal, you will also be able to view your health information using other applications (apps) compatible with our system.

## 2021-03-13 NOTE — ED ADULT NURSE NOTE - OBJECTIVE STATEMENT
Pt c/o bilateral foot pain and bruising. Pt alert and oriented x4, but forgetful. Pt denies trauma/fall recently. Pt denies chest pain, shortness of breath. Pt with bilateral +1 edema bilateral lower extremity. Cardiac monitoring in progress, EKG done, safety maintained. Pt with +1 pulse dorsalis pedis pulse

## 2021-03-13 NOTE — ED STATDOCS - ATTENDING CONTRIBUTION TO CARE
I, Jyoti Humphries DO,  performed the initial face to face bedside interview with this patient regarding history of present illness, review of symptoms and relevant past medical, social and family history.  I completed an independent physical examination.  I was the initial provider who evaluated this patient. I have signed out the follow up of any pending tests (i.e. labs, radiological studies) to the ACP.  I have communicated the patient’s plan of care and disposition with the ACP.  The history, relevant review of systems, past medical and surgical history, medical decision making, and physical examination was documented by the scribe in my presence and I attest to the accuracy of the documentation.

## 2021-03-13 NOTE — ED STATDOCS - PROVIDER TOKENS
Teaching Flowsheet   Relevant Diagnosis: Cubital tunnel and carpal tunnel syndrome  Teaching Topic: Right cubital tunnel release/possible ulnar nerve transposition and open carpal tunnel release     Person(s) involved in teaching:   Patient     Motivation Level:  Asks Questions: Yes  Eager to Learn: Yes  Cooperative: Yes  Receptive (willing/able to accept information): Yes  Any cultural factors/Ep[employer] beliefs that may influence understanding or compliance? No    Patient demonstrates understanding of the following:  Reason for the appointment, diagnosis and treatment plan: Yes  Knowledge of proper use of medications and conditions for which they are ordered (with special attention to potential side effects or drug interactions): Yes  Which situations necessitate calling provider and whom to contact: Yes     Teaching Concerns Addressed:   Comments: Patient understands she will need pre-op H&P completed.  She will get this done with her PCP at [facility]       Nutritional needs and diet plan: Yes  Pain management techniques: Yes  Wound Care: Yes  How and/when to access community resources: Yes     Instructional Materials Used/Given: Pre-op packet given and reviewed with patient.  Antiseptic soap given. Patient would like a surgery date next week if possible.  Message sent to Iza to call patient today or tomorrow to schedule surgery.      Time spent with patient: 15 minutes.     PROVIDER:[TOKEN:[[id number]]]
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
## 2021-03-13 NOTE — ED ADULT NURSE NOTE - NSIMPLEMENTINTERV_GEN_ALL_ED
Implemented All Fall Risk Interventions:  [place] to call system. Call bell, personal items and telephone within reach. Instruct patient to call for assistance. Room bathroom lighting operational. Non-slip footwear when patient is off stretcher. Physically safe environment: no spills, clutter or unnecessary equipment. Stretcher in lowest position, wheels locked, appropriate side rails in place. Provide visual cue, wrist band, yellow gown, etc. Monitor gait and stability. Monitor for mental status changes and reorient to person, place, and time. Review medications for side effects contributing to fall risk. Reinforce activity limits and safety measures with patient and family.

## 2021-03-13 NOTE — ED STATDOCS - CARE PROVIDER_API CALL
ManvarSingh, Pallavi B (MD)  Vascular Surgery  [address] [place] [address], [address] [place] [address]  Phone: [phone]  Fax: [phone]  Follow Up Time:

## 2021-05-27 ENCOUNTER — EMERGENCY (EMERGENCY)
Facility: HOSPITAL | Age: 86
LOS: 0 days | Discharge: HOME | End: 2021-05-27
Attending: EMERGENCY MEDICINE
Payer: MEDICARE

## 2021-05-27 VITALS
SYSTOLIC BLOOD PRESSURE: 142 MMHG | DIASTOLIC BLOOD PRESSURE: 68 MMHG | OXYGEN SATURATION: 100 % | RESPIRATION RATE: 18 BRPM | HEART RATE: 73 BPM | TEMPERATURE: 98 F

## 2021-05-27 VITALS — HEIGHT: 60 IN | WEIGHT: 110.01 LBS

## 2021-05-27 DIAGNOSIS — Z88.2 ALLERGY STATUS TO SULFONAMIDES: ICD-10-CM

## 2021-05-27 DIAGNOSIS — K64.4 RESIDUAL HEMORRHOIDAL SKIN TAGS: ICD-10-CM

## 2021-05-27 DIAGNOSIS — K64.8 OTHER HEMORRHOIDS: ICD-10-CM

## 2021-05-27 DIAGNOSIS — R19.5 OTHER FECAL ABNORMALITIES: ICD-10-CM

## 2021-05-27 DIAGNOSIS — Z88.8 ALLERGY STATUS TO OTHER DRUGS, MEDICAMENTS AND BIOLOGICAL SUBSTANCES STATUS: ICD-10-CM

## 2021-05-27 DIAGNOSIS — R60.0 LOCALIZED EDEMA: ICD-10-CM

## 2021-05-27 DIAGNOSIS — Z88.0 ALLERGY STATUS TO PENICILLIN: ICD-10-CM

## 2021-05-27 DIAGNOSIS — K59.00 CONSTIPATION, UNSPECIFIED: ICD-10-CM

## 2021-05-27 DIAGNOSIS — I10 ESSENTIAL (PRIMARY) HYPERTENSION: ICD-10-CM

## 2021-05-27 LAB
ANION GAP SERPL CALC-SCNC: 3 MMOL/L — LOW (ref 5–17)
BASOPHILS # BLD AUTO: 0.03 K/UL — SIGNIFICANT CHANGE UP (ref 0–0.2)
BASOPHILS NFR BLD AUTO: 0.4 % — SIGNIFICANT CHANGE UP (ref 0–2)
BUN SERPL-MCNC: 34 MG/DL — HIGH (ref 7–23)
CALCIUM SERPL-MCNC: 9.9 MG/DL — SIGNIFICANT CHANGE UP (ref 8.5–10.1)
CHLORIDE SERPL-SCNC: 104 MMOL/L — SIGNIFICANT CHANGE UP (ref 96–108)
CO2 SERPL-SCNC: 32 MMOL/L — HIGH (ref 22–31)
CREAT SERPL-MCNC: 0.84 MG/DL — SIGNIFICANT CHANGE UP (ref 0.5–1.3)
EOSINOPHIL # BLD AUTO: 0.02 K/UL — SIGNIFICANT CHANGE UP (ref 0–0.5)
EOSINOPHIL NFR BLD AUTO: 0.3 % — SIGNIFICANT CHANGE UP (ref 0–6)
GLUCOSE SERPL-MCNC: 94 MG/DL — SIGNIFICANT CHANGE UP (ref 70–99)
HCT VFR BLD CALC: 34.3 % — LOW (ref 34.5–45)
HGB BLD-MCNC: 11 G/DL — LOW (ref 11.5–15.5)
IMM GRANULOCYTES NFR BLD AUTO: 0.6 % — SIGNIFICANT CHANGE UP (ref 0–1.5)
LYMPHOCYTES # BLD AUTO: 0.7 K/UL — LOW (ref 1–3.3)
LYMPHOCYTES # BLD AUTO: 10.4 % — LOW (ref 13–44)
MCHC RBC-ENTMCNC: 30.6 PG — SIGNIFICANT CHANGE UP (ref 27–34)
MCHC RBC-ENTMCNC: 32.1 GM/DL — SIGNIFICANT CHANGE UP (ref 32–36)
MCV RBC AUTO: 95.3 FL — SIGNIFICANT CHANGE UP (ref 80–100)
MONOCYTES # BLD AUTO: 0.4 K/UL — SIGNIFICANT CHANGE UP (ref 0–0.9)
MONOCYTES NFR BLD AUTO: 6 % — SIGNIFICANT CHANGE UP (ref 2–14)
NEUTROPHILS # BLD AUTO: 5.53 K/UL — SIGNIFICANT CHANGE UP (ref 1.8–7.4)
NEUTROPHILS NFR BLD AUTO: 82.3 % — HIGH (ref 43–77)
NT-PROBNP SERPL-SCNC: 296 PG/ML — SIGNIFICANT CHANGE UP (ref 0–450)
PLATELET # BLD AUTO: 234 K/UL — SIGNIFICANT CHANGE UP (ref 150–400)
POTASSIUM SERPL-MCNC: 4 MMOL/L — SIGNIFICANT CHANGE UP (ref 3.5–5.3)
POTASSIUM SERPL-SCNC: 4 MMOL/L — SIGNIFICANT CHANGE UP (ref 3.5–5.3)
RBC # BLD: 3.6 M/UL — LOW (ref 3.8–5.2)
RBC # FLD: 16.1 % — HIGH (ref 10.3–14.5)
SODIUM SERPL-SCNC: 139 MMOL/L — SIGNIFICANT CHANGE UP (ref 135–145)
TROPONIN I SERPL-MCNC: <0.015 NG/ML — SIGNIFICANT CHANGE UP (ref 0.01–0.04)
WBC # BLD: 6.72 K/UL — SIGNIFICANT CHANGE UP (ref 3.8–10.5)
WBC # FLD AUTO: 6.72 K/UL — SIGNIFICANT CHANGE UP (ref 3.8–10.5)

## 2021-05-27 PROCEDURE — 36415 COLL VENOUS BLD VENIPUNCTURE: CPT

## 2021-05-27 PROCEDURE — 99285 EMERGENCY DEPT VISIT HI MDM: CPT

## 2021-05-27 PROCEDURE — 93010 ELECTROCARDIOGRAM REPORT: CPT

## 2021-05-27 PROCEDURE — 74018 RADEX ABDOMEN 1 VIEW: CPT | Mod: 26

## 2021-05-27 PROCEDURE — 74018 RADEX ABDOMEN 1 VIEW: CPT

## 2021-05-27 PROCEDURE — 83880 ASSAY OF NATRIURETIC PEPTIDE: CPT

## 2021-05-27 PROCEDURE — 93005 ELECTROCARDIOGRAM TRACING: CPT

## 2021-05-27 PROCEDURE — 80048 BASIC METABOLIC PNL TOTAL CA: CPT

## 2021-05-27 PROCEDURE — 85025 COMPLETE CBC W/AUTO DIFF WBC: CPT

## 2021-05-27 PROCEDURE — 99283 EMERGENCY DEPT VISIT LOW MDM: CPT

## 2021-05-27 PROCEDURE — 84484 ASSAY OF TROPONIN QUANT: CPT

## 2021-05-27 RX ADMIN — Medication 1 ENEMA: at 16:22

## 2021-05-27 NOTE — ED STATDOCS - PROGRESS NOTE DETAILS
86 yr. old female presents to ED with constipation for 2 weeks. Had Mg. Citrate, Dulcolax and Senna with no relief. Seen and examined by attending in intake. Plan: Fleets enema, disimpaction and re evaluate. Soren DALTON Tho POWERS Rectal Exam: Lot #[id number] Exp. date 9/30 /21 QC checked. + blood stool quaiac. +stool in rectum disimpacted large amount of brown formed stool. Patient has internal and external hemorrhoids as per PMH reported by chelle. Soren DALTON Tho James RN Rectal Exam: Lot #[id number] Exp. date 9/30 /21 QC checked. + blood stool quaiac. +stool in rectum disimpacted large amount of brown formed stool. Patient has internal and external hemorrhoids as per PMH reported by chelle. Soren NP maye: pt with mild bm in er. tolerating po. labs don't indicate chf. abd xr showing non obstructive pattern & pt passing gas. aide at bedside & son over phone concerned that pt should "empty bowels" in ER but informed to continue mirilax & f/u with GI maye: on reassessment aide notes increased b/l LE non pitting edema despite using lasix.  Ambulates with walker. will get labs/ekg to eval chf but low suspicion. likely funcitional edema. no cp/no calf pain. no

## 2021-05-27 NOTE — ED STATDOCS - CLINICAL SUMMARY MEDICAL DECISION MAKING FREE TEXT BOX
Constipation. Pt feels like she is impacted. Plan: rectal exam, enema, possible disimpaction. initial plan: Constipation. Pt feels like she is impacted. Plan: rectal exam, enema, possible disimpaction.

## 2021-05-27 NOTE — ED STATDOCS - OBJECTIVE STATEMENT
Pertinent HPI/PMH/PSH/FHx/SHx and Review of Systems contained within  HPI:  85 y/o female p/w constipation x2 weeks. Per aide at bedside, pt was given mag citrate, Doculax and Senna with minimal relief. Ambulates with walker.   PMH/PSH relevant for: chronic LE edema, fecal impaction, HTN, hemorrhoids  ROS negative for: fever, Chest pain, SOB, Nausea, vomiting, diarrhea, abdominal pain, dysuria    FamilyHx and SocialHx not otherwise contributory Pertinent HPI/PMH/PSH/FHx/SHx and Review of Systems contained within  HPI:  87 y/o female p/w constipation x2 weeks. Per aide at bedside, pt was given mag citrate, Doculax and Senna with minimal relief. feels like prior fecal impaction.   PMH/PSH relevant for: chronic LE edema,, HTN, hemorrhoids  ROS negative for: fever, Chest pain, SOB, Nausea, vomiting, diarrhea, abdominal pain, dysuria    FamilyHx and SocialHx not otherwise contributory

## 2021-05-27 NOTE — ED ADULT NURSE NOTE - OBJECTIVE STATEMENT
Pt comes to ED with c/o constipation x 2 weeks. Pt was given colace, senna, and mag citrate without relief.

## 2021-05-27 NOTE — ED ADULT NURSE REASSESSMENT NOTE - NS ED NURSE REASSESS COMMENT FT1
Pt was manually disimpacted by NP with large amount of brown stool removed. Pt refused anymore disimpaction. ED MD recommended second disimpaction with numbing cream, pt adamantly refused.

## 2021-05-27 NOTE — ED STATDOCS - NS ED ROS FT
Review of Systems:  	•	CONSTITUTIONAL: no fever  	•	SKIN: no rash  	•	RESPIRATORY: no shortness of breath  	•	CARDIAC: no chest pain  	•	GI:  no abd pain, no nausea, no vomiting, no diarrhea. +abd bloating, +constipation  	•	GENITO-URINARY:  no dysuria  	•	MUSCULOSKELETAL:  no back pain  	•	NEUROLOGIC: no weakness  	•	ALLERGY: no rhinorrhea  	•	PSYSCHIATRIC: appropriate concern about symptoms

## 2021-05-27 NOTE — ED PROCEDURE NOTE - PROCEDURE ADDITIONAL DETAILS
used gloved fingers with lubrication to disimpact patient. stool in rectum disimpacted large amount of brown formed stool. Patient has internal and external hemorrhoids as per PMH reported by aide.

## 2021-05-27 NOTE — ED ADULT NURSE NOTE - NSIMPLEMENTINTERV_GEN_ALL_ED
Implemented All Fall with Harm Risk Interventions:  [place] to call system. Call bell, personal items and telephone within reach. Instruct patient to call for assistance. Room bathroom lighting operational. Non-slip footwear when patient is off stretcher. Physically safe environment: no spills, clutter or unnecessary equipment. Stretcher in lowest position, wheels locked, appropriate side rails in place. Provide visual cue, wrist band, yellow gown, etc. Monitor gait and stability. Monitor for mental status changes and reorient to person, place, and time. Review medications for side effects contributing to fall risk. Reinforce activity limits and safety measures with patient and family. Provide visual clues: red socks.

## 2021-05-27 NOTE — ED STATDOCS - CARE PROVIDER_API CALL
Carlo Fortune)  Gastroenterology; Internal Medicine  [address], [address]  [place] [address]  Phone: [phone]  Fax: [phone]  Follow Up Time:

## 2021-05-27 NOTE — ED ADULT TRIAGE NOTE - CHIEF COMPLAINT QUOTE
pt presents to ED due to complaints of constipation x 2 weeks pt has hx of impaction as per her aide pt was given mag citrate, ducolax and senna with minimal relief

## 2021-05-27 NOTE — ED STATDOCS - CARE PLAN
Principal Discharge DX:	Constipation   Principal Discharge DX:	Fecal impaction  Secondary Diagnosis:	Constipation  Secondary Diagnosis:	Lower extremity edema

## 2021-05-27 NOTE — ED STATDOCS - PHYSICAL EXAMINATION
*GEN: No acute distress, well appearing   *HEAD: Normocephalic, Atraumatic  *EYES/NOSE: b/l Pupils symmetric & Reactive to light, EOMI b/l.  *THROAT: airway patent, moist mucous membranes  *NECK: Neck supple  *PULMONARY: No Respiratory distress, symmetric b/l chest rise  *CARDIAC: s1s2, regular rhythm   *ABDOMEN:  Non Tender, Non Distended, soft, no guarding, no rebound, no masses   *BACK: no CVA tenderness, No midline vertebral tenderness to palpation   *EXTREMITIES: symmetric pulses, 2+ DP & radial pulses, no cyanosis. +b/l LE nonpitting edema.   *SKIN: no rash, no bruising   *NEUROLOGIC: alert,  full active & passive ROM in all 4 extremities,   *PSYCH: appropriate concern about symptoms, pleasant *GEN: No acute distress, well appearing   *HEAD: Normocephalic, Atraumatic  *EYES/NOSE: b/l Pupils symmetric & Reactive to light, EOMI b/l.  *THROAT: airway patent, moist mucous membranes  *NECK: Neck supple  *PULMONARY: No Respiratory distress, symmetric b/l chest rise  *CARDIAC: s1s2, regular rhythm   *ABDOMEN:  Non Tender, Non Distended, soft, no guarding, no rebound, no masses   *BACK: no CVA tenderness, No midline vertebral tenderness to palpation   *EXTREMITIES: symmetric pulses, 2+ DP & radial pulses, no cyanosis. +b/l symmetric LE nonpitting edema with chronic skin changes of LE  *SKIN: no rash, chronic skin changes of LE  *NEUROLOGIC: alert,  full active & passive ROM in all 4 extremities, able to ambulate with assitance  *PSYCH: appropriate concern about symptoms, pleasant

## 2021-05-27 NOTE — ED STATDOCS - NSFOLLOWUPINSTRUCTIONS_ED_ALL_ED_FT
CONSTIPATION - AfterCare(R) Instructions(ER/ED)           Constipation    WHAT YOU NEED TO KNOW:    Constipation means you have hard, dry bowel movements, or you go longer than usual between bowel movements.    DISCHARGE INSTRUCTIONS:    Call your doctor if:   •You have blood in your bowel movements.      •You have a fever and abdominal pain with the constipation.      •Your constipation gets worse.      •You start to vomit.      •You have questions or concerns about your condition or care.      Medicines:   •Medicine such as a laxative may help relax and loosen your intestines to help you have a bowel movement. Your provider may recommend you only use laxatives for a short time. Long-term use may make your bowels dependent on the medicine.      •Take your medicine as directed. Contact your healthcare provider if you think your medicine is not helping or if you have side effects. Tell him of her if you are allergic to any medicine. Keep a list of the medicines, vitamins, and herbs you take. Include the amounts, and when and why you take them. Bring the list or the pill bottles to follow-up visits. Carry your medicine list with you in case of an emergency.      Relieve constipation:   •A suppository may be used to help soften your bowel movements. This may make them easier to pass. A suppository is guided into your rectum through your anus.  Suppository for Constipation           •An enema is liquid medicine used to clear bowel movement from your rectum. The medicine is put into your rectum through your anus.  Enemas           Prevent constipation:   •Drink liquids as directed. You may need to drink extra liquids to help soften and move your bowels. Ask how much liquid to drink each day and which liquids are best for you.      •Eat high-fiber foods. This may help decrease constipation by adding bulk to your bowel movements. High-fiber foods include fruit, vegetables, whole-grain breads and cereals, and beans. Your healthcare provider or dietitian can help you create a high-fiber meal plan. Your provider may also recommend a fiber supplement if you cannot get enough fiber from food.             •Exercise regularly. Regular physical activity can help stimulate your intestines. Walking is a good exercise to prevent or relieve constipation. Ask which exercises are best for you.  [employer] [facility] Walking for Exercise           •Schedule a time each day to have a bowel movement. This may help train your body to have regular bowel movements. Bend forward while you are on the toilet to help move the bowel movement out. Sit on the toilet for at least 10 minutes, even if you do not have a bowel movement.      •Talk to your healthcare provider about your medicines. Certain medicines, such as opioids, can cause constipation. Your provider may be able to make medicine changes. For example, he or she may change the kind of medicine, or change when you take it.      Follow up with your healthcare provider as directed: Write down your questions so you remember to ask them during your visits.        Drink oplenty of fluids.  Increase fiber, fruits and vegetables in diet.  Continue Miralax   Follow up with Gastroenterologist.

## 2021-05-27 NOTE — ED STATDOCS - PATIENT PORTAL LINK FT
You can access the FollowMyHealth Patient Portal offered by [facility] by registering at the following website: [email or web address]. By joining [employer]’s FollowMyHealth portal, you will also be able to view your health information using other applications (apps) compatible with our system.

## 2021-05-27 NOTE — ED STATDOCS - ATTENDING CONTRIBUTION TO CARE
I, Glenn Quiroz MD,  performed the initial face to face bedside interview with this patient regarding history of present illness, review of symptoms and relevant past medical, social and family history.  I completed an independent physical examination.  I was the initial provider who evaluated this patient. I have signed out the follow up of any pending tests (i.e. labs, radiological studies) to the ACP.  The ACP will complete the work up, interpret tests, administer medications if necessary and reassess the patient for an appropriate disposition.  If questions arise the ACP is expected to contact me for consultation.

## 2021-06-08 ENCOUNTER — INPATIENT (INPATIENT)
Facility: HOSPITAL | Age: 86
LOS: 7 days | Discharge: HOME HEALTH | DRG: 602 | End: 2021-06-16
Attending: INTERNAL MEDICINE | Admitting: HOSPITALIST
Payer: MEDICARE

## 2021-06-08 VITALS — HEIGHT: 60 IN | WEIGHT: 78.93 LBS

## 2021-06-08 LAB
ALBUMIN SERPL ELPH-MCNC: 2.8 G/DL — LOW (ref 3.3–5)
ALP SERPL-CCNC: 95 U/L — SIGNIFICANT CHANGE UP (ref 40–120)
ALT FLD-CCNC: 27 U/L — SIGNIFICANT CHANGE UP (ref 12–78)
ANION GAP SERPL CALC-SCNC: 3 MMOL/L — LOW (ref 5–17)
APPEARANCE UR: ABNORMAL
APTT BLD: 31.8 SEC — SIGNIFICANT CHANGE UP (ref 27.5–35.5)
AST SERPL-CCNC: 65 U/L — HIGH (ref 15–37)
BASOPHILS # BLD AUTO: 0.03 K/UL — SIGNIFICANT CHANGE UP (ref 0–0.2)
BASOPHILS NFR BLD AUTO: 0.6 % — SIGNIFICANT CHANGE UP (ref 0–2)
BILIRUB SERPL-MCNC: 0.3 MG/DL — SIGNIFICANT CHANGE UP (ref 0.2–1.2)
BILIRUB UR-MCNC: NEGATIVE — SIGNIFICANT CHANGE UP
BUN SERPL-MCNC: 32 MG/DL — HIGH (ref 7–23)
CALCIUM SERPL-MCNC: 9.2 MG/DL — SIGNIFICANT CHANGE UP (ref 8.5–10.1)
CHLORIDE SERPL-SCNC: 101 MMOL/L — SIGNIFICANT CHANGE UP (ref 96–108)
CO2 SERPL-SCNC: 33 MMOL/L — HIGH (ref 22–31)
COLOR SPEC: YELLOW — SIGNIFICANT CHANGE UP
CREAT SERPL-MCNC: 0.95 MG/DL — SIGNIFICANT CHANGE UP (ref 0.5–1.3)
DIFF PNL FLD: NEGATIVE — SIGNIFICANT CHANGE UP
EOSINOPHIL # BLD AUTO: 0.02 K/UL — SIGNIFICANT CHANGE UP (ref 0–0.5)
EOSINOPHIL NFR BLD AUTO: 0.4 % — SIGNIFICANT CHANGE UP (ref 0–6)
GLUCOSE SERPL-MCNC: 95 MG/DL — SIGNIFICANT CHANGE UP (ref 70–99)
GLUCOSE UR QL: NEGATIVE MG/DL — SIGNIFICANT CHANGE UP
HCT VFR BLD CALC: 32.2 % — LOW (ref 34.5–45)
HGB BLD-MCNC: 10.4 G/DL — LOW (ref 11.5–15.5)
IMM GRANULOCYTES NFR BLD AUTO: 0.4 % — SIGNIFICANT CHANGE UP (ref 0–1.5)
INR BLD: 0.97 RATIO — SIGNIFICANT CHANGE UP (ref 0.88–1.16)
KETONES UR-MCNC: NEGATIVE — SIGNIFICANT CHANGE UP
LACTATE SERPL-SCNC: 0.5 MMOL/L — LOW (ref 0.7–2)
LEUKOCYTE ESTERASE UR-ACNC: NEGATIVE — SIGNIFICANT CHANGE UP
LYMPHOCYTES # BLD AUTO: 1.04 K/UL — SIGNIFICANT CHANGE UP (ref 1–3.3)
LYMPHOCYTES # BLD AUTO: 20.4 % — SIGNIFICANT CHANGE UP (ref 13–44)
MCHC RBC-ENTMCNC: 30.3 PG — SIGNIFICANT CHANGE UP (ref 27–34)
MCHC RBC-ENTMCNC: 32.3 GM/DL — SIGNIFICANT CHANGE UP (ref 32–36)
MCV RBC AUTO: 93.9 FL — SIGNIFICANT CHANGE UP (ref 80–100)
MONOCYTES # BLD AUTO: 0.58 K/UL — SIGNIFICANT CHANGE UP (ref 0–0.9)
MONOCYTES NFR BLD AUTO: 11.4 % — SIGNIFICANT CHANGE UP (ref 2–14)
NEUTROPHILS # BLD AUTO: 3.4 K/UL — SIGNIFICANT CHANGE UP (ref 1.8–7.4)
NEUTROPHILS NFR BLD AUTO: 66.8 % — SIGNIFICANT CHANGE UP (ref 43–77)
NITRITE UR-MCNC: NEGATIVE — SIGNIFICANT CHANGE UP
PH UR: 8 — SIGNIFICANT CHANGE UP (ref 5–8)
PLATELET # BLD AUTO: 198 K/UL — SIGNIFICANT CHANGE UP (ref 150–400)
POTASSIUM SERPL-MCNC: 5 MMOL/L — SIGNIFICANT CHANGE UP (ref 3.5–5.3)
POTASSIUM SERPL-SCNC: 5 MMOL/L — SIGNIFICANT CHANGE UP (ref 3.5–5.3)
PROT SERPL-MCNC: 7.2 GM/DL — SIGNIFICANT CHANGE UP (ref 6–8.3)
PROT UR-MCNC: NEGATIVE MG/DL — SIGNIFICANT CHANGE UP
PROTHROM AB SERPL-ACNC: 11.4 SEC — SIGNIFICANT CHANGE UP (ref 10.6–13.6)
RBC # BLD: 3.43 M/UL — LOW (ref 3.8–5.2)
RBC # FLD: 15.3 % — HIGH (ref 10.3–14.5)
SODIUM SERPL-SCNC: 137 MMOL/L — SIGNIFICANT CHANGE UP (ref 135–145)
SP GR SPEC: 1.01 — SIGNIFICANT CHANGE UP (ref 1.01–1.02)
UROBILINOGEN FLD QL: NEGATIVE MG/DL — SIGNIFICANT CHANGE UP
WBC # BLD: 5.09 K/UL — SIGNIFICANT CHANGE UP (ref 3.8–10.5)
WBC # FLD AUTO: 5.09 K/UL — SIGNIFICANT CHANGE UP (ref 3.8–10.5)

## 2021-06-08 PROCEDURE — 93971 EXTREMITY STUDY: CPT | Mod: 26,LT

## 2021-06-08 PROCEDURE — 99285 EMERGENCY DEPT VISIT HI MDM: CPT

## 2021-06-08 PROCEDURE — 93010 ELECTROCARDIOGRAM REPORT: CPT

## 2021-06-08 PROCEDURE — 73590 X-RAY EXAM OF LOWER LEG: CPT | Mod: 26,LT

## 2021-06-08 PROCEDURE — 71250 CT THORAX DX C-: CPT | Mod: 26,MA

## 2021-06-08 PROCEDURE — 93925 LOWER EXTREMITY STUDY: CPT | Mod: 26

## 2021-06-08 PROCEDURE — 74176 CT ABD & PELVIS W/O CONTRAST: CPT | Mod: 26,MA

## 2021-06-08 RX ORDER — KETOCONAZOLE 20 MG/G
1 AEROSOL, FOAM TOPICAL
Qty: 0 | Refills: 0 | DISCHARGE

## 2021-06-08 RX ORDER — OLANZAPINE 15 MG/1
5 TABLET, FILM COATED ORAL ONCE
Refills: 0 | Status: COMPLETED | OUTPATIENT
Start: 2021-06-08 | End: 2021-06-08

## 2021-06-08 RX ORDER — DIAZEPAM 5 MG
0.5 TABLET ORAL
Qty: 0 | Refills: 0 | DISCHARGE

## 2021-06-08 RX ORDER — CYCLOSPORINE 0.5 MG/ML
1 EMULSION OPHTHALMIC
Qty: 0 | Refills: 0 | DISCHARGE

## 2021-06-08 RX ORDER — MIRTAZAPINE 45 MG/1
7.5 TABLET, ORALLY DISINTEGRATING ORAL ONCE
Refills: 0 | Status: COMPLETED | OUTPATIENT
Start: 2021-06-08 | End: 2021-06-08

## 2021-06-08 RX ORDER — ATENOLOL 25 MG/1
1 TABLET ORAL
Qty: 0 | Refills: 0 | DISCHARGE

## 2021-06-08 RX ORDER — ZINC OXIDE 200 MG/G
1 OINTMENT TOPICAL
Qty: 0 | Refills: 0 | DISCHARGE

## 2021-06-08 RX ORDER — FLUTICASONE PROPIONATE 50 MCG
1 SPRAY, SUSPENSION NASAL
Qty: 0 | Refills: 0 | DISCHARGE

## 2021-06-08 RX ORDER — SODIUM CHLORIDE 9 MG/ML
1100 INJECTION INTRAMUSCULAR; INTRAVENOUS; SUBCUTANEOUS ONCE
Refills: 0 | Status: COMPLETED | OUTPATIENT
Start: 2021-06-08 | End: 2021-06-08

## 2021-06-08 RX ORDER — HYDROCORTISONE/PRAMOXINE 2.5 %-1 %
1 CREAM WITH APPLICATOR RECTAL
Qty: 0 | Refills: 0 | DISCHARGE

## 2021-06-08 RX ORDER — BACLOFEN 100 %
1 POWDER (GRAM) MISCELLANEOUS
Qty: 0 | Refills: 0 | DISCHARGE

## 2021-06-08 RX ADMIN — SODIUM CHLORIDE 1100 MILLILITER(S): 9 INJECTION INTRAMUSCULAR; INTRAVENOUS; SUBCUTANEOUS at 19:19

## 2021-06-08 RX ADMIN — Medication 100 MILLIGRAM(S): at 19:19

## 2021-06-08 RX ADMIN — SODIUM CHLORIDE 1100 MILLILITER(S): 9 INJECTION INTRAMUSCULAR; INTRAVENOUS; SUBCUTANEOUS at 20:19

## 2021-06-08 RX ADMIN — Medication 900 MILLIGRAM(S): at 19:49

## 2021-06-08 NOTE — ED ADULT NURSE REASSESSMENT NOTE - NS ED NURSE REASSESS COMMENT FT1
dr. alexander made aware of repeat rectal temperature. additional warm blankets applied to patient and one warm blanket wrapped around patient's head. no additional nursing interventions needed at this time as per dr. alexander.

## 2021-06-08 NOTE — ED PROVIDER NOTE - CARE PLAN
Principal Discharge DX:	Cellulitis of left lower extremity  Secondary Diagnosis:	Closed fracture of one rib of right side, initial encounter

## 2021-06-08 NOTE — ED STATDOCS - PROGRESS NOTE DETAILS
Scribe Jaclyn Casale for attending Dr Walters: 85 y/o female with pmhx of fecal impact, hemorrhoids, HTN presents to the ED c/o L leg swelling associated with redness x1 week. +ulcer at the bottom of L leg. will send to the main ED for further eval and admission. Known drug allergy to Penicillin.

## 2021-06-08 NOTE — ED ADULT NURSE NOTE - NSIMPLEMENTINTERV_GEN_ALL_ED
Implemented All Fall Risk Interventions:  [place] to call system. Call bell, personal items and telephone within reach. Instruct patient to call for assistance. Room bathroom lighting operational. Non-slip footwear when patient is off stretcher. Physically safe environment: no spills, clutter or unnecessary equipment. Stretcher in lowest position, wheels locked, appropriate side rails in place. Provide visual cue, wrist band, yellow gown, etc. Monitor gait and stability. Monitor for mental status changes and reorient to person, place, and time. Review medications for side effects contributing to fall risk. Reinforce activity limits and safety measures with patient and family.

## 2021-06-08 NOTE — ED ADULT TRIAGE NOTE - CHIEF COMPLAINT QUOTE
Pt c/o pain in left leg s/p fall 3 days ago.  Pt reported that she hit her right side.  Denies head strike, LOC or anti-coags

## 2021-06-08 NOTE — ED STATDOCS - NS_ ATTENDINGSCRIBEDETAILS _ED_A_ED_FT
Gloria Walters MD: The history, relevant review of systems, past medical and surgical history, medical decision making, and physical examination was documented by the scribe in my presence and I attest to the accuracy of the documentation.

## 2021-06-08 NOTE — ED ADULT NURSE NOTE - OBJECTIVE STATEMENT
patient axox3, c/o left lower extremity redness and swelling for a few days, worsening since then. patient noticed "bubbles" on her anterior LLE which then developed into sores. patient denies headache, n/v/d, fever, chills, cough, chest pain, SOB. aide at bedside notes that patient fell recently, unknown exactly when because aide was not present at that time. patient has bruising and abrasion to chest.

## 2021-06-08 NOTE — ED PROVIDER NOTE - SKIN, MLM
+LLE erythema extending from foot up to thigh with edema, 2 superficial ulcers to anterior left shin with purulent drainage.

## 2021-06-08 NOTE — ED PROVIDER NOTE - EKG ADDITIONAL QUESTION - PERFORMED INDEPENDENT VISUALIZATION
A catheter (Catheter 6fr 145d Pgtl Crv 110cm 6 Sh Radopq Braid)  was used to cross the aortic valve and perform left ventriculography by hand injection. .  Yes

## 2021-06-08 NOTE — ED PROVIDER NOTE - OBJECTIVE STATEMENT
87 y/o female with a PMHx of HTN presents to the ED c/o LLE redness and swelling for a few days, worsening since onset. Pt noticed "bubbles" on her anterior LLE which then developed into sores. Denies fever, chills. Aide at bedside notes that pt fell recently, unknown exactly when because aide was not present at that time. Pt has bruising and abrasion to chest. Allergies- penicillin- "I think I break out in hives, but I don't know it's been a while". Also allergic to sulfa.

## 2021-06-09 DIAGNOSIS — L03.116 CELLULITIS OF LEFT LOWER LIMB: ICD-10-CM

## 2021-06-09 PROBLEM — K56.41 FECAL IMPACTION: Chronic | Status: ACTIVE | Noted: 2021-05-27

## 2021-06-09 LAB
COVID-19 SPIKE DOMAIN AB INTERP: POSITIVE
COVID-19 SPIKE DOMAIN ANTIBODY RESULT: 74.8 U/ML — HIGH
SARS-COV-2 IGG+IGM SERPL QL IA: 74.8 U/ML — HIGH
SARS-COV-2 IGG+IGM SERPL QL IA: POSITIVE
SARS-COV-2 RNA SPEC QL NAA+PROBE: SIGNIFICANT CHANGE UP

## 2021-06-09 PROCEDURE — 80074 ACUTE HEPATITIS PANEL: CPT

## 2021-06-09 PROCEDURE — 85027 COMPLETE CBC AUTOMATED: CPT

## 2021-06-09 PROCEDURE — 80048 BASIC METABOLIC PNL TOTAL CA: CPT

## 2021-06-09 PROCEDURE — 36415 COLL VENOUS BLD VENIPUNCTURE: CPT

## 2021-06-09 PROCEDURE — 97162 PT EVAL MOD COMPLEX 30 MIN: CPT | Mod: GP

## 2021-06-09 PROCEDURE — 97530 THERAPEUTIC ACTIVITIES: CPT | Mod: GP

## 2021-06-09 PROCEDURE — C1729: CPT

## 2021-06-09 PROCEDURE — 97116 GAIT TRAINING THERAPY: CPT | Mod: GP

## 2021-06-09 PROCEDURE — 85025 COMPLETE CBC W/AUTO DIFF WBC: CPT

## 2021-06-09 PROCEDURE — 99223 1ST HOSP IP/OBS HIGH 75: CPT

## 2021-06-09 PROCEDURE — 76705 ECHO EXAM OF ABDOMEN: CPT

## 2021-06-09 RX ORDER — CEFTRIAXONE 500 MG/1
INJECTION, POWDER, FOR SOLUTION INTRAMUSCULAR; INTRAVENOUS
Refills: 0 | Status: DISCONTINUED | OUTPATIENT
Start: 2021-06-09 | End: 2021-06-16

## 2021-06-09 RX ORDER — CEFTRIAXONE 500 MG/1
1000 INJECTION, POWDER, FOR SOLUTION INTRAMUSCULAR; INTRAVENOUS ONCE
Refills: 0 | Status: COMPLETED | OUTPATIENT
Start: 2021-06-09 | End: 2021-06-09

## 2021-06-09 RX ORDER — MULTIVIT WITH MIN/MFOLATE/K2 340-15/3 G
1 POWDER (GRAM) ORAL ONCE
Refills: 0 | Status: COMPLETED | OUTPATIENT
Start: 2021-06-09 | End: 2021-06-09

## 2021-06-09 RX ORDER — CEFTRIAXONE 500 MG/1
1000 INJECTION, POWDER, FOR SOLUTION INTRAMUSCULAR; INTRAVENOUS EVERY 24 HOURS
Refills: 0 | Status: DISCONTINUED | OUTPATIENT
Start: 2021-06-10 | End: 2021-06-16

## 2021-06-09 RX ORDER — POLYETHYLENE GLYCOL 3350 17 G/17G
17 POWDER, FOR SOLUTION ORAL
Refills: 0 | Status: DISCONTINUED | OUTPATIENT
Start: 2021-06-09 | End: 2021-06-16

## 2021-06-09 RX ORDER — ENOXAPARIN SODIUM 100 MG/ML
30 INJECTION SUBCUTANEOUS DAILY
Refills: 0 | Status: DISCONTINUED | OUTPATIENT
Start: 2021-06-09 | End: 2021-06-16

## 2021-06-09 RX ORDER — LINACLOTIDE 145 UG/1
0 CAPSULE, GELATIN COATED ORAL
Qty: 0 | Refills: 0 | DISCHARGE

## 2021-06-09 RX ADMIN — ENOXAPARIN SODIUM 30 MILLIGRAM(S): 100 INJECTION SUBCUTANEOUS at 17:24

## 2021-06-09 RX ADMIN — Medication 5 MILLIGRAM(S): at 14:01

## 2021-06-09 RX ADMIN — Medication 1 BOTTLE: at 14:01

## 2021-06-09 RX ADMIN — CEFTRIAXONE 1000 MILLIGRAM(S): 500 INJECTION, POWDER, FOR SOLUTION INTRAMUSCULAR; INTRAVENOUS at 14:01

## 2021-06-09 RX ADMIN — OLANZAPINE 5 MILLIGRAM(S): 15 TABLET, FILM COATED ORAL at 00:35

## 2021-06-09 RX ADMIN — POLYETHYLENE GLYCOL 3350 17 GRAM(S): 17 POWDER, FOR SOLUTION ORAL at 22:08

## 2021-06-09 RX ADMIN — MIRTAZAPINE 7.5 MILLIGRAM(S): 45 TABLET, ORALLY DISINTEGRATING ORAL at 00:35

## 2021-06-09 NOTE — H&P ADULT - HISTORY OF PRESENT ILLNESS
87 y/o female with a PMHx of HTN presents to the ED c/o LLE redness and swelling for a few days, worsening since onset. Pt noticed "bubbles" on her anterior LLE which then developed into sores. Denies fever, chills. Aide at bedside notes that pt fell recently, unknown exactly when because aide was not present at that time. Pt has bruising and abrasion to chest. Allergies- penicillin- "I think I break out in hives, but I don't know it's been a while". Also allergic to sulf 87 y/o female with a PMHx of HTN and likley underlying alziehemers,  presents to the ED c/o LLE redness and swelling for a few days.   Patient is a fairly poor historian. Patient states she scraped her leg when she fell at home, and her leg has been worsening in this localized area.   Patient denies any pain to the leg, sob, fevers, diahrrea, n/v

## 2021-06-09 NOTE — H&P ADULT - NSHPPHYSICALEXAM_GEN_ALL_CORE
Vital Signs Last 24 Hrs  T(C): 36.6 (09 Jun 2021 06:50), Max: 37.7 (09 Jun 2021 05:00)  T(F): 97.8 (09 Jun 2021 06:50), Max: 99.8 (09 Jun 2021 05:00)  HR: 76 (09 Jun 2021 06:50) (61 - 76)  BP: 145/61 (09 Jun 2021 06:50) (121/59 - 180/86)  BP(mean): 84 (09 Jun 2021 06:50) (77 - 94)  RR: 18 (09 Jun 2021 06:50) (16 - 18)  SpO2: 96% (09 Jun 2021 06:50) (95% - 100%)    HEENT:   pupils equal and reactive, EOMI, no oropharyngeal lesions, erythema, exudates, oral thrush    NECK:   supple, no carotid bruits, no palpable lymph nodes, no thyromegaly    CV:  +S1, +S2, regular, no murmurs or rubs    RESP:   lungs clear to auscultation bilaterally, no wheezing, rales, rhonchi, good air entry bilaterally    BREAST:  not examined    GI:  abdomen soft, non-tender, non-distended, normal BS, no bruits, no abdominal masses, no palpable masses    RECTAL:  not examined    :  not examined    MSK:   normal muscle tone, no atrophy, no rigidity, no contractions    EXT:   no clubbing, no cyanosis, no edema, no calf pain, swelling or erythema    VASCULAR:  pulses equal and symmetric in the upper and lower extremities    NEURO:  AAOX3, no focal neurological deficits, follows all commands, able to move extremities spontaneously    SKIN:  no ulcers, lesions or rashes

## 2021-06-09 NOTE — H&P ADULT - ASSESSMENT
87 y/o female with a PMHx of HTN presents to the ED c/o LLE redness and swelling for a few days, worsening since onset. Pt noticed "bubbles" on her anterior LLE which then developed into sores. Denies fever, chills. Aide at bedside notes that pt fell recently, unknown exactly when because aide was not present at that time. Pt has bruising and abrasion to chest. Allergies- penicillin- "I think I break out in hives, but I don't know it's been a while". Also allergic to sulf 85 y/o female with a PMHx of HTN and likley underlying alziehemers,  presents to the ED c/o LLE redness and swelling for a few days.   Patient is a fairly poor historian. Patient states she scraped her leg when she fell at home, and her leg has been worsening in this localized area.   Patient denies any pain to the leg, sob, fevers, diahrrea, n/v   Patient admits to no BM for many weeks      1-LLE cellultis with ulcerated area  -start ceftriaxone  -ID consult pending    2-abdominal distention , multifactorial  -patient has not had BM for several weeks , as per patient.   -abdominal very distended and tense to touch  -change diet to clears >> till patient has BM  -miralax  -magnesium citrate     3-HTN  -c/w meds    4-DVT prophy- sc lovenox

## 2021-06-09 NOTE — H&P ADULT - NSHPLABSRESULTS_GEN_ALL_CORE
Urinalysis Basic - ( 2021 22:14 )    Color: Yellow / Appearance: Slightly Turbid / S.015 / pH: x  Gluc: x / Ketone: Negative  / Bili: Negative / Urobili: Negative mg/dL   Blood: x / Protein: Negative mg/dL / Nitrite: Negative   Leuk Esterase: Negative / RBC: Negative /HPF / WBC Negative   Sq Epi: x / Non Sq Epi: Negative / Bacteria: Occasional    2021 18:45    137    |  101    |  32     ----------------------------<  95     5.0     |  33     |  0.95     Ca    9.2        2021 18:45    TPro  7.2    /  Alb  2.8    /  TBili  0.3    /  DBili  x      /  AST  65     /  ALT  27     /  AlkPhos  95     2021 18:45  LIVER FUNCTIONS - ( 2021 18:45 )  Alb: 2.8 g/dL / Pro: 7.2 gm/dL / ALK PHOS: 95 U/L / ALT: 27 U/L / AST: 65 U/L / GGT: x         PT/INR - ( 2021 18:45 )   PT: 11.4 sec;   INR: 0.97 ratio         PTT - ( 2021 18:45 )  PTT:31.8 secCBC Full  -  ( 2021 18:45 )  WBC Count : 5.09 K/uL  Hemoglobin : 10.4 g/dL  Hematocrit : 32.2 %  Platelet Count - Automated : 198 K/uL  Mean Cell Volume : 93.9 fl  Mean Cell Hemoglobin : 30.3 pg  Mean Cell Hemoglobin Concentration : 32.3 gm/dL  Auto Neutrophil # : 3.40 K/uL  Auto Lymphocyte # : 1.04 K/uL  Auto Monocyte # : 0.58 K/uL  Auto Eosinophil # : 0.02 K/uL  Auto Basophil # : 0.03 K/uL  Auto Neutrophil % : 66.8 %  Auto Lymphocyte % : 20.4 %  Auto Monocyte % : 11.4 %  Auto Eosinophil % : 0.4 %  Auto Basophil % : 0.6 %

## 2021-06-09 NOTE — ED ADULT NURSE REASSESSMENT NOTE - NS ED NURSE REASSESS COMMENT FT1
Pt. is resting in bed- Perineal care performed- Pt. pending admission to unit- Will cont to monitor patient closely- Safety maintained

## 2021-06-10 LAB
ANION GAP SERPL CALC-SCNC: 3 MMOL/L — LOW (ref 5–17)
BASOPHILS # BLD AUTO: 0 K/UL — SIGNIFICANT CHANGE UP (ref 0–0.2)
BASOPHILS NFR BLD AUTO: 0 % — SIGNIFICANT CHANGE UP (ref 0–2)
BUN SERPL-MCNC: 22 MG/DL — SIGNIFICANT CHANGE UP (ref 7–23)
CALCIUM SERPL-MCNC: 9 MG/DL — SIGNIFICANT CHANGE UP (ref 8.5–10.1)
CHLORIDE SERPL-SCNC: 106 MMOL/L — SIGNIFICANT CHANGE UP (ref 96–108)
CO2 SERPL-SCNC: 29 MMOL/L — SIGNIFICANT CHANGE UP (ref 22–31)
CREAT SERPL-MCNC: 0.67 MG/DL — SIGNIFICANT CHANGE UP (ref 0.5–1.3)
CULTURE RESULTS: SIGNIFICANT CHANGE UP
EOSINOPHIL # BLD AUTO: 0 K/UL — SIGNIFICANT CHANGE UP (ref 0–0.5)
EOSINOPHIL NFR BLD AUTO: 0 % — SIGNIFICANT CHANGE UP (ref 0–6)
GLUCOSE SERPL-MCNC: 76 MG/DL — SIGNIFICANT CHANGE UP (ref 70–99)
HCT VFR BLD CALC: 31.8 % — LOW (ref 34.5–45)
HGB BLD-MCNC: 10.1 G/DL — LOW (ref 11.5–15.5)
LYMPHOCYTES # BLD AUTO: 0.63 K/UL — LOW (ref 1–3.3)
LYMPHOCYTES # BLD AUTO: 23 % — SIGNIFICANT CHANGE UP (ref 13–44)
MCHC RBC-ENTMCNC: 30.5 PG — SIGNIFICANT CHANGE UP (ref 27–34)
MCHC RBC-ENTMCNC: 31.8 GM/DL — LOW (ref 32–36)
MCV RBC AUTO: 96.1 FL — SIGNIFICANT CHANGE UP (ref 80–100)
MONOCYTES # BLD AUTO: 0.11 K/UL — SIGNIFICANT CHANGE UP (ref 0–0.9)
MONOCYTES NFR BLD AUTO: 4 % — SIGNIFICANT CHANGE UP (ref 2–14)
NEUTROPHILS # BLD AUTO: 1.99 K/UL — SIGNIFICANT CHANGE UP (ref 1.8–7.4)
NEUTROPHILS NFR BLD AUTO: 73 % — SIGNIFICANT CHANGE UP (ref 43–77)
NRBC # BLD: SIGNIFICANT CHANGE UP /100 WBCS (ref 0–0)
PLATELET # BLD AUTO: 195 K/UL — SIGNIFICANT CHANGE UP (ref 150–400)
POTASSIUM SERPL-MCNC: 4 MMOL/L — SIGNIFICANT CHANGE UP (ref 3.5–5.3)
POTASSIUM SERPL-SCNC: 4 MMOL/L — SIGNIFICANT CHANGE UP (ref 3.5–5.3)
RBC # BLD: 3.31 M/UL — LOW (ref 3.8–5.2)
RBC # FLD: 15.4 % — HIGH (ref 10.3–14.5)
SODIUM SERPL-SCNC: 138 MMOL/L — SIGNIFICANT CHANGE UP (ref 135–145)
SPECIMEN SOURCE: SIGNIFICANT CHANGE UP
WBC # BLD: 2.73 K/UL — LOW (ref 3.8–10.5)
WBC # FLD AUTO: 2.73 K/UL — LOW (ref 3.8–10.5)

## 2021-06-10 PROCEDURE — 99233 SBSQ HOSP IP/OBS HIGH 50: CPT

## 2021-06-10 RX ORDER — MULTIVIT WITH MIN/MFOLATE/K2 340-15/3 G
1 POWDER (GRAM) ORAL ONCE
Refills: 0 | Status: COMPLETED | OUTPATIENT
Start: 2021-06-10 | End: 2021-06-10

## 2021-06-10 RX ORDER — OLANZAPINE 15 MG/1
5 TABLET, FILM COATED ORAL AT BEDTIME
Refills: 0 | Status: DISCONTINUED | OUTPATIENT
Start: 2021-06-10 | End: 2021-06-16

## 2021-06-10 RX ORDER — VANCOMYCIN HCL 1 G
VIAL (EA) INTRAVENOUS
Refills: 0 | Status: DISCONTINUED | OUTPATIENT
Start: 2021-06-10 | End: 2021-06-11

## 2021-06-10 RX ORDER — MIRTAZAPINE 45 MG/1
7.5 TABLET, ORALLY DISINTEGRATING ORAL AT BEDTIME
Refills: 0 | Status: DISCONTINUED | OUTPATIENT
Start: 2021-06-10 | End: 2021-06-16

## 2021-06-10 RX ORDER — VANCOMYCIN HCL 1 G
500 VIAL (EA) INTRAVENOUS EVERY 24 HOURS
Refills: 0 | Status: DISCONTINUED | OUTPATIENT
Start: 2021-06-11 | End: 2021-06-11

## 2021-06-10 RX ORDER — VANCOMYCIN HCL 1 G
500 VIAL (EA) INTRAVENOUS ONCE
Refills: 0 | Status: COMPLETED | OUTPATIENT
Start: 2021-06-10 | End: 2021-06-10

## 2021-06-10 RX ADMIN — Medication 1 BOTTLE: at 11:44

## 2021-06-10 RX ADMIN — POLYETHYLENE GLYCOL 3350 17 GRAM(S): 17 POWDER, FOR SOLUTION ORAL at 09:30

## 2021-06-10 RX ADMIN — ENOXAPARIN SODIUM 30 MILLIGRAM(S): 100 INJECTION SUBCUTANEOUS at 09:30

## 2021-06-10 RX ADMIN — Medication 100 MILLIGRAM(S): at 11:44

## 2021-06-10 RX ADMIN — CEFTRIAXONE 1000 MILLIGRAM(S): 500 INJECTION, POWDER, FOR SOLUTION INTRAMUSCULAR; INTRAVENOUS at 13:03

## 2021-06-10 RX ADMIN — POLYETHYLENE GLYCOL 3350 17 GRAM(S): 17 POWDER, FOR SOLUTION ORAL at 21:58

## 2021-06-10 NOTE — PHYSICAL THERAPY INITIAL EVALUATION ADULT - PERTINENT HX OF CURRENT PROBLEM, REHAB EVAL
85 y/o female with a PMHx of HTN and likely underlying Alzheimer's,  presents to the ED c/o LLE redness and swelling for a few days.

## 2021-06-10 NOTE — PHYSICAL THERAPY INITIAL EVALUATION ADULT - DIAGNOSIS, PT EVAL
, LLE contusion/abrasion s/p trauma with superimposed cellulitis, ulcerated area LLE contusion/abrasion s/p trauma with superimposed cellulitis, ulcerated area

## 2021-06-10 NOTE — PHYSICAL THERAPY INITIAL EVALUATION ADULT - ADDITIONAL COMMENTS
Pt states that she likes the higher walker height given today and wanted PT to write it down for her. Pt stated that she usually walks outdoors w/ her HHA but feels too weak now to do that.

## 2021-06-10 NOTE — PHYSICAL THERAPY INITIAL EVALUATION ADULT - MODALITIES TREATMENT COMMENTS
Pt OOB in chair, +alarm, NAD, denies pain, pt reports feeling weaker than usual.  CBIR, Tray table in front, +alarm, B LE elevated on stool . L LE redness noted.

## 2021-06-10 NOTE — PHYSICAL THERAPY INITIAL EVALUATION ADULT - LIVES WITH, PROFILE
Pt lives alone in a home w/ 1 LALO and a step down into the den. Pt states she has HHA that come 24:7 hours

## 2021-06-10 NOTE — PHYSICAL THERAPY INITIAL EVALUATION ADULT - GENERAL OBSERVATIONS, REHAB EVAL
Pt seen this afternoon on 5E, NAD, reports feeling tired and just walked the hallway w/ RW. Pt willing to participate after some coaxing.

## 2021-06-10 NOTE — PHYSICAL THERAPY INITIAL EVALUATION ADULT - ACTIVE RANGE OF MOTION EXAMINATION, REHAB EVAL
B shld elevated to 90 deg/bilateral upper extremity Active ROM was WFL (within functional limits)/bilateral  lower extremity Active ROM was WFL (within functional limits)

## 2021-06-10 NOTE — CONSULT NOTE ADULT - ASSESSMENT
89 y/o female with a PMHx of HTN and likley underlying alziehemers, presents to the ED c/o LLE redness and swelling for a few days. Patient is a fairly poor historian. Patient states she scraped her leg when she fell at home, and her leg has been worsening in this localized area. Patient denies any pain to the leg, sob, fevers, diarrhea, n/v. Here pt afebrile, nl wbc ct, CT chest/abd/pelvis anterior R rib acute fracture, abdominopelvic ascites, multiple chronic b/l rib fractures, nonobstructing renal stones. She was given IV clindamycin then started on rocephin for LLE cellulitis.     1. LLE contusion/abrasion s/p trauma with superimposed cellulitis  - US arterial/doppler unremarkable  - agree with IV rocephin 1gm daily  - start vancomycin 500mg daily check trough prior to 4th dose-  - continue with antibiotic coverage  - monitor temps  - leg elevation  - fu cbc  - supportive care  - tolerating abx well so far; no side effects noted  - reason for abx use and side effects reviewed with patient    2. other issues - care per medicine

## 2021-06-11 PROCEDURE — 99233 SBSQ HOSP IP/OBS HIGH 50: CPT

## 2021-06-11 PROCEDURE — 99223 1ST HOSP IP/OBS HIGH 75: CPT

## 2021-06-11 RX ADMIN — OLANZAPINE 5 MILLIGRAM(S): 15 TABLET, FILM COATED ORAL at 22:26

## 2021-06-11 RX ADMIN — POLYETHYLENE GLYCOL 3350 17 GRAM(S): 17 POWDER, FOR SOLUTION ORAL at 22:26

## 2021-06-11 RX ADMIN — MIRTAZAPINE 7.5 MILLIGRAM(S): 45 TABLET, ORALLY DISINTEGRATING ORAL at 22:26

## 2021-06-11 RX ADMIN — CEFTRIAXONE 1000 MILLIGRAM(S): 500 INJECTION, POWDER, FOR SOLUTION INTRAMUSCULAR; INTRAVENOUS at 13:47

## 2021-06-11 RX ADMIN — ENOXAPARIN SODIUM 30 MILLIGRAM(S): 100 INJECTION SUBCUTANEOUS at 11:32

## 2021-06-11 NOTE — PROVIDER CONTACT NOTE (OTHER) - SITUATION
Pt's temperature remains at 95.0 (rectal) w/ no change of status.
notified Dr Mcdermott office of consult spoke to Clarice
notified Dr Ramirez office of admission
notified Susanne of consult

## 2021-06-11 NOTE — CONSULT NOTE ADULT - ATTENDING COMMENTS
86 year old woman admitted with cellulitis and ascities.   Need to find out origin of ascites. Needs tap for cell count and protein/albumin to calculate SAAG and differentiate portal vs non-portal hypertension as causes. Needs age appropriate screening to r/o malignancy if non-pHTN in origin.

## 2021-06-11 NOTE — CONSULT NOTE ADULT - SUBJECTIVE AND OBJECTIVE BOX
Patient is a 86y old  Female who presents with a chief complaint of cellulitis (2021 08:17)    HPI:  89 y/o female with a PMHx of HTN and likley underlying alziehemers, presents to the ED c/o LLE redness and swelling for a few days. Patient is a fairly poor historian. Patient states she scraped her leg when she fell at home, and her leg has been worsening in this localized area. Patient denies any pain to the leg, sob, fevers, diarrhea, n/v. Here pt afebrile, nl wbc ct, CT chest/abd/pelvis anterior R rib acute fracture, abdominopelvic ascites, multiple chronic b/l rib fractures, nonobstructing renal stones. She was given IV clindamycin then started on rocephin for LLE cellulitis.       PMH: as above  PSH: as above  Meds: per reconciliation sheet, noted below  MEDICATIONS  (STANDING):  cefTRIAXone Injectable. 1000 milliGRAM(s) IV Push every 24 hours  cefTRIAXone Injectable.      enoxaparin Injectable 30 milliGRAM(s) SubCutaneous daily  polyethylene glycol 3350 17 Gram(s) Oral two times a day  vancomycin  IVPB          Allergies    Gantrisin (Unknown)  penicillins (Unknown)    Intolerances    sulfa drugs (Unknown)    Social: no smoking, no alcohol, no illegal drugs; no recent travel, no exposure to TB  FAMILY HISTORY:  No pertinent family history in first degree relatives       no history of premature cardiovascular disease in first degree relatives    ROS: the patient denies fever, no chills, no HA, no dizziness, no sore throat, no blurry vision, no CP, no palpitations, no SOB, no cough, no abdominal pain, no diarrhea, no N/V, no dysuria, no leg pain, no claudication, no rash, no joint aches, no rectal pain or bleeding, no night sweats    All other systems reviewed and are negative    Vital Signs Last 24 Hrs  T(C): 36.7 (10 Dmitriy 2021 08:03), Max: 36.7 (10 Dmitriy 2021 08:03)  T(F): 98 (10 Dmitriy 2021 08:03), Max: 98 (10 Dmitriy 2021 08:03)  HR: 71 (10 Dmitriy 2021 08:03) (60 - 71)  BP: 138/58 (10 Dmitriy 2021 08:03) (138/58 - 162/75)  BP(mean): 96 (2021 15:54) (96 - 96)  RR: 18 (10 Dmitriy 2021 00:06) (16 - 18)  SpO2: 100% (10 Dmitriy 2021 08:03) (98% - 100%)  Daily     Daily     PE:  Constitutional: frail looking  HEENT: NC/AT, EOMI, PERRLA, conjunctivae clear; ears and nose atraumatic; pharynx benign  Neck: supple; thyroid not palpable  Back: no tenderness  Respiratory: respiratory effort normal; clear to auscultation  Cardiovascular: S1S2 regular, no murmurs  Abdomen: soft, not tender, not distended, positive BS; liver and spleen WNL  Genitourinary: no suprapubic tenderness  Lymphatic: no LN palpable  Musculoskeletal: no muscle tenderness, no joint swelling or tenderness  Extremities: LLE edema, erythema, warmth, tenderness, anterior shin with 2 abrasions/contusions  Neurological/ Psychiatric: AxOx3, Judgement and insight normal;  moving all extremities  Skin: no rashes; no palpable lesions    Labs: all available labs reviewed                        10.1   2.73  )-----------( 195      ( 10 Dmitriy 2021 07:52 )             31.8     06-10    138  |  106  |  22  ----------------------------<  76  4.0   |  29  |  0.67    Ca    9.0      10 Dmitriy 2021 07:52    TPro  7.2  /  Alb  2.8<L>  /  TBili  0.3  /  DBili  x   /  AST  65<H>  /  ALT  27  /  AlkPhos  95  06-08     LIVER FUNCTIONS - ( 2021 18:45 )  Alb: 2.8 g/dL / Pro: 7.2 gm/dL / ALK PHOS: 95 U/L / ALT: 27 U/L / AST: 65 U/L / GGT: x           Urinalysis Basic - ( 2021 22:14 )    Color: Yellow / Appearance: Slightly Turbid / S.015 / pH: x  Gluc: x / Ketone: Negative  / Bili: Negative / Urobili: Negative mg/dL   Blood: x / Protein: Negative mg/dL / Nitrite: Negative   Leuk Esterase: Negative / RBC: Negative /HPF / WBC Negative   Sq Epi: x / Non Sq Epi: Negative / Bacteria: Occasional          Radiology: all available radiological tests reviewed  < from: CT Abdomen and Pelvis No Cont (21 @ 22:54) >  EXAM:  CT ABDOMEN AND PELVIS                          EXAM:  CT CHEST                            PROCEDURE DATE:  2021          INTERPRETATION:  CLINICAL INFORMATION:  fall, right posterior chest wall pain pain    COMPARISON: CT chest abdomenand pelvis 10/14/2020..    CONTRAST/COMPLICATIONS:  IV Contrast: NONE  Oral Contrast: NONE    PROCEDURE:  Axial CT images were acquired through the chest , abdomen and pelvis without intravenous contrast. Oral contrast: None.  Two-dimensional and MIPreformats were generated.    FINDINGS: This examination is limited without intravenous contrast.  CHEST:  LUNGS AND LARGE AIRWAYS: Patent central airways. No lung consolidation. There is a 4 mm left lower lobe pulmonary nodule.  PLEURA: Tiny right pleural effusion. Bibasilar atelectasis. Negative for pneumothorax..  VESSELS: Limited without intravenous contrast. Calcific atherosclerosis of the thoracic aorta without aneurysmal dilatation. Main pulmonary arterial segment is not dilated..  HEART: Heart size is normal. No pericardial effusion.  MEDIASTINUM AND VERN: No lymphadenopathy.  CHEST WALL AND LOWER NECK: Within normal limits.  BONES: Surgical hardware of the sternum. Mild loss of height of the T3 vertebral body, stable. There is suggestion of an acute fracture of the right anterior second rib, 2:32. Probable bilateral chronic rib fracture deformities are demonstrated.      ABDOMEN AND PELVIS: This examination is limited without oral and intravenous contrast. In addition, this exam is limited due to paucity of mesenteric fat.  LIVER: Noncontrast evaluation of liver demonstrates liver to be grossly intact. Lobulation of the left hepatic lobe is again demonstrated.  BILE DUCTS: Normal caliber. Right hepatic prominence again seen.  GALLBLADDER: Low lying without calcified gallstone..  SPLEEN: Within normal limits.  PANCREAS: Limited, but grossly unremarkable..  ADRENALS: Limited    KIDNEYS/URETERS: Nonobstructing bilateral renal stones. No hydronephrosis..    BLADDER: Distended without bladder wall thickening.  REPRODUCTIVE ORGANS: Small uterus.    Evaluation of bowel is limited without oral contrast, however there is no bowel obstruction. Moderate stool filled colon. Small bowel loops are not dilated.  PERITONEUM: Overall, there is moderate ascites seen adjacent to the liver, spleen and within the pelvis.  VESSELS:  Calcific atherosclerosis and tortuosity of the abdominal aorta without gross aneurysmal dilatation..  RETROPERITONEUM: No gross lymphadenopathy.  ABDOMINAL WALL: Within normal limits.  BONES: Scoliosis and degenerative changes of the spine.    IMPRESSION:    CT CHEST:  1. Suggestion of a acute fracture of the anterior right rib.  2. Tiny right pleural effusion.  3. Multiple chronic bilateral rib fractures.  4. Negative for pneumothorax.  5. Stable 4 mm left lower lobe pulmonary nodule.    CT ABDOMEN AND PELVIS:  1. Limited exam as above.  2. Moderate amount of abdominal and pelvic ascites.  3. Nonobstructing bilateral renal stones.  4. No bowel obstruction or free intraperitoneal air.  5. Scoliosis and degenerative changes of the spine.        Advanced directives addressed: full resuscitation
Patient is a 86y old  Female who presents with a chief complaint of cellulitis (11 Jun 2021 11:56)    HPI:  85 yo female large bm today.   States abdominal distention for years.   Denies ever having an EGD or colonoscopy.   She was suppose too with Dr. Su but never followed up.   Denies any etoh use but + autoimmune.   No abdominal pain, n/v.     PAST MEDICAL & SURGICAL HISTORY:  HTN (hypertension)    Hemorrhoids    Fecal impaction      MEDICATIONS  (STANDING):  cefTRIAXone Injectable. 1000 milliGRAM(s) IV Push every 24 hours  cefTRIAXone Injectable.      doxycycline hyclate Capsule 100 milliGRAM(s) Oral every 12 hours  enoxaparin Injectable 30 milliGRAM(s) SubCutaneous daily  mirtazapine 7.5 milliGRAM(s) Oral at bedtime  OLANZapine Disintegrating Tablet 5 milliGRAM(s) Oral at bedtime  polyethylene glycol 3350 17 Gram(s) Oral two times a day    MEDICATIONS  (PRN):    Allergies    Gantrisin (Unknown)  penicillins (Unknown)    Intolerances    sulfa drugs (Unknown)    SOCIAL HISTORY:  FAMILY HISTORY:  No pertinent family history in first degree relatives      REVIEW OF SYSTEMS:  CONSTITUTIONAL: No weakness, fevers or chills  EYES/ENT: No visual changes;  No vertigo or throat pain   NECK: No pain or stiffness  RESPIRATORY: No cough, wheezing, hemoptysis; No shortness of breath  CARDIOVASCULAR: No chest pain or palpitations  GASTROINTESTINAL: Per HPI.   GENITOURINARY: No dysuria, frequency or hematuria  NEUROLOGICAL: No numbness or weakness  SKIN: + Cellulitis.   PSYCH: Normal mood and affect  All other review of systems is negative unless indicated above.  Vital Signs Last 24 Hrs  T(C): 37.1 (10 Dmitriy 2021 23:42), Max: 37.1 (10 Dmitriy 2021 23:42)  T(F): 98.7 (10 Dmitriy 2021 23:42), Max: 98.7 (10 Dmitriy 2021 23:42)  HR: 70 (10 Dmitriy 2021 21:49) (66 - 70)  BP: 147/78 (10 Dmitriy 2021 21:49) (134/42 - 147/78)  BP(mean): --  RR: 18 (10 Dmitriy 2021 21:49) (18 - 18)  SpO2: 97% (10 Dmitriy 2021 21:49) (97% - 98%)    PHYSICAL EXAM:  Constitutional: Elderly female with multiple medical issues in NAD.   HEENT: MMM  Neck: No LAD  Respiratory: CTAB  Cardiovascular: S1 and S2, RRR, no M/G/R  Gastrointestinal: Distended, not tense or firm, + mild ascites on physical exam.   Extremities: No peripheral edema  Vascular: 2+ peripheral pulses  Neurological: A/O x 3, no focal deficits  Psychiatric: Normal mood, normal affect  Skin: LE cellulitis.     LABS:                        10.1   2.73  )-----------( 195      ( 10 Dmitriy 2021 07:52 )             31.8     06-10    138  |  106  |  22  ----------------------------<  76  4.0   |  29  |  0.67    Ca    9.0      10 Dmitriy 2021 07:52            RADIOLOGY & ADDITIONAL STUDIES:

## 2021-06-11 NOTE — CONSULT NOTE ADULT - ASSESSMENT
87yo female admitted with difficulty ambulating and cellulitis on abx.   Also with constipation s/p large bm this am.     Consulted for ascites of unclear etiology? No seen malignancy on ct scan, but patient with stong family hx of autoimmune diseases along with malignancies.  Also hx of elevated lfts and no recent egd/colonoscopy as patient previously did not f/u with Dr. Su in office.     Patient firmly refusing any further w/u at this time and wants to go home.  This w/u can be done as outpatient however pt will likely not follow up though she states she prefers to f/u in office.      Spoke with Dr. Mosher can consider paracentesis with diagnostics and therapeutics though I do not believe there is moderate amt of ascites at present based on PE. Consider next week if patient is open to it? Also, could send off tumor markers if concerned about malignancy.     Discussed with Dr. Carter, pt, Dr. Godfrey.    85yo female admitted with difficulty ambulating and cellulitis on abx.   Also with constipation s/p large bm this am.     Consulted for ascites of unclear etiology? No seen malignancy on ct scan, but patient with stong family hx of autoimmune diseases along with malignancies.  Also hx of elevated lfts but currently LFTs are normal  No recent egd/colonoscopy as patient previously did not f/u with Dr. Su in office.     Patient firmly refusing any further w/u at this time and wants to go home.  This w/u can be done as outpatient however pt will likely not follow up though she states she prefers to f/u in office.      Spoke with Dr. Mosher can consider paracentesis with diagnostics and therapeutics though I do not believe there is moderate amt of ascites at present based on PE. Consider next week if patient is open to it? Also, could send off tumor/autoimmune markers as well if patient chooses to stay here though patient is currently refusing further work up.     Discussed with Dr. Carter, pt, Dr. Godfrey.

## 2021-06-12 LAB
HCT VFR BLD CALC: 36.7 % — SIGNIFICANT CHANGE UP (ref 34.5–45)
HGB BLD-MCNC: 11.5 G/DL — SIGNIFICANT CHANGE UP (ref 11.5–15.5)
MCHC RBC-ENTMCNC: 30.3 PG — SIGNIFICANT CHANGE UP (ref 27–34)
MCHC RBC-ENTMCNC: 31.3 GM/DL — LOW (ref 32–36)
MCV RBC AUTO: 96.8 FL — SIGNIFICANT CHANGE UP (ref 80–100)
PLATELET # BLD AUTO: 190 K/UL — SIGNIFICANT CHANGE UP (ref 150–400)
RBC # BLD: 3.79 M/UL — LOW (ref 3.8–5.2)
RBC # FLD: 15.4 % — HIGH (ref 10.3–14.5)
WBC # BLD: 3.97 K/UL — SIGNIFICANT CHANGE UP (ref 3.8–10.5)
WBC # FLD AUTO: 3.97 K/UL — SIGNIFICANT CHANGE UP (ref 3.8–10.5)

## 2021-06-12 PROCEDURE — 99232 SBSQ HOSP IP/OBS MODERATE 35: CPT

## 2021-06-12 RX ORDER — ALPRAZOLAM 0.25 MG
0.25 TABLET ORAL EVERY 6 HOURS
Refills: 0 | Status: DISCONTINUED | OUTPATIENT
Start: 2021-06-12 | End: 2021-06-16

## 2021-06-12 RX ADMIN — CEFTRIAXONE 1000 MILLIGRAM(S): 500 INJECTION, POWDER, FOR SOLUTION INTRAMUSCULAR; INTRAVENOUS at 13:25

## 2021-06-12 RX ADMIN — OLANZAPINE 5 MILLIGRAM(S): 15 TABLET, FILM COATED ORAL at 21:46

## 2021-06-12 RX ADMIN — Medication 100 MILLIGRAM(S): at 21:46

## 2021-06-12 RX ADMIN — Medication 0.25 MILLIGRAM(S): at 11:30

## 2021-06-12 RX ADMIN — POLYETHYLENE GLYCOL 3350 17 GRAM(S): 17 POWDER, FOR SOLUTION ORAL at 21:47

## 2021-06-12 RX ADMIN — ENOXAPARIN SODIUM 30 MILLIGRAM(S): 100 INJECTION SUBCUTANEOUS at 09:25

## 2021-06-12 RX ADMIN — Medication 100 MILLIGRAM(S): at 09:25

## 2021-06-12 RX ADMIN — MIRTAZAPINE 7.5 MILLIGRAM(S): 45 TABLET, ORALLY DISINTEGRATING ORAL at 21:46

## 2021-06-12 NOTE — DIETITIAN INITIAL EVALUATION ADULT. - OTHER INFO
87yo female with PMH significant for HTN with underlying alziehemer's p/w LLE redness and swelling for a few days.  Pt admitted with LLE cellulitis with ulcerated area, abd distention with ascites.  Pt seen by GI, pending possible paracentesis?  Pt with constipation as well (large BM on 6/11).

## 2021-06-12 NOTE — DIETITIAN NUTRITION RISK NOTIFICATION - TREATMENT: THE FOLLOWING DIET HAS BEEN RECOMMENDED
Diet, Regular:   Prosource Gelatein Plus     Qty per Day:  3 (06-12-21 @ 12:54) [Pending Verification By Attending]  Diet, DASH/TLC:   Sodium & Cholesterol Restricted  Supplement Feeding Modality:  Oral  Ensure Enlive Cans or Servings Per Day:  1       Frequency:  Three Times a day (06-10-21 @ 18:00) [Active]

## 2021-06-12 NOTE — DIETITIAN INITIAL EVALUATION ADULT. - ADD RECOMMEND
1) consider psych consult 2) change diet Rx to regular with gelatein TID 3) add MVI with minerals daily to ensure 100% RDI met 4) daily wt checks to track/trend changes 5) consider checking vitamin D level and supplement prn

## 2021-06-12 NOTE — DIETITIAN INITIAL EVALUATION ADULT. - PERTINENT MEDS FT
MEDICATIONS  (STANDING):  cefTRIAXone Injectable. 1000 milliGRAM(s) IV Push every 24 hours  cefTRIAXone Injectable.      doxycycline hyclate Capsule 100 milliGRAM(s) Oral every 12 hours  enoxaparin Injectable 30 milliGRAM(s) SubCutaneous daily  mirtazapine 7.5 milliGRAM(s) Oral at bedtime  OLANZapine Disintegrating Tablet 5 milliGRAM(s) Oral at bedtime  polyethylene glycol 3350 17 Gram(s) Oral two times a day    MEDICATIONS  (PRN):  ALPRAZolam 0.25 milliGRAM(s) Oral every 6 hours PRN anxiety

## 2021-06-12 NOTE — DIETITIAN INITIAL EVALUATION ADULT. - ENERGY INTAKE
Fair (>50%) Is not gluten intolerant, believes gluten free options will be less binding.  refusing ensure enlive due to calorie amount.

## 2021-06-12 NOTE — DIETITIAN INITIAL EVALUATION ADULT. - MALNUTRITION
severe malnutrition in chronic illness r/t decreased PO intake 2/2 constipation?  AEB severe muscle/fat wasting; meeting <50% of estimated nutr needs; 22% wt loss x 8 mo severe malnutrition in chronic illness

## 2021-06-12 NOTE — DIETITIAN INITIAL EVALUATION ADULT. - ORAL INTAKE PTA/DIET HISTORY
pt reports PO intake from home; 2 meals/day.  however, pt is very focused on calorie amount of each meal and if it will bind her up or not.  Every option to increase calories/protein is refused as it is too many calories, from conversation it is likely pt has an eating disorder and recommend psych consult.

## 2021-06-13 PROCEDURE — 99232 SBSQ HOSP IP/OBS MODERATE 35: CPT

## 2021-06-13 RX ADMIN — CEFTRIAXONE 1000 MILLIGRAM(S): 500 INJECTION, POWDER, FOR SOLUTION INTRAMUSCULAR; INTRAVENOUS at 13:41

## 2021-06-13 RX ADMIN — POLYETHYLENE GLYCOL 3350 17 GRAM(S): 17 POWDER, FOR SOLUTION ORAL at 22:04

## 2021-06-13 RX ADMIN — ENOXAPARIN SODIUM 30 MILLIGRAM(S): 100 INJECTION SUBCUTANEOUS at 09:38

## 2021-06-13 RX ADMIN — Medication 0.25 MILLIGRAM(S): at 09:38

## 2021-06-13 RX ADMIN — Medication 100 MILLIGRAM(S): at 22:04

## 2021-06-13 RX ADMIN — POLYETHYLENE GLYCOL 3350 17 GRAM(S): 17 POWDER, FOR SOLUTION ORAL at 09:38

## 2021-06-13 RX ADMIN — Medication 100 MILLIGRAM(S): at 09:38

## 2021-06-14 LAB
CULTURE RESULTS: SIGNIFICANT CHANGE UP
CULTURE RESULTS: SIGNIFICANT CHANGE UP
SPECIMEN SOURCE: SIGNIFICANT CHANGE UP
SPECIMEN SOURCE: SIGNIFICANT CHANGE UP

## 2021-06-14 PROCEDURE — 99232 SBSQ HOSP IP/OBS MODERATE 35: CPT

## 2021-06-14 RX ADMIN — CEFTRIAXONE 1000 MILLIGRAM(S): 500 INJECTION, POWDER, FOR SOLUTION INTRAMUSCULAR; INTRAVENOUS at 18:40

## 2021-06-14 RX ADMIN — ENOXAPARIN SODIUM 30 MILLIGRAM(S): 100 INJECTION SUBCUTANEOUS at 09:30

## 2021-06-14 RX ADMIN — Medication 0.25 MILLIGRAM(S): at 09:31

## 2021-06-14 RX ADMIN — POLYETHYLENE GLYCOL 3350 17 GRAM(S): 17 POWDER, FOR SOLUTION ORAL at 09:31

## 2021-06-14 RX ADMIN — Medication 100 MILLIGRAM(S): at 09:31

## 2021-06-14 RX ADMIN — Medication 100 MILLIGRAM(S): at 21:24

## 2021-06-14 NOTE — PROGRESS NOTE ADULT - SUBJECTIVE AND OBJECTIVE BOX
87 y/o female with a PMHx of HTN and likley underlying alziehemers,  presents to the ED c/o LLE redness and swelling for a few days.   Patient is a fairly poor historian. Patient states she scraped her leg when she fell at home, and her leg has been worsening in this localized area.   Patient denies any pain to the leg, sob, fevers, diahrrea, n/v       06/10/21: Patient seen and examined. She denies any abd pain. Wants to advance diet. Discussed with son Gordo on phone in length regarding management and d/c plan.   06/11/21: Patient seen and examined. Had multiple large BMs since last night. No abd pain, nausea, vomiting.       Vital Signs Last 24 Hrs  T(C): 37.1 (10 Dmitriy 2021 23:42), Max: 37.1 (10 Dmitriy 2021 23:42)  T(F): 98.7 (10 Dmitriy 2021 23:42), Max: 98.7 (10 Dmitriy 2021 23:42)  HR: 70 (10 Dmitriy 2021 21:49) (66 - 70)  BP: 147/78 (10 Dmitriy 2021 21:49) (134/42 - 147/78)  BP(mean): --  RR: 18 (10 Dmitriy 2021 21:49) (18 - 18)  SpO2: 97% (10 Dmitriy 2021 21:49) (97% - 98%)        HEENT:   pupils equal and reactive, EOMI, no oropharyngeal lesions, erythema, exudates, oral thrush  NECK:   supple, no carotid bruits, no palpable lymph nodes, no thyromegaly  CV:  +S1, +S2, regular, no murmurs or rubs  RESP:   lungs clear to auscultation bilaterally, no wheezing, rales, rhonchi, good air entry bilaterally  GI:  abdomen soft, non-tender, non-distended, normal BS, no bruits, no abdominal masses, no palpable masses  MSK:   normal muscle tone, no atrophy, no rigidity, no contractions  EXT:   no clubbing, no cyanosis, no edema, no calf pain, swelling or erythema  VASCULAR:  pulses equal and symmetric in the upper and lower extremities  NEURO:  AAOX3, no focal neurological deficits, follows all commands, able to move extremities spontaneously  SKIN:  no ulcers, lesions or rashes                              10.1   2.73  )-----------( 195      ( 10 Dmitriy 2021 07:52 )             31.8     10 Dmitriy 2021 07:52    138    |  106    |  22     ----------------------------<  76     4.0     |  29     |  0.67     Ca    9.0        10 Dmitriy 2021 07:52        MEDICATIONS  (STANDING):  cefTRIAXone Injectable. 1000 milliGRAM(s) IV Push every 24 hours  cefTRIAXone Injectable.      doxycycline hyclate Capsule 100 milliGRAM(s) Oral every 12 hours  enoxaparin Injectable 30 milliGRAM(s) SubCutaneous daily  mirtazapine 7.5 milliGRAM(s) Oral at bedtime  OLANZapine Disintegrating Tablet 5 milliGRAM(s) Oral at bedtime  polyethylene glycol 3350 17 Gram(s) Oral two times a day    MEDICATIONS  (PRN):          Assessment and Plan:   Assessment:  · Assessment	  87 y/o female with a PMHx of HTN and likley underlying alziehemers,  presents to the ED c/o LLE redness and swelling for a few days.   Patient is a fairly poor historian. Patient states she scraped her leg when she fell at home, and her leg has been worsening in this localized area.   Patient denies any pain to the leg, sob, fevers, diahrrea, n/v   Patient admits to no BM for many weeks      1-LLE cellultis with ulcerated area  -Continue IV  ceftriaxone and po doxy, vanco was discontinued  today  -ID consult appreciated    2-abdominal distention , multifactorial  Ascites  -Moved bowel  -GI eval appreciated.  -?paracentesis, will discuss with IR on Monday  -miralax  -magnesium citrate     3-HTN  -c/w meds    4-S/P multiple falls with rib fractures  PT eval appreciated  Fall precautions  Pain meds    5. Severe protein calorie malnutrition  Nutrition eval                                                                            
Date of service: 21 @ 11:29    pt seen and examined  LLE swelling, less redness  has some abd discomfort  afebrile     ROS: no fever or chills; denies dizziness, no HA, no SOB or cough, no abdominal pain, no diarrhea or constipation; no dysuria, no urinary frequency,  no rashes    MEDICATIONS  (STANDING):  cefTRIAXone Injectable. 1000 milliGRAM(s) IV Push every 24 hours  cefTRIAXone Injectable.      doxycycline hyclate Capsule 100 milliGRAM(s) Oral every 12 hours  enoxaparin Injectable 30 milliGRAM(s) SubCutaneous daily  mirtazapine 7.5 milliGRAM(s) Oral at bedtime  OLANZapine Disintegrating Tablet 5 milliGRAM(s) Oral at bedtime  polyethylene glycol 3350 17 Gram(s) Oral two times a day    Vital Signs Last 24 Hrs  T(C): 36.3 (2021 08:07), Max: 36.3 (2021 16:26)  T(F): 97.3 (2021 08:07), Max: 97.4 (2021 16:26)  HR: 72 (2021 08:07) (70 - 83)  BP: 168/92 (2021 08:07) (149/71 - 168/92)  BP(mean): --  RR: 18 (2021 08:07) (18 - 18)  SpO2: 100% (2021 08:07) (100% - 100%)      PE:  Constitutional: frail looking  HEENT: NC/AT, EOMI, PERRLA, conjunctivae clear; ears and nose atraumatic; pharynx benign  Neck: supple; thyroid not palpable  Back: no tenderness  Respiratory: respiratory effort normal; clear to auscultation  Cardiovascular: S1S2 regular, no murmurs  Abdomen: soft, not tender, not distended, positive BS; liver and spleen WNL  Genitourinary: no suprapubic tenderness  Lymphatic: no LN palpable  Musculoskeletal: no muscle tenderness, no joint swelling or tenderness  Extremities: LLE edema, erythema, warmth, tenderness, anterior shin with 2 abrasions/contusions  Neurological/ Psychiatric: AxOx3, Judgement and insight normal;  moving all extremities  Skin: no rashes; no palpable lesions    Labs: all available labs reviewed                        Urinalysis Basic - ( 2021 22:14 )    Color: Yellow / Appearance: Slightly Turbid / S.015 / pH: x  Gluc: x / Ketone: Negative  / Bili: Negative / Urobili: Negative mg/dL   Blood: x / Protein: Negative mg/dL / Nitrite: Negative   Leuk Esterase: Negative / RBC: Negative /HPF / WBC Negative   Sq Epi: x / Non Sq Epi: Negative / Bacteria: Occasional    Culture - Urine (21 @ 22:14)   Specimen Source: .Urine None   Culture Results:   <10,000 CFU/mL Normal Urogenital Paulina Culture - Blood (21 @ 18:45)   Specimen Source: .Blood Blood-Peripheral   Culture Results:   No growth to date. Culture - Blood (21 @ 18:45)   Specimen Source: .Blood Blood-Peripheral   Culture Results:   No growth to date.       Radiology: all available radiological tests reviewed  < from: CT Abdomen and Pelvis No Cont (21 @ 22:54) >  EXAM:  CT ABDOMEN AND PELVIS                          EXAM:  CT CHEST                            PROCEDURE DATE:  2021          INTERPRETATION:  CLINICAL INFORMATION:  fall, right posterior chest wall pain pain    COMPARISON: CT chest abdomenand pelvis 10/14/2020..    CONTRAST/COMPLICATIONS:  IV Contrast: NONE  Oral Contrast: NONE    PROCEDURE:  Axial CT images were acquired through the chest , abdomen and pelvis without intravenous contrast. Oral contrast: None.  Two-dimensional and MIPreformats were generated.    FINDINGS: This examination is limited without intravenous contrast.  CHEST:  LUNGS AND LARGE AIRWAYS: Patent central airways. No lung consolidation. There is a 4 mm left lower lobe pulmonary nodule.  PLEURA: Tiny right pleural effusion. Bibasilar atelectasis. Negative for pneumothorax..  VESSELS: Limited without intravenous contrast. Calcific atherosclerosis of the thoracic aorta without aneurysmal dilatation. Main pulmonary arterial segment is not dilated..  HEART: Heart size is normal. No pericardial effusion.  MEDIASTINUM AND VERN: No lymphadenopathy.  CHEST WALL AND LOWER NECK: Within normal limits.  BONES: Surgical hardware of the sternum. Mild loss of height of the T3 vertebral body, stable. There is suggestion of an acute fracture of the right anterior second rib, 2:32. Probable bilateral chronic rib fracture deformities are demonstrated.      ABDOMEN AND PELVIS: This examination is limited without oral and intravenous contrast. In addition, this exam is limited due to paucity of mesenteric fat.  LIVER: Noncontrast evaluation of liver demonstrates liver to be grossly intact. Lobulation of the left hepatic lobe is again demonstrated.  BILE DUCTS: Normal caliber. Right hepatic prominence again seen.  GALLBLADDER: Low lying without calcified gallstone..  SPLEEN: Within normal limits.  PANCREAS: Limited, but grossly unremarkable..  ADRENALS: Limited    KIDNEYS/URETERS: Nonobstructing bilateral renal stones. No hydronephrosis..    BLADDER: Distended without bladder wall thickening.  REPRODUCTIVE ORGANS: Small uterus.    Evaluation of bowel is limited without oral contrast, however there is no bowel obstruction. Moderate stool filled colon. Small bowel loops are not dilated.  PERITONEUM: Overall, there is moderate ascites seen adjacent to the liver, spleen and within the pelvis.  VESSELS:  Calcific atherosclerosis and tortuosity of the abdominal aorta without gross aneurysmal dilatation..  RETROPERITONEUM: No gross lymphadenopathy.  ABDOMINAL WALL: Within normal limits.  BONES: Scoliosis and degenerative changes of the spine.    IMPRESSION:    CT CHEST:  1. Suggestion of a acute fracture of the anterior right rib.  2. Tiny right pleural effusion.  3. Multiple chronic bilateral rib fractures.  4. Negative for pneumothorax.  5. Stable 4 mm left lower lobe pulmonary nodule.    CT ABDOMEN AND PELVIS:  1. Limited exam as above.  2. Moderate amount of abdominal and pelvic ascites.  3. Nonobstructing bilateral renal stones.  4. No bowel obstruction or free intraperitoneal air.  5. Scoliosis and degenerative changes of the spine.        Advanced directives addressed: full resuscitation
Date of service: 21 @ 11:59    pt seen and examined  feels better  LLE less redness/swelling  afebrile     ROS: no fever or chills; denies dizziness, no HA, no SOB or cough, no abdominal pain, no diarrhea or constipation; no dysuria, no urinary frequency,  no rashes    MEDICATIONS  (STANDING):  cefTRIAXone Injectable. 1000 milliGRAM(s) IV Push every 24 hours  cefTRIAXone Injectable.      doxycycline hyclate Capsule 100 milliGRAM(s) Oral every 12 hours  enoxaparin Injectable 30 milliGRAM(s) SubCutaneous daily  mirtazapine 7.5 milliGRAM(s) Oral at bedtime  OLANZapine Disintegrating Tablet 5 milliGRAM(s) Oral at bedtime  polyethylene glycol 3350 17 Gram(s) Oral two times a day    Vital Signs Last 24 Hrs  T(C): 37.1 (10 Dmitriy 2021 23:42), Max: 37.1 (10 Dmitriy 2021 23:42)  T(F): 98.7 (10 Dmitriy 2021 23:42), Max: 98.7 (10 Dmitriy 2021 23:42)  HR: 70 (10 Dmitriy 2021 21:49) (66 - 70)  BP: 147/78 (10 Dmitriy 2021 21:49) (134/42 - 147/78)  BP(mean): --  RR: 18 (10 Dmitriy 2021 21:49) (18 - 18)  SpO2: 97% (10 Dmitriy 2021 21:49) (97% - 98%)    PE:  Constitutional: frail looking  HEENT: NC/AT, EOMI, PERRLA, conjunctivae clear; ears and nose atraumatic; pharynx benign  Neck: supple; thyroid not palpable  Back: no tenderness  Respiratory: respiratory effort normal; clear to auscultation  Cardiovascular: S1S2 regular, no murmurs  Abdomen: soft, not tender, not distended, positive BS; liver and spleen WNL  Genitourinary: no suprapubic tenderness  Lymphatic: no LN palpable  Musculoskeletal: no muscle tenderness, no joint swelling or tenderness  Extremities: LLE edema, erythema, warmth, tenderness, anterior shin with 2 abrasions/contusions  Neurological/ Psychiatric: AxOx3, Judgement and insight normal;  moving all extremities  Skin: no rashes; no palpable lesions    Labs: all available labs reviewed                                   10.1   2.73  )-----------( 195      ( 10 Dmitriy 2021 07:52 )             31.8     06-10    138  |  106  |  22  ----------------------------<  76  4.0   |  29  |  0.67    Ca    9.0      10 Dmitriy 2021 07:52      LIVER FUNCTIONS - ( 2021 18:45 )  Alb: 2.8 g/dL / Pro: 7.2 gm/dL / ALK PHOS: 95 U/L / ALT: 27 U/L / AST: 65 U/L / GGT: x           Urinalysis Basic - ( 2021 22:14 )    Color: Yellow / Appearance: Slightly Turbid / S.015 / pH: x  Gluc: x / Ketone: Negative  / Bili: Negative / Urobili: Negative mg/dL   Blood: x / Protein: Negative mg/dL / Nitrite: Negative   Leuk Esterase: Negative / RBC: Negative /HPF / WBC Negative   Sq Epi: x / Non Sq Epi: Negative / Bacteria: Occasional    Culture - Urine (21 @ 22:14)   Specimen Source: .Urine None   Culture Results:   <10,000 CFU/mL Normal Urogenital Paulina Culture - Blood (21 @ 18:45)   Specimen Source: .Blood Blood-Peripheral   Culture Results:   No growth to date. Culture - Blood (21 @ 18:45)   Specimen Source: .Blood Blood-Peripheral   Culture Results:   No growth to date.       Radiology: all available radiological tests reviewed  < from: CT Abdomen and Pelvis No Cont (21 @ 22:54) >  EXAM:  CT ABDOMEN AND PELVIS                          EXAM:  CT CHEST                            PROCEDURE DATE:  2021          INTERPRETATION:  CLINICAL INFORMATION:  fall, right posterior chest wall pain pain    COMPARISON: CT chest abdomenand pelvis 10/14/2020..    CONTRAST/COMPLICATIONS:  IV Contrast: NONE  Oral Contrast: NONE    PROCEDURE:  Axial CT images were acquired through the chest , abdomen and pelvis without intravenous contrast. Oral contrast: None.  Two-dimensional and MIPreformats were generated.    FINDINGS: This examination is limited without intravenous contrast.  CHEST:  LUNGS AND LARGE AIRWAYS: Patent central airways. No lung consolidation. There is a 4 mm left lower lobe pulmonary nodule.  PLEURA: Tiny right pleural effusion. Bibasilar atelectasis. Negative for pneumothorax..  VESSELS: Limited without intravenous contrast. Calcific atherosclerosis of the thoracic aorta without aneurysmal dilatation. Main pulmonary arterial segment is not dilated..  HEART: Heart size is normal. No pericardial effusion.  MEDIASTINUM AND VERN: No lymphadenopathy.  CHEST WALL AND LOWER NECK: Within normal limits.  BONES: Surgical hardware of the sternum. Mild loss of height of the T3 vertebral body, stable. There is suggestion of an acute fracture of the right anterior second rib, 2:32. Probable bilateral chronic rib fracture deformities are demonstrated.      ABDOMEN AND PELVIS: This examination is limited without oral and intravenous contrast. In addition, this exam is limited due to paucity of mesenteric fat.  LIVER: Noncontrast evaluation of liver demonstrates liver to be grossly intact. Lobulation of the left hepatic lobe is again demonstrated.  BILE DUCTS: Normal caliber. Right hepatic prominence again seen.  GALLBLADDER: Low lying without calcified gallstone..  SPLEEN: Within normal limits.  PANCREAS: Limited, but grossly unremarkable..  ADRENALS: Limited    KIDNEYS/URETERS: Nonobstructing bilateral renal stones. No hydronephrosis..    BLADDER: Distended without bladder wall thickening.  REPRODUCTIVE ORGANS: Small uterus.    Evaluation of bowel is limited without oral contrast, however there is no bowel obstruction. Moderate stool filled colon. Small bowel loops are not dilated.  PERITONEUM: Overall, there is moderate ascites seen adjacent to the liver, spleen and within the pelvis.  VESSELS:  Calcific atherosclerosis and tortuosity of the abdominal aorta without gross aneurysmal dilatation..  RETROPERITONEUM: No gross lymphadenopathy.  ABDOMINAL WALL: Within normal limits.  BONES: Scoliosis and degenerative changes of the spine.    IMPRESSION:    CT CHEST:  1. Suggestion of a acute fracture of the anterior right rib.  2. Tiny right pleural effusion.  3. Multiple chronic bilateral rib fractures.  4. Negative for pneumothorax.  5. Stable 4 mm left lower lobe pulmonary nodule.    CT ABDOMEN AND PELVIS:  1. Limited exam as above.  2. Moderate amount of abdominal and pelvic ascites.  3. Nonobstructing bilateral renal stones.  4. No bowel obstruction or free intraperitoneal air.  5. Scoliosis and degenerative changes of the spine.        Advanced directives addressed: full resuscitation
85 y/o female with a PMHx of HTN and likley underlying alziehemers,  presents to the ED c/o LLE redness and swelling for a few days.   Patient is a fairly poor historian. Patient states she scraped her leg when she fell at home, and her leg has been worsening in this localized area.   Patient denies any pain to the leg, sob, fevers, diahrrea, n/v       06/10/21: Patient seen and examined. She denies any abd pain. Wants to advance diet. Discussed with son Gordo on phone in length regarding management and d/c plan.   06/11/21: Patient seen and examined. Had multiple large BMs since last night. No abd pain, nausea, vomiting.   06/12/21: Patient seen and examined. LLE swelling and redness improving. Refusing paracentesis.   06/13/21: Still refusing paracentesis. Discussed with son yesterday  06/14/21: Patient seen and examined. Agreeable to paracentesis. Discused with son multiple times today regarding management and d/c plan.       Vital Signs Last 24 Hrs  T(C): 36.3 (14 Jun 2021 08:07), Max: 36.3 (13 Jun 2021 16:26)  T(F): 97.3 (14 Jun 2021 08:07), Max: 97.4 (13 Jun 2021 16:26)  HR: 72 (14 Jun 2021 08:07) (70 - 83)  BP: 168/92 (14 Jun 2021 08:07) (149/71 - 168/92)  BP(mean): --  RR: 18 (14 Jun 2021 08:07) (18 - 18)  SpO2: 100% (14 Jun 2021 08:07) (100% - 100%)        HEENT:   pupils equal and reactive, EOMI, no oropharyngeal lesions, erythema, exudates, oral thrush  NECK:   supple, no carotid bruits, no palpable lymph nodes, no thyromegaly  CV:  +S1, +S2, regular, no murmurs or rubs  RESP:   lungs clear to auscultation bilaterally, no wheezing, rales, rhonchi, good air entry bilaterally  GI:  abdomen soft, non-tender, non-distended, normal BS, no bruits, no abdominal masses, no palpable masses  MSK:   normal muscle tone, no atrophy, no rigidity, no contractions  EXT:   no clubbing, no cyanosis, no edema, no calf pain, swelling or erythema  VASCULAR:  pulses equal and symmetric in the upper and lower extremities  NEURO:  AAOX3, no focal neurological deficits, follows all commands, able to move extremities spontaneously  SKIN:  no ulcers, lesions or rashes                              11.5   3.97  )-----------( 190      ( 12 Jun 2021 08:49 )             36.7                   MEDICATIONS  (STANDING):  cefTRIAXone Injectable. 1000 milliGRAM(s) IV Push every 24 hours  cefTRIAXone Injectable.      doxycycline hyclate Capsule 100 milliGRAM(s) Oral every 12 hours  enoxaparin Injectable 30 milliGRAM(s) SubCutaneous daily  mirtazapine 7.5 milliGRAM(s) Oral at bedtime  OLANZapine Disintegrating Tablet 5 milliGRAM(s) Oral at bedtime  polyethylene glycol 3350 17 Gram(s) Oral two times a day    MEDICATIONS  (PRN):          Assessment and Plan:   Assessment:  · Assessment	  85 y/o female with a PMHx of HTN and likley underlying alziehemers,  presents to the ED c/o LLE redness and swelling for a few days.   Patient is a fairly poor historian. Patient states she scraped her leg when she fell at home, and her leg has been worsening in this localized area.   Patient denies any pain to the leg, sob, fevers, diahrrea, n/v   Patient admits to no BM for many weeks      1-LLE cellultis with ulcerated area  -Continue IV  ceftriaxone and po doxy,   -ID consult and follow up appreciated    2-abdominal distention , multifactorial  Ascites  -Moved bowel  -GI eval appreciated.  -Possible paracentesis tomorrow  -miralax    3-HTN  -c/w meds    4-S/P multiple falls with rib fractures  PT eval appreciated  Fall precautions  Pain meds    5. Severe protein calorie malnutrition  Nutrition eval appreciated    DVT prophylaxis    Possible home tomorrow after paracentesis                                                                            
87 y/o female with a PMHx of HTN and likley underlying alziehemers,  presents to the ED c/o LLE redness and swelling for a few days.   Patient is a fairly poor historian. Patient states she scraped her leg when she fell at home, and her leg has been worsening in this localized area.   Patient denies any pain to the leg, sob, fevers, diahrrea, n/v       06/10/21: Patient seen and examined. She denies any abd pain. Wants to advance diet. Discussed with son Gordo on phone in length regarding management and d/c plan.       Vital Signs Last 24 Hrs  T(C): 36.7 (10 Dmitriy 2021 08:03), Max: 36.7 (10 Dmitriy 2021 08:03)  T(F): 98 (10 Dmitriy 2021 08:03), Max: 98 (10 Dmitriy 2021 08:03)  HR: 71 (10 Dmitriy 2021 08:03) (60 - 71)  BP: 138/58 (10 Dmitriy 2021 08:03) (138/58 - 162/75)  BP(mean): 96 (2021 15:54) (96 - 96)  RR: 18 (10 Dmitriy 2021 00:06) (16 - 18)  SpO2: 100% (10 Dmitriy 2021 08:03) (98% - 100%)        HEENT:   pupils equal and reactive, EOMI, no oropharyngeal lesions, erythema, exudates, oral thrush  NECK:   supple, no carotid bruits, no palpable lymph nodes, no thyromegaly  CV:  +S1, +S2, regular, no murmurs or rubs  RESP:   lungs clear to auscultation bilaterally, no wheezing, rales, rhonchi, good air entry bilaterally  GI:  abdomen soft, non-tender, non-distended, normal BS, no bruits, no abdominal masses, no palpable masses  MSK:   normal muscle tone, no atrophy, no rigidity, no contractions  EXT:   no clubbing, no cyanosis, no edema, no calf pain, swelling or erythema  VASCULAR:  pulses equal and symmetric in the upper and lower extremities  NEURO:  AAOX3, no focal neurological deficits, follows all commands, able to move extremities spontaneously  SKIN:  no ulcers, lesions or rashes                                  10.1   2.73  )-----------( 195      ( 10 Dmitriy 2021 07:52 )             31.8     10 Dmitriy 2021 07:52    138    |  106    |  22     ----------------------------<  76     4.0     |  29     |  0.67     Ca    9.0        10 Dmitriy 2021 07:52    TPro  7.2    /  Alb  2.8    /  TBili  0.3    /  DBili  x      /  AST  65     /  ALT  27     /  AlkPhos  95     2021 18:45    LIVER FUNCTIONS - ( 2021 18:45 )  Alb: 2.8 g/dL / Pro: 7.2 gm/dL / ALK PHOS: 95 U/L / ALT: 27 U/L / AST: 65 U/L / GGT: x           PT/INR - ( 2021 18:45 )   PT: 11.4 sec;   INR: 0.97 ratio         PTT - ( 2021 18:45 )  PTT:31.8 sec  CAPILLARY BLOOD GLUCOSE            Urinalysis Basic - ( 2021 22:14 )    Color: Yellow / Appearance: Slightly Turbid / S.015 / pH: x  Gluc: x / Ketone: Negative  / Bili: Negative / Urobili: Negative mg/dL   Blood: x / Protein: Negative mg/dL / Nitrite: Negative   Leuk Esterase: Negative / RBC: Negative /HPF / WBC Negative   Sq Epi: x / Non Sq Epi: Negative / Bacteria: Occasional            MEDICATIONS  (STANDING):  cefTRIAXone Injectable. 1000 milliGRAM(s) IV Push every 24 hours  cefTRIAXone Injectable.      enoxaparin Injectable 30 milliGRAM(s) SubCutaneous daily  polyethylene glycol 3350 17 Gram(s) Oral two times a day  vancomycin  IVPB        MEDICATIONS  (PRN):          Assessment and Plan:   Assessment:  · Assessment	  87 y/o female with a PMHx of HTN and likley underlying alziehemers,  presents to the ED c/o LLE redness and swelling for a few days.   Patient is a fairly poor historian. Patient states she scraped her leg when she fell at home, and her leg has been worsening in this localized area.   Patient denies any pain to the leg, sob, fevers, diahrrea, n/v   Patient admits to no BM for many weeks      1-LLE cellultis with ulcerated area  -Continue IV  ceftriaxone and vancomycin  -ID consult appreciated    2-abdominal distention , multifactorial  Ascites  -patient has not had BM for several weeks , as per patient.   -Consult Dr TIN Mcdermott  -miralax  -magnesium citrate     3-HTN  -c/w meds    4-S/P multiple falls with rib fractures  PT eval appreciated  Fall precautions  Pain meds    5. Severe protein calorie malnutrition  Nutrition eval                                                                            
87 y/o female with a PMHx of HTN and likley underlying alziehemers,  presents to the ED c/o LLE redness and swelling for a few days.   Patient is a fairly poor historian. Patient states she scraped her leg when she fell at home, and her leg has been worsening in this localized area.   Patient denies any pain to the leg, sob, fevers, diahrrea, n/v       06/10/21: Patient seen and examined. She denies any abd pain. Wants to advance diet. Discussed with son Gordo on phone in length regarding management and d/c plan.   06/11/21: Patient seen and examined. Had multiple large BMs since last night. No abd pain, nausea, vomiting.   06/12/21: Patient seen and examined. LLE swelling and redness improving. Refusing paracentesis.   06/13/21: Still refusing paracentesis. Discussed with son yesterday      Vital Signs Last 24 Hrs  T(C): 36.2 (13 Jun 2021 08:02), Max: 36.7 (12 Jun 2021 15:41)  T(F): 97.2 (13 Jun 2021 08:02), Max: 98 (12 Jun 2021 15:41)  HR: 77 (13 Jun 2021 08:02) (77 - 79)  BP: 174/66 (13 Jun 2021 08:02) (146/70 - 174/66)  BP(mean): 1 (12 Jun 2021 15:41) (1 - 1)  RR: 18 (13 Jun 2021 08:02) (18 - 18)  SpO2: 100% (13 Jun 2021 08:02) (100% - 100%)        HEENT:   pupils equal and reactive, EOMI, no oropharyngeal lesions, erythema, exudates, oral thrush  NECK:   supple, no carotid bruits, no palpable lymph nodes, no thyromegaly  CV:  +S1, +S2, regular, no murmurs or rubs  RESP:   lungs clear to auscultation bilaterally, no wheezing, rales, rhonchi, good air entry bilaterally  GI:  abdomen soft, non-tender, non-distended, normal BS, no bruits, no abdominal masses, no palpable masses  MSK:   normal muscle tone, no atrophy, no rigidity, no contractions  EXT:   no clubbing, no cyanosis, no edema, no calf pain, swelling or erythema  VASCULAR:  pulses equal and symmetric in the upper and lower extremities  NEURO:  AAOX3, no focal neurological deficits, follows all commands, able to move extremities spontaneously  SKIN:  no ulcers, lesions or rashes                              11.5   3.97  )-----------( 190      ( 12 Jun 2021 08:49 )             36.7                   MEDICATIONS  (STANDING):  cefTRIAXone Injectable. 1000 milliGRAM(s) IV Push every 24 hours  cefTRIAXone Injectable.      doxycycline hyclate Capsule 100 milliGRAM(s) Oral every 12 hours  enoxaparin Injectable 30 milliGRAM(s) SubCutaneous daily  mirtazapine 7.5 milliGRAM(s) Oral at bedtime  OLANZapine Disintegrating Tablet 5 milliGRAM(s) Oral at bedtime  polyethylene glycol 3350 17 Gram(s) Oral two times a day    MEDICATIONS  (PRN):          Assessment and Plan:   Assessment:  · Assessment	  87 y/o female with a PMHx of HTN and likley underlying alziehemers,  presents to the ED c/o LLE redness and swelling for a few days.   Patient is a fairly poor historian. Patient states she scraped her leg when she fell at home, and her leg has been worsening in this localized area.   Patient denies any pain to the leg, sob, fevers, diahrrea, n/v   Patient admits to no BM for many weeks      1-LLE cellultis with ulcerated area  -Continue IV  ceftriaxone and po doxy, vanco was discontinued    -ID consult appreciated    2-abdominal distention , multifactorial  Ascites  -Moved bowel  -GI eval appreciated.  -?paracentesis, will discuss with IR on Monday. Patient refusing at this time  -miralax    3-HTN  -c/w meds    4-S/P multiple falls with rib fractures  PT eval appreciated  Fall precautions  Pain meds    5. Severe protein calorie malnutrition  Nutrition eval appreciated    DVT prophylaxis                                                                            
87 y/o female with a PMHx of HTN and likley underlying alziehemers,  presents to the ED c/o LLE redness and swelling for a few days.   Patient is a fairly poor historian. Patient states she scraped her leg when she fell at home, and her leg has been worsening in this localized area.   Patient denies any pain to the leg, sob, fevers, diahrrea, n/v       06/10/21: Patient seen and examined. She denies any abd pain. Wants to advance diet. Discussed with son Gordo on phone in length regarding management and d/c plan.   06/11/21: Patient seen and examined. Had multiple large BMs since last night. No abd pain, nausea, vomiting.   06/12/21: Patient seen and examined. LLE swelling and redness improving. Refusing paracentesis.       Vital Signs Last 24 Hrs  T(C): 36.5 (12 Jun 2021 08:34), Max: 36.9 (11 Jun 2021 15:28)  T(F): 97.7 (12 Jun 2021 08:34), Max: 98.4 (11 Jun 2021 15:28)  HR: 71 (12 Jun 2021 08:34) (68 - 71)  BP: 176/58 (12 Jun 2021 08:34) (98/63 - 176/58)  BP(mean): --  RR: 18 (12 Jun 2021 08:34) (18 - 18)  SpO2: 100% (12 Jun 2021 08:34) (98% - 100%)        HEENT:   pupils equal and reactive, EOMI, no oropharyngeal lesions, erythema, exudates, oral thrush  NECK:   supple, no carotid bruits, no palpable lymph nodes, no thyromegaly  CV:  +S1, +S2, regular, no murmurs or rubs  RESP:   lungs clear to auscultation bilaterally, no wheezing, rales, rhonchi, good air entry bilaterally  GI:  abdomen soft, non-tender, non-distended, normal BS, no bruits, no abdominal masses, no palpable masses  MSK:   normal muscle tone, no atrophy, no rigidity, no contractions  EXT:   no clubbing, no cyanosis, no edema, no calf pain, swelling or erythema  VASCULAR:  pulses equal and symmetric in the upper and lower extremities  NEURO:  AAOX3, no focal neurological deficits, follows all commands, able to move extremities spontaneously  SKIN:  no ulcers, lesions or rashes                              11.5   3.97  )-----------( 190      ( 12 Jun 2021 08:49 )             36.7                   MEDICATIONS  (STANDING):  cefTRIAXone Injectable. 1000 milliGRAM(s) IV Push every 24 hours  cefTRIAXone Injectable.      doxycycline hyclate Capsule 100 milliGRAM(s) Oral every 12 hours  enoxaparin Injectable 30 milliGRAM(s) SubCutaneous daily  mirtazapine 7.5 milliGRAM(s) Oral at bedtime  OLANZapine Disintegrating Tablet 5 milliGRAM(s) Oral at bedtime  polyethylene glycol 3350 17 Gram(s) Oral two times a day    MEDICATIONS  (PRN):          Assessment and Plan:   Assessment:  · Assessment	  87 y/o female with a PMHx of HTN and likley underlying alziehemers,  presents to the ED c/o LLE redness and swelling for a few days.   Patient is a fairly poor historian. Patient states she scraped her leg when she fell at home, and her leg has been worsening in this localized area.   Patient denies any pain to the leg, sob, fevers, diahrrea, n/v   Patient admits to no BM for many weeks      1-LLE cellultis with ulcerated area  -Continue IV  ceftriaxone and po doxy, vanco was discontinued    -ID consult appreciated    2-abdominal distention , multifactorial  Ascites  -Moved bowel  -GI eval appreciated.  -?paracentesis, will discuss with IR on Monday. Patient refusing at this time  -miralax    3-HTN  -c/w meds    4-S/P multiple falls with rib fractures  PT eval appreciated  Fall precautions  Pain meds    5. Severe protein calorie malnutrition  Nutrition eval appreciated    DVT prophylaxis

## 2021-06-14 NOTE — PROGRESS NOTE ADULT - NUTRITIONAL ASSESSMENT
This patient has been assessed with a concern for Malnutrition and has been determined to have a diagnosis/diagnoses of Severe protein-calorie malnutrition and Underweight (BMI < 19).    This patient is being managed with:   Diet DASH/TLC-  Sodium & Cholesterol Restricted  Supplement Feeding Modality:  Oral  Ensure Enlive Cans or Servings Per Day:  1       Frequency:  Three Times a day  Entered: Dmitriy 10 2021  6:00PM    The following pending diet order is being considered for treatment of Severe protein-calorie malnutrition and Underweight (BMI < 19):  Diet Regular-  Prosource Gelatein Plus     Qty per Day:  3  Entered: Jun 12 2021 12:54PM  
This patient has been assessed with a concern for Malnutrition and has been determined to have a diagnosis/diagnoses of Severe protein-calorie malnutrition and Underweight (BMI < 19).    This patient is being managed with:   Diet Regular-  Prosource Gelatein Plus     Qty per Day:  3  Entered: Jun 12 2021 12:54PM    
This patient has been assessed with a concern for Malnutrition and has been determined to have a diagnosis/diagnoses of Severe protein-calorie malnutrition and Underweight (BMI < 19).    This patient is being managed with:   Diet Regular-  Prosource Gelatein Plus     Qty per Day:  3  Entered: Jun 12 2021 12:54PM

## 2021-06-14 NOTE — PROGRESS NOTE ADULT - ASSESSMENT
89 y/o female with a PMHx of HTN and likley underlying alziehemers, presents to the ED c/o LLE redness and swelling for a few days. Patient is a fairly poor historian. Patient states she scraped her leg when she fell at home, and her leg has been worsening in this localized area. Patient denies any pain to the leg, sob, fevers, diarrhea, n/v. Here pt afebrile, nl wbc ct, CT chest/abd/pelvis anterior R rib acute fracture, abdominopelvic ascites, multiple chronic b/l rib fractures, nonobstructing renal stones. She was given IV clindamycin then started on rocephin for LLE cellulitis.     1. LLE contusion/abrasion s/p trauma with superimposed cellulitis  - US arterial/doppler unremarkable  - on IV rocephin 1gm daily #3  - s/p vancomycin x 1, clindamycin x 1 6/9-6/10, dc vanco  - start po doxycycline 100mg BID  - continue with antibiotic coverage  - monitor temps  - leg elevation  - fu cbc  - supportive care  - tolerating abx well so far; no side effects noted  - reason for abx use and side effects reviewed with patient  - on dc plan for po doxy complete 7-10 day course     2. other issues - care per medicine 
89 y/o female with a PMHx of HTN and likley underlying alziehemers, presents to the ED c/o LLE redness and swelling for a few days. Patient is a fairly poor historian. Patient states she scraped her leg when she fell at home, and her leg has been worsening in this localized area. Patient denies any pain to the leg, sob, fevers, diarrhea, n/v. Here pt afebrile, nl wbc ct, CT chest/abd/pelvis anterior R rib acute fracture, abdominopelvic ascites, multiple chronic b/l rib fractures, nonobstructing renal stones. She was given IV clindamycin then started on rocephin for LLE cellulitis.     1. LLE contusion/abrasion s/p trauma with superimposed cellulitis  - US arterial/doppler unremarkable  - on IV rocephin 1gm daily #6/7  - s/p vancomycin x 1, clindamycin x 1 6/9-6/10, vanco discontinued 6/11  - on po doxycycline 100mg BID #4  - continue with antibiotic coverage  - possible paracentesis to eval ascitic fluid f/u studies/cx  - monitor temps  - leg elevation  - fu cbc  - supportive care  - tolerating abx well so far; no side effects noted  - reason for abx use and side effects reviewed with patient  - on dc plan for po doxy complete 7-10 day course     2. other issues - care per medicine

## 2021-06-15 ENCOUNTER — TRANSCRIPTION ENCOUNTER (OUTPATIENT)
Age: 86
End: 2021-06-15

## 2021-06-15 PROCEDURE — 99232 SBSQ HOSP IP/OBS MODERATE 35: CPT

## 2021-06-15 PROCEDURE — 76705 ECHO EXAM OF ABDOMEN: CPT | Mod: 26

## 2021-06-15 RX ORDER — LOSARTAN POTASSIUM 100 MG/1
50 TABLET, FILM COATED ORAL DAILY
Refills: 0 | Status: DISCONTINUED | OUTPATIENT
Start: 2021-06-15 | End: 2021-06-16

## 2021-06-15 RX ADMIN — Medication 100 MILLIGRAM(S): at 10:26

## 2021-06-15 RX ADMIN — Medication 100 MILLIGRAM(S): at 22:28

## 2021-06-15 RX ADMIN — LOSARTAN POTASSIUM 50 MILLIGRAM(S): 100 TABLET, FILM COATED ORAL at 14:16

## 2021-06-15 RX ADMIN — CEFTRIAXONE 1000 MILLIGRAM(S): 500 INJECTION, POWDER, FOR SOLUTION INTRAMUSCULAR; INTRAVENOUS at 10:25

## 2021-06-15 RX ADMIN — OLANZAPINE 5 MILLIGRAM(S): 15 TABLET, FILM COATED ORAL at 22:28

## 2021-06-15 RX ADMIN — POLYETHYLENE GLYCOL 3350 17 GRAM(S): 17 POWDER, FOR SOLUTION ORAL at 10:26

## 2021-06-15 RX ADMIN — MIRTAZAPINE 7.5 MILLIGRAM(S): 45 TABLET, ORALLY DISINTEGRATING ORAL at 22:28

## 2021-06-15 NOTE — DISCHARGE NOTE PROVIDER - NSDCCPCAREPLAN_GEN_ALL_CORE_FT
PRINCIPAL DISCHARGE DIAGNOSIS  Diagnosis: Cellulitis of left lower extremity  Assessment and Plan of Treatment: Continue antibiotic as ordered. Follow up with PMD      SECONDARY DISCHARGE DIAGNOSES  Diagnosis: Closed fracture of one rib of right side, initial encounter  Assessment and Plan of Treatment:

## 2021-06-15 NOTE — DISCHARGE NOTE PROVIDER - HOSPITAL COURSE
85 y/o female with a PMHx of HTN and likley underlying alziehemers,  presents to the ED c/o LLE redness and swelling for a few days.   Patient is a fairly poor historian. Patient states she scraped her leg when she fell at home, and her leg has been worsening in this localized area.   Patient denies any pain to the leg, sob, fevers, diahrrea, n/v   She was admutted to medical floor. She was started on IV rocephin and po doxy. Seen by Dr Donahue. She had CT abd/pelvis on admission that showed moderate ascites.   She was given Mg citrate for severe constipation. He had then multiple BMs. Discussed with son Gordo multiple times regarding management and d/c plan.   Patient was also seen by Dr Georges meléndez. She went for paracentesis today-no ascitic fluid to remove. Patient is medically stable for discharge      Vital Signs Last 24 Hrs  T(C): 36.2 (15 Dmitriy 2021 07:53), Max: 36.7 (14 Jun 2021 16:28)  T(F): 97.2 (15 Dmitriy 2021 07:53), Max: 98 (14 Jun 2021 16:28)  HR: 87 (15 Dmitriy 2021 13:42) (62 - 88)  BP: 171/77 (15 Dmitriy 2021 13:42) (150/75 - 171/77)  BP(mean): --  RR: 18 (15 Dmitriy 2021 07:53) (18 - 18)  SpO2: 99% (15 Dmitriy 2021 07:53) (99% - 100%)        HEENT:   pupils equal and reactive, EOMI, no oropharyngeal lesions, erythema, exudates, oral thrush  NECK:   supple, no carotid bruits, no palpable lymph nodes, no thyromegaly  CV:  +S1, +S2, regular, no murmurs or rubs  RESP:   lungs clear to auscultation bilaterally, no wheezing, rales, rhonchi, good air entry bilaterally  GI:  abdomen soft, non-tender, non-distended, normal BS, no bruits, no abdominal masses, no palpable masses  MSK:   normal muscle tone, no atrophy, no rigidity, no contractions  EXT:   no clubbing, no cyanosis, no edema, no calf pain, swelling or erythema  VASCULAR:  pulses equal and symmetric in the upper and lower extremities  NEURO:  AAOX3, no focal neurological deficits, follows all commands, able to move extremities spontaneously  SKIN:  no ulcers, lesions or rashes        1-LLE cellultis with ulcerated area  -On IV  ceftriaxone and po doxy, D/C on po doxy  -ID consult and follow up appreciated    2-abdominal distention , multifactorial  Ascites, resolved. No ascites today as per IR  -Moved bowel  -GI eval appreciated.  -No paracentesis done, no ascites  -miralax    3-HTN  -c/w meds    4-S/P multiple falls with rib fractures  PT eval appreciated  Fall precautions  Pain meds    5. Severe protein calorie malnutrition  Nutrition eval appreciated    Home today  PMD aware of discharge  Spent more than 30 min to prepare the discharge

## 2021-06-15 NOTE — DISCHARGE NOTE PROVIDER - CARE PROVIDER_API CALL
Herbert Ramirez  INTERNAL MEDICINE  [address] [place] [address]  Phone: [phone]  Fax: [phone]  Follow Up Time:

## 2021-06-15 NOTE — DISCHARGE NOTE NURSING/CASE MANAGEMENT/SOCIAL WORK - PATIENT PORTAL LINK FT
You can access the FollowMyHealth Patient Portal offered by [facility] by registering at the following website: [email or web address]. By joining [facility]’s FollowMyHealth portal, you will also be able to view your health information using other applications (apps) compatible with our system.

## 2021-06-15 NOTE — DISCHARGE NOTE PROVIDER - DETAILS OF MALNUTRITION DIAGNOSIS/DIAGNOSES
This patient has been assessed with a concern for Malnutrition and was treated during this hospitalization for the following Nutrition diagnosis/diagnoses:     -  06/12/2021: Severe protein-calorie malnutrition   -  06/12/2021: Underweight (BMI < 19)

## 2021-06-16 VITALS
HEART RATE: 63 BPM | DIASTOLIC BLOOD PRESSURE: 77 MMHG | SYSTOLIC BLOOD PRESSURE: 177 MMHG | RESPIRATION RATE: 17 BRPM | TEMPERATURE: 98 F | OXYGEN SATURATION: 100 %

## 2021-06-16 PROCEDURE — 99239 HOSP IP/OBS DSCHRG MGMT >30: CPT

## 2021-06-16 RX ADMIN — Medication 100 MILLIGRAM(S): at 11:00

## 2021-06-16 RX ADMIN — POLYETHYLENE GLYCOL 3350 17 GRAM(S): 17 POWDER, FOR SOLUTION ORAL at 10:59

## 2021-06-16 RX ADMIN — LOSARTAN POTASSIUM 50 MILLIGRAM(S): 100 TABLET, FILM COATED ORAL at 10:58

## 2021-06-16 RX ADMIN — ENOXAPARIN SODIUM 30 MILLIGRAM(S): 100 INJECTION SUBCUTANEOUS at 11:00

## 2021-06-23 DIAGNOSIS — S22.31XA FRACTURE OF ONE RIB, RIGHT SIDE, INITIAL ENCOUNTER FOR CLOSED FRACTURE: ICD-10-CM
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
## 2021-06-23 DIAGNOSIS — E43 UNSPECIFIED SEVERE PROTEIN-CALORIE MALNUTRITION: ICD-10-CM
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
## 2021-06-23 DIAGNOSIS — F02.80 DEMENTIA IN OTHER DISEASES CLASSIFIED ELSEWHERE, UNSPECIFIED SEVERITY, WITHOUT BEHAVIORAL DISTURBANCE, PSYCHOTIC DISTURBANCE, MOOD DISTURBANCE, AND ANXIETY: ICD-10-CM

## 2021-06-23 DIAGNOSIS — Z88.0 ALLERGY STATUS TO PENICILLIN: ICD-10-CM

## 2021-06-23 DIAGNOSIS — R18.8 OTHER ASCITES: ICD-10-CM

## 2021-06-23 DIAGNOSIS — K59.00 CONSTIPATION, UNSPECIFIED: ICD-10-CM

## 2021-06-23 DIAGNOSIS — N20.0 CALCULUS OF KIDNEY: ICD-10-CM

## 2021-06-23 DIAGNOSIS — L97.929 NON-PRESSURE CHRONIC ULCER OF UNSPECIFIED PART OF LEFT LOWER LEG WITH UNSPECIFIED SEVERITY: ICD-10-CM

## 2021-06-23 DIAGNOSIS — I10 ESSENTIAL (PRIMARY) HYPERTENSION: ICD-10-CM

## 2021-06-23 DIAGNOSIS — G30.9 ALZHEIMER'S DISEASE, UNSPECIFIED: ICD-10-CM

## 2021-06-23 DIAGNOSIS — L03.116 CELLULITIS OF LEFT LOWER LIMB: ICD-10-CM

## 2021-07-16 ENCOUNTER — APPOINTMENT (OUTPATIENT)
Dept: SURGERY | Facility: CLINIC | Age: 86
End: 2021-07-16
Payer: MEDICARE

## 2021-07-16 VITALS
HEART RATE: 86 BPM | BODY MASS INDEX: 14.16 KG/M2 | HEIGHT: 65 IN | DIASTOLIC BLOOD PRESSURE: 70 MMHG | SYSTOLIC BLOOD PRESSURE: 128 MMHG | WEIGHT: 85 LBS

## 2021-07-16 PROCEDURE — 29580 STRAPPING UNNA BOOT: CPT | Mod: LT

## 2021-07-23 ENCOUNTER — APPOINTMENT (OUTPATIENT)
Dept: SURGERY | Facility: CLINIC | Age: 86
End: 2021-07-23
Payer: MEDICARE

## 2021-07-23 DIAGNOSIS — L97.909 VARICOSE VEINS OF UNSPECIFIED LOWER EXTREMITY WITH ULCER OF UNSPECIFIED SITE: ICD-10-CM

## 2021-07-23 DIAGNOSIS — I83.009 VARICOSE VEINS OF UNSPECIFIED LOWER EXTREMITY WITH ULCER OF UNSPECIFIED SITE: ICD-10-CM

## 2021-07-23 PROCEDURE — 99212 OFFICE O/P EST SF 10 MIN: CPT

## 2021-08-01 ENCOUNTER — INPATIENT (INPATIENT)
Facility: HOSPITAL | Age: 86
LOS: 7 days | Discharge: HOME | DRG: 542 | End: 2021-08-09
Attending: SURGERY | Admitting: SURGERY
Payer: MEDICARE

## 2021-08-01 VITALS
OXYGEN SATURATION: 97 % | RESPIRATION RATE: 18 BRPM | DIASTOLIC BLOOD PRESSURE: 90 MMHG | HEART RATE: 76 BPM | TEMPERATURE: 98 F | SYSTOLIC BLOOD PRESSURE: 170 MMHG

## 2021-08-01 DIAGNOSIS — S01.01XA LACERATION WITHOUT FOREIGN BODY OF SCALP, INITIAL ENCOUNTER: ICD-10-CM

## 2021-08-01 DIAGNOSIS — S22.009K: ICD-10-CM
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
## 2021-08-01 LAB
ALBUMIN SERPL ELPH-MCNC: 3.9 G/DL — SIGNIFICANT CHANGE UP (ref 3.3–5.2)
ALP SERPL-CCNC: 80 U/L — SIGNIFICANT CHANGE UP (ref 40–120)
ALT FLD-CCNC: 25 U/L — SIGNIFICANT CHANGE UP
ANION GAP SERPL CALC-SCNC: 9 MMOL/L — SIGNIFICANT CHANGE UP (ref 5–17)
AST SERPL-CCNC: 37 U/L — HIGH
BILIRUB SERPL-MCNC: 0.3 MG/DL — LOW (ref 0.4–2)
BUN SERPL-MCNC: 32.9 MG/DL — HIGH (ref 8–20)
CALCIUM SERPL-MCNC: 9.4 MG/DL — SIGNIFICANT CHANGE UP (ref 8.6–10.2)
CHLORIDE SERPL-SCNC: 101 MMOL/L — SIGNIFICANT CHANGE UP (ref 98–107)
CO2 SERPL-SCNC: 28 MMOL/L — SIGNIFICANT CHANGE UP (ref 22–29)
CREAT SERPL-MCNC: 0.86 MG/DL — SIGNIFICANT CHANGE UP (ref 0.5–1.3)
GLUCOSE SERPL-MCNC: 90 MG/DL — SIGNIFICANT CHANGE UP (ref 70–99)
HCT VFR BLD CALC: 35.6 % — SIGNIFICANT CHANGE UP (ref 34.5–45)
HGB BLD-MCNC: 11.2 G/DL — LOW (ref 11.5–15.5)
MCHC RBC-ENTMCNC: 30.9 PG — SIGNIFICANT CHANGE UP (ref 27–34)
MCHC RBC-ENTMCNC: 31.5 GM/DL — LOW (ref 32–36)
MCV RBC AUTO: 98.3 FL — SIGNIFICANT CHANGE UP (ref 80–100)
PLATELET # BLD AUTO: 202 K/UL — SIGNIFICANT CHANGE UP (ref 150–400)
POTASSIUM SERPL-MCNC: 4.1 MMOL/L — SIGNIFICANT CHANGE UP (ref 3.5–5.3)
POTASSIUM SERPL-SCNC: 4.1 MMOL/L — SIGNIFICANT CHANGE UP (ref 3.5–5.3)
PROT SERPL-MCNC: 7.1 G/DL — SIGNIFICANT CHANGE UP (ref 6.6–8.7)
RBC # BLD: 3.62 M/UL — LOW (ref 3.8–5.2)
RBC # FLD: 15.6 % — HIGH (ref 10.3–14.5)
SARS-COV-2 RNA SPEC QL NAA+PROBE: SIGNIFICANT CHANGE UP
SODIUM SERPL-SCNC: 138 MMOL/L — SIGNIFICANT CHANGE UP (ref 135–145)
WBC # BLD: 7.47 K/UL — SIGNIFICANT CHANGE UP (ref 3.8–10.5)
WBC # FLD AUTO: 7.47 K/UL — SIGNIFICANT CHANGE UP (ref 3.8–10.5)

## 2021-08-01 PROCEDURE — 72128 CT CHEST SPINE W/O DYE: CPT | Mod: 26,MG

## 2021-08-01 PROCEDURE — 72125 CT NECK SPINE W/O DYE: CPT | Mod: 26,MH

## 2021-08-01 PROCEDURE — 93010 ELECTROCARDIOGRAM REPORT: CPT | Mod: 76

## 2021-08-01 PROCEDURE — G1004: CPT

## 2021-08-01 PROCEDURE — 12002 RPR S/N/AX/GEN/TRNK2.6-7.5CM: CPT | Mod: GC

## 2021-08-01 PROCEDURE — 70450 CT HEAD/BRAIN W/O DYE: CPT | Mod: 26,MH

## 2021-08-01 PROCEDURE — 22310 CLOSED TX VERT FX W/O MANJ: CPT

## 2021-08-01 PROCEDURE — 72131 CT LUMBAR SPINE W/O DYE: CPT | Mod: 26,MG

## 2021-08-01 PROCEDURE — 72146 MRI CHEST SPINE W/O DYE: CPT | Mod: 26,MG

## 2021-08-01 PROCEDURE — 71250 CT THORAX DX C-: CPT | Mod: 26,MG

## 2021-08-01 PROCEDURE — 99291 CRITICAL CARE FIRST HOUR: CPT | Mod: 25,GC

## 2021-08-01 PROCEDURE — 73551 X-RAY EXAM OF FEMUR 1: CPT | Mod: 26,LT

## 2021-08-01 PROCEDURE — 73502 X-RAY EXAM HIP UNI 2-3 VIEWS: CPT | Mod: 26,LT

## 2021-08-01 PROCEDURE — 74176 CT ABD & PELVIS W/O CONTRAST: CPT | Mod: 26,MG

## 2021-08-01 PROCEDURE — 99222 1ST HOSP IP/OBS MODERATE 55: CPT | Mod: GC

## 2021-08-01 PROCEDURE — 99221 1ST HOSP IP/OBS SF/LOW 40: CPT | Mod: 57

## 2021-08-01 PROCEDURE — 93880 EXTRACRANIAL BILAT STUDY: CPT | Mod: 26

## 2021-08-01 RX ORDER — OLANZAPINE 15 MG/1
5 TABLET, FILM COATED ORAL AT BEDTIME
Refills: 0 | Status: DISCONTINUED | OUTPATIENT
Start: 2021-08-01 | End: 2021-08-09

## 2021-08-01 RX ORDER — MIRTAZAPINE 45 MG/1
3.25 TABLET, ORALLY DISINTEGRATING ORAL AT BEDTIME
Refills: 0 | Status: DISCONTINUED | OUTPATIENT
Start: 2021-08-01 | End: 2021-08-01

## 2021-08-01 RX ORDER — LIDOCAINE 4 G/100G
1 CREAM TOPICAL EVERY 24 HOURS
Refills: 0 | Status: DISCONTINUED | OUTPATIENT
Start: 2021-08-01 | End: 2021-08-09

## 2021-08-01 RX ORDER — ACETAMINOPHEN 500 MG
650 TABLET ORAL EVERY 6 HOURS
Refills: 0 | Status: DISCONTINUED | OUTPATIENT
Start: 2021-08-01 | End: 2021-08-09

## 2021-08-01 RX ORDER — HEPARIN SODIUM 5000 [USP'U]/ML
5000 INJECTION INTRAVENOUS; SUBCUTANEOUS EVERY 8 HOURS
Refills: 0 | Status: DISCONTINUED | OUTPATIENT
Start: 2021-08-01 | End: 2021-08-09

## 2021-08-01 RX ORDER — MIRTAZAPINE 45 MG/1
7.5 TABLET, ORALLY DISINTEGRATING ORAL DAILY
Refills: 0 | Status: DISCONTINUED | OUTPATIENT
Start: 2021-08-01 | End: 2021-08-02

## 2021-08-01 RX ORDER — VALSARTAN 80 MG/1
160 TABLET ORAL DAILY
Refills: 0 | Status: DISCONTINUED | OUTPATIENT
Start: 2021-08-01 | End: 2021-08-09

## 2021-08-01 RX ORDER — ENOXAPARIN SODIUM 100 MG/ML
30 INJECTION SUBCUTANEOUS
Refills: 0 | Status: DISCONTINUED | OUTPATIENT
Start: 2021-08-01 | End: 2021-08-01

## 2021-08-01 RX ORDER — SODIUM CHLORIDE 9 MG/ML
1000 INJECTION INTRAMUSCULAR; INTRAVENOUS; SUBCUTANEOUS ONCE
Refills: 0 | Status: COMPLETED | OUTPATIENT
Start: 2021-08-01 | End: 2021-08-01

## 2021-08-01 RX ORDER — CEFPODOXIME PROXETIL 100 MG
200 TABLET ORAL ONCE
Refills: 0 | Status: COMPLETED | OUTPATIENT
Start: 2021-08-01 | End: 2021-08-01

## 2021-08-01 RX ADMIN — OLANZAPINE 5 MILLIGRAM(S): 15 TABLET, FILM COATED ORAL at 22:00

## 2021-08-01 RX ADMIN — HEPARIN SODIUM 5000 UNIT(S): 5000 INJECTION INTRAVENOUS; SUBCUTANEOUS at 22:00

## 2021-08-01 RX ADMIN — LIDOCAINE 1 PATCH: 4 CREAM TOPICAL at 17:25

## 2021-08-01 RX ADMIN — Medication 650 MILLIGRAM(S): at 17:24

## 2021-08-01 RX ADMIN — VALSARTAN 160 MILLIGRAM(S): 80 TABLET ORAL at 22:01

## 2021-08-01 RX ADMIN — Medication 200 MILLIGRAM(S): at 15:05

## 2021-08-01 RX ADMIN — SODIUM CHLORIDE 1000 MILLILITER(S): 9 INJECTION INTRAMUSCULAR; INTRAVENOUS; SUBCUTANEOUS at 11:05

## 2021-08-01 NOTE — CONSULT NOTE ADULT - NSCONSULTADDITIONALINFOA_GEN_ALL_CORE
Attending statement:  I have personally seen this patient, and formed a face to face diagnostic evaluation on this patient on this date.  I have reviewed the PA, NP and or Medical/PA student and/or Resident documentation and agree with the history, physical exam and plan of care except if noted otherwise.     Patient seen and examined in the emergency department approximately 11:15 AM on August 1. With compression fractures MTP fractures I would recommend conservative treatment at this point in time based upon dementia believed an LSO brace may be a more functional brace for this woman MRIs are currently pending. Continue with added bed or activity as tolerated based upon pain also recommend PT OT and possible subacute rehabilitation placement. Attending statement:  I have personally seen this patient, and formed a face to face diagnostic evaluation on this patient on this date.  I have reviewed the PA, NP and or Medical/PA student and/or Resident documentation and agree with the history, physical exam and plan of care except if noted otherwise.     Patient seen and examined in the emergency department approximately 11:15 AM on August 1. With compression fractures MTP fractures I would recommend conservative treatment at this point in time based upon dementia I would not recommend CT LSO bracing or TLSO bracing. MRIs are currently pending. Continue with added bed or activity as tolerated based upon pain also recommend PT OT and possible subacute rehabilitation placement.

## 2021-08-01 NOTE — H&P ADULT - ATTENDING COMMENTS
Agree with above assessment.  The patient was seen and examined by me.  The patient is s/p fall from standing after becoming dizzy.  The patient on exam has a sutured laceration to the posterior scalp and pain in the upper to mid back.  PERRL EOMI no raccoon eyes, no pugh signs, trachea midline, chest bilateral air entry, abdomen is soft and non tender.  No pelvic tenderness, there is a small contusion to the left thigh.  The patient on imaging is found to have a T3 and T4 compression fractures.  The patient is to be admitted to the trauma service for pain control, ortho spine consultation, MRI, bedrest.  Patient to be on monitored bed and undergo syncope work up.  Case discussed with the patient's son at the bedside.

## 2021-08-01 NOTE — H&P ADULT - PROBLEM SELECTOR PLAN 1
Admit to trauma   pain control  bedrest  follow up MRI results  ortho spine evaluation for recommendations

## 2021-08-01 NOTE — ED PROVIDER NOTE - PROGRESS NOTE DETAILS
Resident WRJ: cleaned head wound, stapled x4 for closure. Pt tolerated well. Charis: pt admitted to Dr. Ruano from surg.

## 2021-08-01 NOTE — ED PROVIDER NOTE - ATTENDING CONTRIBUTION TO CARE
Charis: I performed a face to face evaluation of this patient and performed a full history and physical examination on the patient.  I agree with the resident's history, physical examination, and plan of the patient unless otherwise noted. My brief assessment is as follows: hx htn, decreased appetite on mirtazapine, on olanzipine c/o chronic dizziness associated with these meds for weeks/months per pt. Felt dizzy, fell back, hit head, c/o back/chest pain. chronic wound to leg with small discharge. no cough/f/c/sob but chest tightness and pt limited historian. with lac to posterior head, no neck ttp, midline thoracic ttp and rib ttp, small hemorrhoid with fissure to rectum, distended non tender abd, mild ttp left hip/femur region. neuro intact. lac repair, pan scan showing thoracic fractures. ortho and trauma consulted. ct showing rll bronchopneumonia without significant symptoms, will give cefpodoxime. with some anasarca/ascites likely from pt's malnutrtion/lack of appetite. reassess

## 2021-08-01 NOTE — H&P ADULT - ASSESSMENT
87 y/o F w/ PMH of HTN, MVP, dizziness presenting s/p ground level fall in shower. Found to have T3, T4 compression fxs w/ questionable mass, posterior scalp lac s/p repair, and left thigh hematoma  -Admit to trauma: Dr. Shepard, Monitored bed  -Ortho/Spine for Thoracic fx recs  -Syncopal workup  -PRN pain control with non-narcotics  -Regular diet  -Home meds  -PT/OT once final recs from ortho obtained  -DVT PPX: SCD's, lovenox  Dispo: Inpatient admission

## 2021-08-01 NOTE — H&P ADULT - HISTORY OF PRESENT ILLNESS
Consult Recieved:  08-01-21 @ 12:15  Consult Seen:  08-01-21 @ 12:30    85 y/o F w/ PMH of HTN, MVP, dizziness presenting s/p ground level fall in shower. Patient does not clearly recall the events leading up to the fall. Per son and aide at bedside, patient believes her medications are causing her dizziness, but she has been worked up in the past for the dizziness which appears to not be medication related. No changes in diet, no new recent changes in medication. Dizziness, but no prior episodes of syncope. Walks with walker at baseline.

## 2021-08-01 NOTE — ED PROVIDER NOTE - NS ED ROS FT
Review of Systems  CONSTITUTIONAL: afebrile w/no diaphoresis  SKIN: warm, dry w/ no rash or bleeding  EYES: +blurry vision during episode, resolved now  ENT: no changes in hearing, no sore throat  RESPIRATORY: no cough or SOB  CARDIAC: + chest pain, no palpitations  GI: + bloating & constipation, no nausea, vomiting, diarrhea, or blood in stool/angelina blood  GENITO-URINARY: no discharge, dysuria or hematuria,

## 2021-08-01 NOTE — ED ADULT TRIAGE NOTE - STATUS:
VITAL SIGNS: I have reviewed nursing notes and confirm.  CONSTITUTIONAL: Well-developed; well-nourished; in no acute distress.  SKIN: Skin exam is warm and dry, no acute rash.  HEAD: Normocephalic; atraumatic.  EYES: PERRL, EOM intact; conjunctiva and sclera clear.  ENT: No nasal discharge; airway clear. Throat clear.  NECK: Supple; non tender.  No lymphadenopathy.  CARD: S1, S2 normal; no murmurs, gallops, or rubs. Regular rate and rhythm.  RESP: No wheezes,  no rales or rhonchi.   ABD: Normal bowel sounds; soft; non-distended; non-tender; no hepatosplenomegaly.  EXT: Normal ROM. No clubbing, cyanosis or edema.  NEURO: Alert, confused at baseline, follows simple commands . Grossly unremarkable. No focal deficits. no facial droop, moves all extremities,    PSYCH: Cooperative, appropriate.
Applied

## 2021-08-01 NOTE — H&P ADULT - NSHPPHYSICALEXAM_GEN_ALL_CORE
Constitutional: Well-developed well nourished Female in no acute distress  HEENT: Posterior scalp lac s/p repair with 5 staples. Head is normocephalic. maxillofacial structures stable, no blood or discharge from nares or oral cavity, no pugh sign / racoon eyes, EOMI b/l, pupils 3mm round and reactive to light b/l, no active drainage or redness  Neck: trachea midline  Respiratory: Breath sounds CTA b/l respirations are unlabored, no accessory muscle use, no conversational dyspnea  Cardiovascular: Regular rate & rhythm, +S1, S1, Chest wall is non-tender to palpation, no subQ emphysema or crepitus palpated  Gastrointestinal: Abdomen soft, non-tender, non-distended, no rebound tenderness / guarding, no ecchymosis or external signs of abdominal trauma  Musculoskeletal: Left lateral proximal thigh hematoma. Non-pulsatile and non-expanding. Moving all extremities spontaneously, 5/5 motor strength of b/l Upper and lower extremities. no point tenderness or deformity noted to upper or lower extremities b/l  Pelvis: stable  Vascular: 2+ radial, femoral, and DP pulses b/l  Neurological: GCS: 15 (4/5/6). A&O x 3; no gross sensory / motor / coordination deficits  Neurospinal: Mild Tspine TTP, No C/L/S spine tenderness to palpation, no step-offs or signs of external trauma to the back

## 2021-08-01 NOTE — ED PROVIDER NOTE - CLINICAL SUMMARY MEDICAL DECISION MAKING FREE TEXT BOX
PT w/ fall secondary to episode of dizziness. Cardiac history of MVP only, no blood thinners. CT w/ fractures of T-spine, RLL bronchopneumonia. PT w/ fall secondary to episode of dizziness. Cardiac history of MVP, no blood thinners. CT w/ fractures of T-spine, RLL bronchopneumonia.

## 2021-08-01 NOTE — CONSULT NOTE ADULT - SUBJECTIVE AND OBJECTIVE BOX
Pt Name: KY LAGUERRE    MRN: [id number]      Patient seen and examined at bedside with aide. Patient states she fell in her bathroom, but is unsure how it happened. Aide who was in house states it was an unwitnessed fall. Patient denies numbness/tingling, however c/o back pain. Denies new urinary/bowel incontinence. Denies saddle paresthesia. No other orthopedic complaints.      REVIEW OF SYSTEMS      General:	denies fever/chills    Respiratory and Thorax: denies SOB/chest pain    Musculoskeletal:	 see HPI    Neurological:	denies numbness/tingling    ROS is otherwise negative.    PAST MEDICAL & SURGICAL HISTORY:  PAST MEDICAL & SURGICAL HISTORY:  MVP (mitral valve prolapse)    Dizziness        Allergies: No Known Allergies      Medications: cefpodoxime 200 milliGRAM(s) Oral once  sodium chloride 0.9% Bolus 1000 milliLiter(s) IV Bolus once      FAMILY HISTORY:  : non-contributory    Social History:     denies illicit drug use                        11.2   7.47  )-----------( 202      ( 01 Aug 2021 09:21 )             35.6     08-01    138  |  101  |  32.9<H>  ----------------------------<  90  4.1   |  28.0  |  0.86    Ca    9.4      01 Aug 2021 09:21    TPro  7.1  /  Alb  3.9  /  TBili  0.3<L>  /  DBili  x   /  AST  37<H>  /  ALT  25  /  AlkPhos  80  08-01      PHYSICAL EXAM:    Vital Signs Last 24 Hrs  T(C): 36.8 (01 Aug 2021 08:02), Max: 36.8 (01 Aug 2021 08:02)  T(F): 98.3 (01 Aug 2021 08:02), Max: 98.3 (01 Aug 2021 08:02)  HR: 76 (01 Aug 2021 08:02) (76 - 76)  BP: 170/90 (01 Aug 2021 08:02) (170/90 - 170/90)  BP(mean): --  RR: 18 (01 Aug 2021 08:02) (18 - 18)  SpO2: 97% (01 Aug 2021 08:02) (97% - 97%)  Daily     Daily     Appearance: Alert, responsive, in no acute distress.    Neurological: Sensation is grossly intact to light touch. No focal deficits or weaknesses found.    Vascular: 2+ distal pulses. Cap refill < 2 sec. No signs of venous insuffiency or stasis. No cyanosis.    Musculoskeletal:      Back: skin intact. no erythema. diffuse non specific tenderness to back  UE: SILT B/L.  strength equal and in tact B/L. 5/5 motor throughout b/l. ext warm cap refill brisk B/L  LE: SILT B/L. 4+/5 dorsi/plantarflexion and HF B/L. ext warm, cap refill brisk B/L. calf soft NT B/L.    Imaging Studies:  < from: CT Thoracic Spine Reform No Cont (08.01.21 @ 08:38) >  EXAM:  CT REFORM SPINE L                         EXAM:  CT REFORM SPINE T                          PROCEDURE DATE:  08/01/2021          INTERPRETATION:  STUDY: CT OF THE THORACIC AND LUMBAR SPINE WITHOUT CONTRAST.    CLINICAL INDICATION: Fall. Back pain    TECHNIQUE: Thin section CT images were obtained through the thoracic and lumbar spine with overlapping reconstructions. Sagittal and coronal reformats were then generated off this initial set. 3-D reconstructions of the thoracic and lumbar spine was performed on a separate workstation and reviewed.    COMPARISON: None currently available.    FINDINGS:  There is transverse fracture through the T1 vertebral body without significant loss of vertebral body height. There is no associated bonyretropulsion.    At T4 there is mild loss of vertebral body height with deformity of the anterior inferior corner which may represent an acute compression fracture. There is associated bony retropulsion measuring 4 mm.    At T3 there is mildly displaced fracture of the spinous process.    Possible acute fractures involving the spinous process is at T7 and T8.    There is maintenance of usual lumbar lordosis. There is maintenance of the usual thoracic kyphosis. There is no listhesis. There is dextroscoliosis of the lumbar spine with apex at L1-L2. There is no significant loss of intervertebral disc height throughout the thoracic spine. There is advanced multilevel loss of intervertebral disc height throughout the lumbar spine with associated multilevel vacuum phenomenon and degenerative endplate changes.    There is no significant disc bulge or herniation throughout the thoracic spine.    There are multilevel disc herniations and ligamentum flavum thickening throughout the lumbar spine which contribute to central canal narrowing to varying degrees. Notably at T12-L1 there is a prominent central disc extrusion with mild inferior migration.    The paravertebral soft tissues are unremarkable.    IMPRESSION:  THORACIC SPINE:  Mild acute compression fracture at T1. No associated retropulsion.  Mild possible acute compression fracture at T4. There is associated bony retropulsion measuring 4 mm.  Fractures of the spinous process at T3, T7 and T8  Further evaluation may be obtained with MRI of the thoracic spine.    LUMBAR SPINE:  No fracture or acute traumatic malalignment. Advanced degenerative changes as described.    --- End of Report ---      < end of copied text >      A/P:  Pt is a  86y Female with multiple T spine fx  PLAN:   D/W Dr. Galvan  f/u MRI  further planning pending MRI Pt Name: KY LAGUERRE    MRN: [id number]      Patient seen and examined at bedside with aide. Patient states she fell in her bathroom, but is unsure how it happened. Aide who was in house states it was an unwitnessed fall. Patient denies numbness/tingling, however c/o back pain. Denies new urinary/bowel incontinence. Denies saddle paresthesia. No other orthopedic complaints.      REVIEW OF SYSTEMS      General:	denies fever/chills    Respiratory and Thorax: denies SOB/chest pain    Musculoskeletal:	 see HPI    Neurological:	denies numbness/tingling    ROS is otherwise negative.    PAST MEDICAL & SURGICAL HISTORY:  PAST MEDICAL & SURGICAL HISTORY:  MVP (mitral valve prolapse)    Dizziness        Allergies: No Known Allergies      Medications: cefpodoxime 200 milliGRAM(s) Oral once  sodium chloride 0.9% Bolus 1000 milliLiter(s) IV Bolus once      FAMILY HISTORY:  : non-contributory    Social History:     denies illicit drug use                        11.2   7.47  )-----------( 202      ( 01 Aug 2021 09:21 )             35.6     08-01    138  |  101  |  32.9<H>  ----------------------------<  90  4.1   |  28.0  |  0.86    Ca    9.4      01 Aug 2021 09:21    TPro  7.1  /  Alb  3.9  /  TBili  0.3<L>  /  DBili  x   /  AST  37<H>  /  ALT  25  /  AlkPhos  80  08-01      PHYSICAL EXAM:    Vital Signs Last 24 Hrs  T(C): 36.8 (01 Aug 2021 08:02), Max: 36.8 (01 Aug 2021 08:02)  T(F): 98.3 (01 Aug 2021 08:02), Max: 98.3 (01 Aug 2021 08:02)  HR: 76 (01 Aug 2021 08:02) (76 - 76)  BP: 170/90 (01 Aug 2021 08:02) (170/90 - 170/90)  BP(mean): --  RR: 18 (01 Aug 2021 08:02) (18 - 18)  SpO2: 97% (01 Aug 2021 08:02) (97% - 97%)  Daily     Daily     Appearance: Alert, responsive, in no acute distress.    Neurological: Sensation is grossly intact to light touch. No focal deficits or weaknesses found.    Vascular: 2+ distal pulses. Cap refill < 2 sec. No signs of venous insuffiency or stasis. No cyanosis.    Musculoskeletal:      Back: skin intact. no erythema. diffuse non specific tenderness to back  UE: SILT B/L.  strength equal and in tact B/L. 5/5 motor throughout b/l. ext warm cap refill brisk B/L  LE: SILT B/L. 4+/5 dorsi/plantarflexion and HF B/L. ext warm, cap refill brisk B/L. calf soft NT B/L.    Imaging Studies:  < from: CT Thoracic Spine Reform No Cont (08.01.21 @ 08:38) >  EXAM:  CT REFORM SPINE L                         EXAM:  CT REFORM SPINE T                          PROCEDURE DATE:  08/01/2021          INTERPRETATION:  STUDY: CT OF THE THORACIC AND LUMBAR SPINE WITHOUT CONTRAST.    CLINICAL INDICATION: Fall. Back pain    TECHNIQUE: Thin section CT images were obtained through the thoracic and lumbar spine with overlapping reconstructions. Sagittal and coronal reformats were then generated off this initial set. 3-D reconstructions of the thoracic and lumbar spine was performed on a separate workstation and reviewed.    COMPARISON: None currently available.    FINDINGS:  There is transverse fracture through the T1 vertebral body without significant loss of vertebral body height. There is no associated bonyretropulsion.    At T4 there is mild loss of vertebral body height with deformity of the anterior inferior corner which may represent an acute compression fracture. There is associated bony retropulsion measuring 4 mm.    At T3 there is mildly displaced fracture of the spinous process.    Possible acute fractures involving the spinous process is at T7 and T8.    There is maintenance of usual lumbar lordosis. There is maintenance of the usual thoracic kyphosis. There is no listhesis. There is dextroscoliosis of the lumbar spine with apex at L1-L2. There is no significant loss of intervertebral disc height throughout the thoracic spine. There is advanced multilevel loss of intervertebral disc height throughout the lumbar spine with associated multilevel vacuum phenomenon and degenerative endplate changes.    There is no significant disc bulge or herniation throughout the thoracic spine.    There are multilevel disc herniations and ligamentum flavum thickening throughout the lumbar spine which contribute to central canal narrowing to varying degrees. Notably at T12-L1 there is a prominent central disc extrusion with mild inferior migration.    The paravertebral soft tissues are unremarkable.    IMPRESSION:  THORACIC SPINE:  Mild acute compression fracture at T1. No associated retropulsion.  Mild possible acute compression fracture at T4. There is associated bony retropulsion measuring 4 mm.  Fractures of the spinous process at T3, T7 and T8  Further evaluation may be obtained with MRI of the thoracic spine.    LUMBAR SPINE:  No fracture or acute traumatic malalignment. Advanced degenerative changes as described.    --- End of Report ---      < end of copied text >      A/P:  Pt is a  86y Female with multiple T spine fx  PLAN:   D/W Dr. Galvan  f/u MRI  further planning pending MRI    ADDENDUM 8/1/21 3:44 PM  MRI results reviewed by Dr. Galvan  recommend medical workup/CT with IV contrast as per primary team  ED and ACS aware Pt Name: KY LAGUERRE    MRN: [id number]      Patient seen and examined at bedside with aide. Patient states she fell in her bathroom, but is unsure how it happened. Aide who was in house states it was an unwitnessed fall. Patient denies numbness/tingling, however c/o back pain. Denies new urinary/bowel incontinence. Denies saddle paresthesia. No other orthopedic complaints.      REVIEW OF SYSTEMS      General:	denies fever/chills    Respiratory and Thorax: denies SOB/chest pain    Musculoskeletal:	 see HPI    Neurological:	denies numbness/tingling    ROS is otherwise negative.    PAST MEDICAL & SURGICAL HISTORY:  PAST MEDICAL & SURGICAL HISTORY:  MVP (mitral valve prolapse)    Dizziness        Allergies: No Known Allergies      Medications: cefpodoxime 200 milliGRAM(s) Oral once  sodium chloride 0.9% Bolus 1000 milliLiter(s) IV Bolus once      FAMILY HISTORY:  : non-contributory    Social History:     denies illicit drug use                        11.2   7.47  )-----------( 202      ( 01 Aug 2021 09:21 )             35.6     08-01    138  |  101  |  32.9<H>  ----------------------------<  90  4.1   |  28.0  |  0.86    Ca    9.4      01 Aug 2021 09:21    TPro  7.1  /  Alb  3.9  /  TBili  0.3<L>  /  DBili  x   /  AST  37<H>  /  ALT  25  /  AlkPhos  80  08-01      PHYSICAL EXAM:    Vital Signs Last 24 Hrs  T(C): 36.8 (01 Aug 2021 08:02), Max: 36.8 (01 Aug 2021 08:02)  T(F): 98.3 (01 Aug 2021 08:02), Max: 98.3 (01 Aug 2021 08:02)  HR: 76 (01 Aug 2021 08:02) (76 - 76)  BP: 170/90 (01 Aug 2021 08:02) (170/90 - 170/90)  BP(mean): --  RR: 18 (01 Aug 2021 08:02) (18 - 18)  SpO2: 97% (01 Aug 2021 08:02) (97% - 97%)  Daily     Daily     Appearance: Alert, responsive, in no acute distress.    Neurological: Sensation is grossly intact to light touch. No focal deficits or weaknesses found.    Vascular: 2+ distal pulses. Cap refill < 2 sec. No signs of venous insuffiency or stasis. No cyanosis.    Musculoskeletal:      Back: skin intact. no erythema. diffuse non specific tenderness to back  UE: SILT B/L.  strength equal and in tact B/L. 5/5 motor throughout b/l. ext warm cap refill brisk B/L  LE: SILT B/L. 4+/5 dorsi/plantarflexion and HF B/L. ext warm, cap refill brisk B/L. calf soft NT B/L.    Imaging Studies:  < from: CT Thoracic Spine Reform No Cont (08.01.21 @ 08:38) >  EXAM:  CT REFORM SPINE L                         EXAM:  CT REFORM SPINE T                          PROCEDURE DATE:  08/01/2021          INTERPRETATION:  STUDY: CT OF THE THORACIC AND LUMBAR SPINE WITHOUT CONTRAST.    CLINICAL INDICATION: Fall. Back pain    TECHNIQUE: Thin section CT images were obtained through the thoracic and lumbar spine with overlapping reconstructions. Sagittal and coronal reformats were then generated off this initial set. 3-D reconstructions of the thoracic and lumbar spine was performed on a separate workstation and reviewed.    COMPARISON: None currently available.    FINDINGS:  There is transverse fracture through the T1 vertebral body without significant loss of vertebral body height. There is no associated bonyretropulsion.    At T4 there is mild loss of vertebral body height with deformity of the anterior inferior corner which may represent an acute compression fracture. There is associated bony retropulsion measuring 4 mm.    At T3 there is mildly displaced fracture of the spinous process.    Possible acute fractures involving the spinous process is at T7 and T8.    There is maintenance of usual lumbar lordosis. There is maintenance of the usual thoracic kyphosis. There is no listhesis. There is dextroscoliosis of the lumbar spine with apex at L1-L2. There is no significant loss of intervertebral disc height throughout the thoracic spine. There is advanced multilevel loss of intervertebral disc height throughout the lumbar spine with associated multilevel vacuum phenomenon and degenerative endplate changes.    There is no significant disc bulge or herniation throughout the thoracic spine.    There are multilevel disc herniations and ligamentum flavum thickening throughout the lumbar spine which contribute to central canal narrowing to varying degrees. Notably at T12-L1 there is a prominent central disc extrusion with mild inferior migration.    The paravertebral soft tissues are unremarkable.    IMPRESSION:  THORACIC SPINE:  Mild acute compression fracture at T1. No associated retropulsion.  Mild possible acute compression fracture at T4. There is associated bony retropulsion measuring 4 mm.  Fractures of the spinous process at T3, T7 and T8  Further evaluation may be obtained with MRI of the thoracic spine.    LUMBAR SPINE:  No fracture or acute traumatic malalignment. Advanced degenerative changes as described.    --- End of Report ---      < end of copied text >      A/P:  Pt is a  86y Female with multiple T spine fx  PLAN:   D/W Dr. Galvan  f/u MRI  further planning pending MRI    ADDENDUM 8/1/21 3:44 PM  MRI results reviewed by Dr. Galvan  recommend medical workup/MR with IV contrast as per primary team  ED and ACS aware Pt Name: KY LAGUERRE    MRN: [id number]      Patient seen and examined at bedside with aide. Patient states she fell in her bathroom, but is unsure how it happened. Aide who was in house states it was an unwitnessed fall. Patient denies numbness/tingling, however c/o back pain. Denies new urinary/bowel incontinence. Denies saddle paresthesia. No other orthopedic complaints.      REVIEW OF SYSTEMS      General:	denies fever/chills    Respiratory and Thorax: denies SOB/chest pain    Musculoskeletal:	 see HPI    Neurological:	denies numbness/tingling    ROS is otherwise negative.    PAST MEDICAL & SURGICAL HISTORY:  PAST MEDICAL & SURGICAL HISTORY:  MVP (mitral valve prolapse)    Dizziness        Allergies: No Known Allergies      Medications: cefpodoxime 200 milliGRAM(s) Oral once  sodium chloride 0.9% Bolus 1000 milliLiter(s) IV Bolus once      FAMILY HISTORY:  : non-contributory    Social History:     denies illicit drug use                        11.2   7.47  )-----------( 202      ( 01 Aug 2021 09:21 )             35.6     08-01    138  |  101  |  32.9<H>  ----------------------------<  90  4.1   |  28.0  |  0.86    Ca    9.4      01 Aug 2021 09:21    TPro  7.1  /  Alb  3.9  /  TBili  0.3<L>  /  DBili  x   /  AST  37<H>  /  ALT  25  /  AlkPhos  80  08-01      PHYSICAL EXAM:    Vital Signs Last 24 Hrs  T(C): 36.8 (01 Aug 2021 08:02), Max: 36.8 (01 Aug 2021 08:02)  T(F): 98.3 (01 Aug 2021 08:02), Max: 98.3 (01 Aug 2021 08:02)  HR: 76 (01 Aug 2021 08:02) (76 - 76)  BP: 170/90 (01 Aug 2021 08:02) (170/90 - 170/90)  BP(mean): --  RR: 18 (01 Aug 2021 08:02) (18 - 18)  SpO2: 97% (01 Aug 2021 08:02) (97% - 97%)  Daily     Daily     Appearance: Alert, responsive, in no acute distress.    Neurological: Sensation is grossly intact to light touch. No focal deficits or weaknesses found.    Vascular: 2+ distal pulses. Cap refill < 2 sec. No signs of venous insuffiency or stasis. No cyanosis.    Musculoskeletal:      Back: skin intact. no erythema. diffuse non specific tenderness to back  UE: SILT B/L.  strength equal and in tact B/L. 5/5 motor throughout b/l. ext warm cap refill brisk B/L  LE: SILT B/L. 4+/5 dorsi/plantarflexion and HF B/L. ext warm, cap refill brisk B/L. calf soft NT B/L.    Imaging Studies:  < from: CT Thoracic Spine Reform No Cont (08.01.21 @ 08:38) >  EXAM:  CT REFORM SPINE L                         EXAM:  CT REFORM SPINE T                          PROCEDURE DATE:  08/01/2021          INTERPRETATION:  STUDY: CT OF THE THORACIC AND LUMBAR SPINE WITHOUT CONTRAST.    CLINICAL INDICATION: Fall. Back pain    TECHNIQUE: Thin section CT images were obtained through the thoracic and lumbar spine with overlapping reconstructions. Sagittal and coronal reformats were then generated off this initial set. 3-D reconstructions of the thoracic and lumbar spine was performed on a separate workstation and reviewed.    COMPARISON: None currently available.    FINDINGS:  There is transverse fracture through the T1 vertebral body without significant loss of vertebral body height. There is no associated bonyretropulsion.    At T4 there is mild loss of vertebral body height with deformity of the anterior inferior corner which may represent an acute compression fracture. There is associated bony retropulsion measuring 4 mm.    At T3 there is mildly displaced fracture of the spinous process.    Possible acute fractures involving the spinous process is at T7 and T8.    There is maintenance of usual lumbar lordosis. There is maintenance of the usual thoracic kyphosis. There is no listhesis. There is dextroscoliosis of the lumbar spine with apex at L1-L2. There is no significant loss of intervertebral disc height throughout the thoracic spine. There is advanced multilevel loss of intervertebral disc height throughout the lumbar spine with associated multilevel vacuum phenomenon and degenerative endplate changes.    There is no significant disc bulge or herniation throughout the thoracic spine.    There are multilevel disc herniations and ligamentum flavum thickening throughout the lumbar spine which contribute to central canal narrowing to varying degrees. Notably at T12-L1 there is a prominent central disc extrusion with mild inferior migration.    The paravertebral soft tissues are unremarkable.    IMPRESSION:  THORACIC SPINE:  Mild acute compression fracture at T1. No associated retropulsion.  Mild possible acute compression fracture at T4. There is associated bony retropulsion measuring 4 mm.  Fractures of the spinous process at T3, T7 and T8  Further evaluation may be obtained with MRI of the thoracic spine.    LUMBAR SPINE:  No fracture or acute traumatic malalignment. Advanced degenerative changes as described.    --- End of Report ---      < end of copied text >      A/P:  Pt is a  86y Female with multiple T spine fx  PLAN:   D/W Dr. Galvan  f/u MRI  further planning pending MRI    ADDENDUM 8/1/21 3:44 PM  MRI results reviewed by Dr. Galvan  recommend medical/metastatic workup per primary team  f/u spine MRI with IV contrast  ED and ACS aware

## 2021-08-01 NOTE — ED ADULT TRIAGE NOTE - CHIEF COMPLAINT QUOTE
Pt BIBA from home, lives alone with aide, c/o dizziness and fall at home, laceration noted to occiput, denies blood thinners, AOx3, bleeding controlled

## 2021-08-01 NOTE — CHART NOTE - NSCHARTNOTEFT_GEN_A_CORE
Tertiary Trauma Survey (TTS)    Date of TTS: 08-01-21 @ 21:32                             Admit Date: 08-01-21 @ 14:10      Trauma Activation:      Subjective / 24 hour events: No acute events since injury and initial assessment.      Vital Signs Last 24 Hrs  T(C): 36.6 (01 Aug 2021 19:39), Max: 36.8 (01 Aug 2021 08:02)  T(F): 97.9 (01 Aug 2021 19:39), Max: 98.3 (01 Aug 2021 08:02)  HR: 60 (01 Aug 2021 19:39) (60 - 76)  BP: 123/60 (01 Aug 2021 19:39) (123/60 - 178/86)  BP(mean): --  RR: 18 (01 Aug 2021 19:39) (18 - 18)  SpO2: 97% (01 Aug 2021 19:39) (96% - 97%)    Physical Exam:    Neuro: [ x] non focal neurological exam [ ] Focal Neurological deficits noted to be:     HEENT: [ ] Normo-cephalic/atraumatic  [ x] abnormalities noted to be:  L Posterior Scalp laceration     Pulm/Chest:  [ x] CTA b/l  [ ] chest wall non tender  [ ] abnormalities noted to be:    Cardiac: [ x] S1S2, sinus rhythm  [ ] abnormalities noted to be:     GI / Abdomen: [ x] Soft, non-tender, non-distended [ ] abnormalities noted to be:    Musculoskeletal / Extremities: [ ]normal active ROM  [ x ]  abnormalities noted to be:  L Thigh Hematoma    Integumentary: [x ] Skin intact [ ] Warm [ ] Dry [ ]abnormalities noted to be:    Vascular: [x ] 2+ palpable distal pulses  [ ] PARRIS:       [ ] abnormalities noted to be:    List Injuries Identified to Date:  T3 and T4 Compression Fractures  Spinous process fractures of T3, T7, T8  L Thigh Hematoma, non expanding    List Operative and Interventional Radiological Procedures:     Consults (Date):  [  ] Neurosurgery   [ x ] Orthopedics  [  ] Plastics  [  ] Urology  [  ] PM&R  [  ] Social Work    RADIOLOGICAL FINDINGS REVIEW:  CXR:    Pelvis Films:     C-Spine Films:    T/L/S Spine Films:   Age-indeterminate T3 and T4 vertebral compression fractures, associated with masslike lesion in the ventral epidural space spanning T3-T5 resulting in severe spinal canal stenosis and mild spinal cord compression, which may represent epidural hematoma or metastasis. Further workup with contrast-enhanced MRI thoracic spine and emergent surgical consultation is recommended.    Extremity Films:    Head CT:    Mild parietal scalp swelling near the vertex. There is no acute intracranial hemorrhage or depressed calvarial fracture. Chronic findings as above.    C-Spine CT:  Mild acute compression fracture at T1. No associated retropulsion.  Mild possible acute compression fracture at T4. There is associated bony retropulsion measuring 4 mm.  Mildly displaced fracture of the spinous process at T3.  Further evaluation may be obtained with MRI of the thoracic spine.  Multilevel degenerative changes as described.    Neck CT:    Chest CT:    ABD/Pelvis CT:  1. RLL bronchopneumonia.  2. Abdominal ascites /anasarca.  3. 5 mm superior LLL nodule favors a benign etiology (3/82). Correlation with comorbidities may determine the need for follow-up CT in one year.  4. T3, T4 wedge deformities are age indeterminant..  5. Multiple healed rib fractures. No displaced/acute rib fracture  6. Marked paucity of subcutaneous /intra-abdominal adipose and lack of IV/oral contrast significantly limits diagnostic sensitivity.    Other:

## 2021-08-01 NOTE — ED PROVIDER NOTE - OBJECTIVE STATEMENT
AURE is a 87yo female complaining of fall after feeling dizzy this morning (~6am). She states she got up to go to the bathroom but just as she got to the bathroom she started to feel dizzy; she describes the room spinning and her vision going dark. She was found yelling for help by her aide minutes later. She hit her head and back when she fell. PMH sig for MVP, no other cardiac history on pt report. She believes her dizziness is due to her psych medicines. Now with headache, chest pain and back pain. Denies N/V.

## 2021-08-02 LAB
ANION GAP SERPL CALC-SCNC: 10 MMOL/L — SIGNIFICANT CHANGE UP (ref 5–17)
BUN SERPL-MCNC: 24 MG/DL — HIGH (ref 8–20)
CALCIUM SERPL-MCNC: 9.2 MG/DL — SIGNIFICANT CHANGE UP (ref 8.6–10.2)
CHLORIDE SERPL-SCNC: 102 MMOL/L — SIGNIFICANT CHANGE UP (ref 98–107)
CO2 SERPL-SCNC: 26 MMOL/L — SIGNIFICANT CHANGE UP (ref 22–29)
COVID-19 SPIKE DOMAIN AB INTERP: POSITIVE
COVID-19 SPIKE DOMAIN ANTIBODY RESULT: 55.5 U/ML — HIGH
CREAT SERPL-MCNC: 0.67 MG/DL — SIGNIFICANT CHANGE UP (ref 0.5–1.3)
GLUCOSE SERPL-MCNC: 104 MG/DL — HIGH (ref 70–99)
HCT VFR BLD CALC: 31.7 % — LOW (ref 34.5–45)
HGB BLD-MCNC: 10.2 G/DL — LOW (ref 11.5–15.5)
MAGNESIUM SERPL-MCNC: 2.1 MG/DL — SIGNIFICANT CHANGE UP (ref 1.6–2.6)
MCHC RBC-ENTMCNC: 31.2 PG — SIGNIFICANT CHANGE UP (ref 27–34)
MCHC RBC-ENTMCNC: 32.2 GM/DL — SIGNIFICANT CHANGE UP (ref 32–36)
MCV RBC AUTO: 96.9 FL — SIGNIFICANT CHANGE UP (ref 80–100)
PHOSPHATE SERPL-MCNC: 2.6 MG/DL — SIGNIFICANT CHANGE UP (ref 2.4–4.7)
PLATELET # BLD AUTO: 180 K/UL — SIGNIFICANT CHANGE UP (ref 150–400)
POTASSIUM SERPL-MCNC: 4 MMOL/L — SIGNIFICANT CHANGE UP (ref 3.5–5.3)
POTASSIUM SERPL-SCNC: 4 MMOL/L — SIGNIFICANT CHANGE UP (ref 3.5–5.3)
RBC # BLD: 3.27 M/UL — LOW (ref 3.8–5.2)
RBC # FLD: 15.8 % — HIGH (ref 10.3–14.5)
SARS-COV-2 IGG+IGM SERPL QL IA: 55.5 U/ML — HIGH
SARS-COV-2 IGG+IGM SERPL QL IA: POSITIVE
SODIUM SERPL-SCNC: 138 MMOL/L — SIGNIFICANT CHANGE UP (ref 135–145)
WBC # BLD: 6.29 K/UL — SIGNIFICANT CHANGE UP (ref 3.8–10.5)
WBC # FLD AUTO: 6.29 K/UL — SIGNIFICANT CHANGE UP (ref 3.8–10.5)

## 2021-08-02 PROCEDURE — 93306 TTE W/DOPPLER COMPLETE: CPT | Mod: 26

## 2021-08-02 PROCEDURE — 99232 SBSQ HOSP IP/OBS MODERATE 35: CPT | Mod: 24

## 2021-08-02 RX ORDER — MIRTAZAPINE 45 MG/1
7.5 TABLET, ORALLY DISINTEGRATING ORAL AT BEDTIME
Refills: 0 | Status: DISCONTINUED | OUTPATIENT
Start: 2021-08-02 | End: 2021-08-05

## 2021-08-02 RX ORDER — LINACLOTIDE 145 UG/1
1 CAPSULE, GELATIN COATED ORAL
Qty: 0 | Refills: 0 | DISCHARGE

## 2021-08-02 RX ORDER — POLYETHYLENE GLYCOL 3350 17 G/17G
17 POWDER, FOR SOLUTION ORAL DAILY
Refills: 0 | Status: DISCONTINUED | OUTPATIENT
Start: 2021-08-02 | End: 2021-08-09

## 2021-08-02 RX ADMIN — LIDOCAINE 1 PATCH: 4 CREAM TOPICAL at 16:22

## 2021-08-02 RX ADMIN — LIDOCAINE 1 PATCH: 4 CREAM TOPICAL at 21:30

## 2021-08-02 RX ADMIN — HEPARIN SODIUM 5000 UNIT(S): 5000 INJECTION INTRAVENOUS; SUBCUTANEOUS at 21:46

## 2021-08-02 RX ADMIN — HEPARIN SODIUM 5000 UNIT(S): 5000 INJECTION INTRAVENOUS; SUBCUTANEOUS at 13:44

## 2021-08-02 RX ADMIN — Medication 650 MILLIGRAM(S): at 05:32

## 2021-08-02 RX ADMIN — OLANZAPINE 5 MILLIGRAM(S): 15 TABLET, FILM COATED ORAL at 21:42

## 2021-08-02 RX ADMIN — Medication 650 MILLIGRAM(S): at 11:07

## 2021-08-02 RX ADMIN — Medication 650 MILLIGRAM(S): at 23:00

## 2021-08-02 RX ADMIN — Medication 650 MILLIGRAM(S): at 17:05

## 2021-08-02 RX ADMIN — VALSARTAN 160 MILLIGRAM(S): 80 TABLET ORAL at 05:32

## 2021-08-02 RX ADMIN — Medication 650 MILLIGRAM(S): at 12:27

## 2021-08-02 RX ADMIN — Medication 650 MILLIGRAM(S): at 22:11

## 2021-08-02 RX ADMIN — HEPARIN SODIUM 5000 UNIT(S): 5000 INJECTION INTRAVENOUS; SUBCUTANEOUS at 05:32

## 2021-08-02 NOTE — PROGRESS NOTE ADULT - SUBJECTIVE AND OBJECTIVE BOX
INTERVAL HPI/OVERNIGHT EVENTS:    Patient evaluated at bedside. No acute distress. No acute events overnight. Syncope work up still pending.      MEDICATIONS  (STANDING):  acetaminophen    Suspension .. 650 milliGRAM(s) Oral every 6 hours  heparin   Injectable 5000 Unit(s) SubCutaneous every 8 hours  lidocaine   4% Patch 1 Patch Transdermal every 24 hours  mirtazapine 7.5 milliGRAM(s) Oral daily  OLANZapine 5 milliGRAM(s) Oral at bedtime  valsartan 160 milliGRAM(s) Oral daily    MEDICATIONS  (PRN):      Vital Signs Last 24 Hrs  T(C): 36.5 (01 Aug 2021 23:29), Max: 36.8 (01 Aug 2021 08:02)  T(F): 97.7 (01 Aug 2021 23:29), Max: 98.3 (01 Aug 2021 08:02)  HR: 72 (01 Aug 2021 23:29) (60 - 76)  BP: 147/78 (01 Aug 2021 23:29) (123/60 - 178/86)  BP(mean): --  RR: 18 (01 Aug 2021 23:29) (17 - 18)  SpO2: 96% (01 Aug 2021 23:29) (96% - 97%)    Physical Exam: Constitutional: Well-developed well nourished Female in no acute distress  HEENT: Posterior scalp lac s/p repair with 5 staples. Head is normocephalic. maxillofacial structures stable, no blood or discharge from nares or oral cavity, no pugh sign / racoon eyes, EOMI b/l, pupils 3mm round and reactive to light b/l, no active drainage or redness  Neck: trachea midline  Respiratory: Breath sounds CTA b/l respirations are unlabored, no accessory muscle use, no conversational dyspnea  Cardiovascular: Regular rate & rhythm, +S1, S1, Chest wall is non-tender to palpation, no subQ emphysema or crepitus palpated  Gastrointestinal: Abdomen soft, non-tender, non-distended, no rebound tenderness / guarding, no ecchymosis or external signs of abdominal trauma  Musculoskeletal: Left lateral proximal thigh hematoma. Non-pulsatile and non-expanding. Moving all extremities spontaneously, 5/5 motor strength of b/l Upper and lower extremities. no point tenderness or deformity noted to upper or lower extremities b/l  Pelvis: stable  Vascular: 2+ radial, femoral, and DP pulses b/l  Neurological: GCS: 15 (4/5/6). A&O x 3; no gross sensory / motor / coordination deficits  Neurospinal: Mild Tspine TTP, No C/L/S spine tenderness to palpation, no step-offs or signs of external trauma to the back      I&O's Detail      LABS:                        11.2   7.47  )-----------( 202      ( 01 Aug 2021 09:21 )             35.6     08-01    138  |  101  |  32.9<H>  ----------------------------<  90  4.1   |  28.0  |  0.86    Ca    9.4      01 Aug 2021 09:21    TPro  7.1  /  Alb  3.9  /  TBili  0.3<L>  /  DBili  x   /  AST  37<H>  /  ALT  25  /  AlkPhos  80  08-01          RADIOLOGY & ADDITIONAL STUDIES: INTERVAL HPI/OVERNIGHT EVENTS:    Patient evaluated at bedside. No acute distress. No acute events overnight. Syncope work up still pending.      MEDICATIONS  (STANDING):  acetaminophen    Suspension .. 650 milliGRAM(s) Oral every 6 hours  heparin   Injectable 5000 Unit(s) SubCutaneous every 8 hours  lidocaine   4% Patch 1 Patch Transdermal every 24 hours  mirtazapine 7.5 milliGRAM(s) Oral daily  OLANZapine 5 milliGRAM(s) Oral at bedtime  valsartan 160 milliGRAM(s) Oral daily    MEDICATIONS  (PRN):      Vital Signs Last 24 Hrs  T(C): 36.5 (01 Aug 2021 23:29), Max: 36.8 (01 Aug 2021 08:02)  T(F): 97.7 (01 Aug 2021 23:29), Max: 98.3 (01 Aug 2021 08:02)  HR: 72 (01 Aug 2021 23:29) (60 - 76)  BP: 147/78 (01 Aug 2021 23:29) (123/60 - 178/86)  BP(mean): --  RR: 18 (01 Aug 2021 23:29) (17 - 18)  SpO2: 96% (01 Aug 2021 23:29) (96% - 97%)    Physical exam:   constitutional :Lying in bed in NAD, awake and alert  Respiratory: no distress, no dyspnea, no supplemental o2 needed   Back: skin intact. no erythema. diffuse non specific tenderness to back  extremities: B/L upper and lower extremities nvi, ROM of motion wnl, bedrest for now    I&O's Detail      LABS:                        11.2   7.47  )-----------( 202      ( 01 Aug 2021 09:21 )             35.6     08-01    138  |  101  |  32.9<H>  ----------------------------<  90  4.1   |  28.0  |  0.86    Ca    9.4      01 Aug 2021 09:21    TPro  7.1  /  Alb  3.9  /  TBili  0.3<L>  /  DBili  x   /  AST  37<H>  /  ALT  25  /  AlkPhos  80  08-01          RADIOLOGY & ADDITIONAL STUDIES:

## 2021-08-02 NOTE — PROGRESS NOTE ADULT - ASSESSMENT
85 y/o F w/ PMH of HTN, MVP, dizziness presenting s/p ground level fall in shower. Found to have T3, T4 compression fxs w/ questionable mass, posterior scalp lac s/p repair, and left thigh hematoma    -Ortho/Spine for Thoracic fx recs  -Syncopal workup  -PRN pain control with non-narcotics  -Regular diet  -Home meds  -PT/OT once final recs from ortho obtained  -DVT PPX: SCD's, lovenox  Dispo: Inpatient admission

## 2021-08-02 NOTE — PROGRESS NOTE ADULT - NSPROGADDITIONALINFOA_GEN_ALL_CORE
Attending statement:  I have personally seen this patient, and formed a face to face diagnostic evaluation on this patient on this date.  I have reviewed the PA, NP and or Medical/PA student and/or Resident documentation and agree with the history, physical exam and plan of care except if noted otherwise.     Patient is seen in the emergency department in bed 12 on the right in the morning of 82 2021. We discussed underlying T1 compression fracture and suspected metastases we've discussed MRI imaging with contrast. Patient does not wish for further diagnostic such as contrasted studies. Patient is also not mentioned surgical management which I did think is reasonable given her age and her metabolic status is cachexia. Consider palliative care radiation therapy and a workup such as chest and pelvis CAT scan to see if there is soft tissue/olid organ involvement / primary tumor

## 2021-08-02 NOTE — PROGRESS NOTE ADULT - SUBJECTIVE AND OBJECTIVE BOX
[facility]    [id number]    History:  The patient is followed for multiple T spine fxs s/p fall. Complains of back pain. Taking Tylenol for pain with some relief but does not want to take any other pain meds. No new complaints. No acute motor or sensory changes are reported.    Vital Signs Last 24 Hrs  T(C): 36.5 (01 Aug 2021 23:29), Max: 36.7 (01 Aug 2021 16:14)  T(F): 97.7 (01 Aug 2021 23:29), Max: 98.1 (01 Aug 2021 16:14)  HR: 70 (02 Aug 2021 05:26) (60 - 74)  BP: 159/76 (02 Aug 2021 05:26) (123/60 - 178/86)  BP(mean): --  RR: 17 (02 Aug 2021 05:26) (17 - 18)  SpO2: 99% (02 Aug 2021 05:26) (96% - 99%)  I&O's Summary                            10.2   6.29  )-----------( 180      ( 02 Aug 2021 07:48 )             31.7     08-01    138  |  101  |  32.9<H>  ----------------------------<  90  4.1   |  28.0  |  0.86    Ca    9.4      01 Aug 2021 09:21    TPro  7.1  /  Alb  3.9  /  TBili  0.3<L>  /  DBili  x   /  AST  37<H>  /  ALT  25  /  AlkPhos  80  08-01      MEDICATIONS  (STANDING):  acetaminophen    Suspension .. 650 milliGRAM(s) Oral every 6 hours  heparin   Injectable 5000 Unit(s) SubCutaneous every 8 hours  lidocaine   4% Patch 1 Patch Transdermal every 24 hours  mirtazapine 7.5 milliGRAM(s) Oral daily  OLANZapine 5 milliGRAM(s) Oral at bedtime  polyethylene glycol 3350 17 Gram(s) Oral daily  valsartan 160 milliGRAM(s) Oral daily    MEDICATIONS  (PRN):      Physical exam: Lying in bed in NAD, awake and alert  Back: skin intact. no erythema. diffuse non specific tenderness to back  UE: SILT B/L.  strength equal and in tact B/L. 5/5 motor throughout b/l. ext warm cap refill brisk B/L  LE: SILT B/L. 4+/5 dorsi/plantarflexion and HF B/L. ext warm, cap refill brisk B/L. calf soft NT B/L.    Primary Orthopedic Assessment:  • 87 yo female with multiple T spine fxs    Plan:   • DVT prophylaxis as prescribed, including use of compression devices and ankle pumps  - MRI C,T,L spine with contrast pending  - PT/OT as tolerated  - recommend medical/metastatic workup per primary team  - pain control  - Dispo planning likely LOR

## 2021-08-02 NOTE — PROGRESS NOTE ADULT - ATTENDING COMMENTS
Patient awake alert, slightly confused. The are in the spinal canal is likely epidural tiny bleed in face of hx. Syncopal workup.  Patient is an extremely poor surgical candidate. Needs TLSO brace and rehab.

## 2021-08-03 PROCEDURE — 72149 MRI LUMBAR SPINE W/DYE: CPT | Mod: 26

## 2021-08-03 PROCEDURE — 99232 SBSQ HOSP IP/OBS MODERATE 35: CPT | Mod: 24

## 2021-08-03 PROCEDURE — 72147 MRI CHEST SPINE W/DYE: CPT | Mod: 26

## 2021-08-03 PROCEDURE — 99232 SBSQ HOSP IP/OBS MODERATE 35: CPT

## 2021-08-03 PROCEDURE — 72142 MRI NECK SPINE W/DYE: CPT | Mod: 26

## 2021-08-03 RX ORDER — ALPRAZOLAM 0.25 MG
0.25 TABLET ORAL ONCE
Refills: 0 | Status: DISCONTINUED | OUTPATIENT
Start: 2021-08-03 | End: 2021-08-03

## 2021-08-03 RX ORDER — SODIUM CHLORIDE 9 MG/ML
1000 INJECTION, SOLUTION INTRAVENOUS
Refills: 0 | Status: DISCONTINUED | OUTPATIENT
Start: 2021-08-03 | End: 2021-08-07

## 2021-08-03 RX ADMIN — HEPARIN SODIUM 5000 UNIT(S): 5000 INJECTION INTRAVENOUS; SUBCUTANEOUS at 11:42

## 2021-08-03 RX ADMIN — HEPARIN SODIUM 5000 UNIT(S): 5000 INJECTION INTRAVENOUS; SUBCUTANEOUS at 04:50

## 2021-08-03 RX ADMIN — LIDOCAINE 1 PATCH: 4 CREAM TOPICAL at 06:05

## 2021-08-03 RX ADMIN — Medication 650 MILLIGRAM(S): at 04:50

## 2021-08-03 RX ADMIN — VALSARTAN 160 MILLIGRAM(S): 80 TABLET ORAL at 04:50

## 2021-08-03 RX ADMIN — Medication 0.25 MILLIGRAM(S): at 21:01

## 2021-08-03 RX ADMIN — Medication 650 MILLIGRAM(S): at 05:30

## 2021-08-03 NOTE — ADVANCED PRACTICE NURSE CONSULT - RECOMMEDATIONS
·	Sacrum - Cavilon Skin Barrier - Daily  ·	Right Heel - keep offloaded with Cair boot or pillows  ·	Left Shin - cleanse with normal saline, Cavilon Advanced Q3days  ·	Turn and Reposition Q2H while in bed  ·	Offload patient's back to alternate side when repositioning with wedge/Z-maegan pillow

## 2021-08-03 NOTE — PROGRESS NOTE ADULT - SUBJECTIVE AND OBJECTIVE BOX
SUBJECTIVE/24 hour events: The patient is 87 y/o here after fall sustaining multiple T spine fxs. No acute events overnight Complains of back pain. Taking Tylenol for pain with some relief but does not want to take any other pain meds. No new complaints. No acute motor or sensory changes are reported.    Vital Signs Last 24 Hrs  T(C): 36.5 (03 Aug 2021 00:09), Max: 36.5 (02 Aug 2021 19:13)  T(F): 97.7 (03 Aug 2021 00:09), Max: 97.7 (02 Aug 2021 19:13)  HR: 72 (03 Aug 2021 00:09) (59 - 80)  BP: 163/83 (03 Aug 2021 00:09) (121/54 - 172/81)  BP(mean): --  RR: 18 (03 Aug 2021 00:09) (16 - 18)  SpO2: 97% (03 Aug 2021 00:09) (96% - 99%)  Drug Dosing Weight  Height (cm): 152.4 (08 Jun 2021 17:37)  Weight (kg): 40 (01 Aug 2021 16:14)  BMI (kg/m2): 17.2 (01 Aug 2021 16:14)  BSA (m2): 1.32 (01 Aug 2021 16:14)  I&O's Detail    Allergies    Gantrisin (Unknown)  penicillins (Unknown)    Intolerances    sulfa drugs (Unknown)                            10.2   6.29  )-----------( 180      ( 02 Aug 2021 07:48 )             31.7   08-02    138  |  102  |  24.0<H>  ----------------------------<  104<H>  4.0   |  26.0  |  0.67    Ca    9.2      02 Aug 2021 07:48  Phos  2.6     08-02  Mg     2.1     08-02    TPro  7.1  /  Alb  3.9  /  TBili  0.3<L>  /  DBili  x   /  AST  37<H>  /  ALT  25  /  AlkPhos  80  08-01      ROS:    PHYSICAL EXAM:  Constitutional: resting comfortably     Respiratory: no respiratory distress, no dyspnea, no supplemental o2 needed    Gastrointestinal: abdomen soft, non-tender, atraumatic   Genitourinary:  Rectal:  Extremities:  Vascular:  Neurological:  Skin:  Musculoskeletal:  Psychiatric:        MEDICATIONS  (STANDING):  acetaminophen    Suspension .. 650 milliGRAM(s) Oral every 6 hours  heparin   Injectable 5000 Unit(s) SubCutaneous every 8 hours  lidocaine   4% Patch 1 Patch Transdermal every 24 hours  mirtazapine 7.5 milliGRAM(s) Oral at bedtime  OLANZapine 5 milliGRAM(s) Oral at bedtime  polyethylene glycol 3350 17 Gram(s) Oral daily  valsartan 160 milliGRAM(s) Oral daily    MEDICATIONS  (PRN):      RADIOLOGY STUDIES:    CULTURES:

## 2021-08-03 NOTE — ADVANCED PRACTICE NURSE CONSULT - ASSESSMENT
Patient is A&Ox3, with documented history of urinary/fecal incontinence.  Current Samir score of 14.  Noted ecchymosis on all extremities with hematoma to Left Hip from recent fall.      Right Heel - healed Stage II PI with intact yellow dry scab (0.2cm x0.2cm) with pink, blanching flaky periwound skin, no drainage noted    ·	Left Anterior Shin Ulceration/Erosion likely from prior trauma.  2 sites notes with pink intact periwound area surrounding them.  ·	Site 1: (1cm x 0.5) pink, white, moist, with minimal serosanguinous drainage noted,   ·	Site 2: (0.5cm x 0.2cm) white, moist with minimal sersanguinous drainage     Sacrum - DTI (02cm x 0.2cm) intact persistent, nonblanching purple discoloration with intact periwound skin, no drainage  Patient is A&Ox3, with documented history of urinary/fecal incontinence.  Current Samir score of 14.  Noted ecchymosis on all extremities with documented hematoma to Left Hip.      ·	Right Heel - HEALED Stage II PI with intact yellow dry scab (0.2cm x0.2cm) with pink, blanching flaky periwound skin, no drainage noted    ·	Left Anterior Shin Ulceration/Erosion likely from prior trauma.  2 sites noted with pink, fragile, intact periwound area surrounding them.  ·	Site 1 Medial aspect: (1cm x 0.5) pink, white, moist, with minimal serosanguinous drainage   ·	Site 2 Lateral aspect: (0.5cm x 0.2cm) white, moist with minimal serosanguinous drainage     ·	Sacrum - DTI (02cm x 0.2cm) intact, persistent, nonblanching purple discoloration with intact periwound skin, no drainage

## 2021-08-03 NOTE — PROGRESS NOTE ADULT - NSPROGADDITIONALINFOA_GEN_ALL_CORE
Attending statement:  I have personally seen this patient, and formed a face to face diagnostic evaluation on this patient on this date.  I have reviewed the PA, NP and or Medical/PA student and/or Resident documentation and agree with the history, physical exam and plan of care except if noted otherwise.     Had a lengthy conversation with this patient at approximately 7 AM. I discussed her MRI and CAT scan findings of upper thoracic/T1 compression fracture with a high suspicion for metastatic disease. I recommended MRI imaging with contrast to better delineate the etiology. Patient clearly states she is not one contrasted studies nor changed in surgical management. 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 Having a multilevel cervical thoracic fusion to debulk this tumor has a high risk of complications such as wound dehiscence neurologic deficits etc. Consideration of a palliative care consult radiation therapy may also be reasonable.

## 2021-08-03 NOTE — PROGRESS NOTE ADULT - ATTENDING COMMENTS
87 yo F with PMH of HTN, MVP, and dizziness is s/p fall in shower and was found to have T3, T4 compression fx w/ questionable spine mass, SP fx T3, T7, T8, posterior scalp lac s/p repair, and left thigh hematoma  AAOx3, NAD, tolerating diet.   PUL CTA  CV RRR  GI Benign  Plan  1. Spine surgery recommend MRI of thoracic spine to eval for metastatic disease  2. Analgesia for pain control with non-narcotics  3. Continued Regular diet  4. PT/OT once MRI r/o metstatic disease  5. Continue DVT ppx      code [id number]

## 2021-08-03 NOTE — PROGRESS NOTE ADULT - ASSESSMENT
87 y/o F w/ PMH of HTN, MVP, dizziness presenting s/p ground level fall in shower. Found to have T3, T4 compression fxs w/ questionable mass, posterior scalp lac s/p repair, and left thigh hematoma    -Ortho-spine following, MRI needed of thoracic spine   -Syncopal workup  -PRN pain control with non-narcotics  -Regular diet  -Home meds  -PT/OT once final recs from ortho obtained  -DVT PPX: SCD's, lovenox

## 2021-08-03 NOTE — PROGRESS NOTE ADULT - SUBJECTIVE AND OBJECTIVE BOX
[facility]  [id number]    History:  The patient is followed for multiple T spine fxs s/p fall. Complains of back pain. Taking Tylenol for pain with some relief but does not want to take any other pain meds. No new complaints. No acute motor or sensory changes are reported.    Vital Signs Last 24 Hrs  T(C): 36.1 (03 Aug 2021 05:38), Max: 36.5 (02 Aug 2021 19:13)  T(F): 97 (03 Aug 2021 05:38), Max: 97.7 (02 Aug 2021 19:13)  HR: 75 (03 Aug 2021 05:38) (59 - 80)  BP: 174/90 (03 Aug 2021 05:38) (121/54 - 174/90)  BP(mean): --  RR: 18 (03 Aug 2021 05:38) (16 - 18)  SpO2: 96% (03 Aug 2021 05:38) (96% - 99%)                                   10.2   6.29  )-----------( 180      ( 02 Aug 2021 07:48 )             31.7   08-02    138  |  102  |  24.0<H>  ----------------------------<  104<H>  4.0   |  26.0  |  0.67    Ca    9.2      02 Aug 2021 07:48  Phos  2.6     08-02  Mg     2.1     08-02    TPro  7.1  /  Alb  3.9  /  TBili  0.3<L>  /  DBili  x   /  AST  37<H>  /  ALT  25  /  AlkPhos  80  08-01        Physical exam: Lying in bed in NAD, awake and alert  Back: skin intact. no erythema. diffuse non specific tenderness to back  UE: SILT B/L.  strength equal and in tact B/L. 5/5 motor throughout b/l. ext warm cap refill brisk B/L  LE: SILT B/L. 4+/5 dorsi/plantarflexion and HF B/L. ext warm, cap refill brisk B/L. calf soft NT B/L.    Primary Orthopedic Assessment:  • 85 yo female with multiple T spine fxs    Plan:   • DVT prophylaxis as prescribed, including use of compression devices and ankle pumps  - MRI C,T,L spine with contrast pending  - PT/OT as tolerated  - recommend medical/metastatic workup per primary team  - pain control  - Dispo planning likely LOR

## 2021-08-04 LAB
BASOPHILS # BLD AUTO: 0.04 K/UL — SIGNIFICANT CHANGE UP (ref 0–0.2)
BASOPHILS NFR BLD AUTO: 1.1 % — SIGNIFICANT CHANGE UP (ref 0–2)
EOSINOPHIL # BLD AUTO: 0.02 K/UL — SIGNIFICANT CHANGE UP (ref 0–0.5)
EOSINOPHIL NFR BLD AUTO: 0.5 % — SIGNIFICANT CHANGE UP (ref 0–6)
HCT VFR BLD CALC: 34.8 % — SIGNIFICANT CHANGE UP (ref 34.5–45)
HGB BLD-MCNC: 11 G/DL — LOW (ref 11.5–15.5)
IMM GRANULOCYTES NFR BLD AUTO: 3.9 % — HIGH (ref 0–1.5)
LYMPHOCYTES # BLD AUTO: 0.77 K/UL — LOW (ref 1–3.3)
LYMPHOCYTES # BLD AUTO: 20.3 % — SIGNIFICANT CHANGE UP (ref 13–44)
MCHC RBC-ENTMCNC: 30.9 PG — SIGNIFICANT CHANGE UP (ref 27–34)
MCHC RBC-ENTMCNC: 31.6 GM/DL — LOW (ref 32–36)
MCV RBC AUTO: 97.8 FL — SIGNIFICANT CHANGE UP (ref 80–100)
MONOCYTES # BLD AUTO: 0.38 K/UL — SIGNIFICANT CHANGE UP (ref 0–0.9)
MONOCYTES NFR BLD AUTO: 10 % — SIGNIFICANT CHANGE UP (ref 2–14)
NEUTROPHILS # BLD AUTO: 2.44 K/UL — SIGNIFICANT CHANGE UP (ref 1.8–7.4)
NEUTROPHILS NFR BLD AUTO: 64.2 % — SIGNIFICANT CHANGE UP (ref 43–77)
PLATELET # BLD AUTO: 158 K/UL — SIGNIFICANT CHANGE UP (ref 150–400)
RBC # BLD: 3.56 M/UL — LOW (ref 3.8–5.2)
RBC # FLD: 15.8 % — HIGH (ref 10.3–14.5)
WBC # BLD: 3.73 K/UL — LOW (ref 3.8–10.5)
WBC # FLD AUTO: 3.73 K/UL — LOW (ref 3.8–10.5)

## 2021-08-04 PROCEDURE — 99232 SBSQ HOSP IP/OBS MODERATE 35: CPT

## 2021-08-04 PROCEDURE — 99232 SBSQ HOSP IP/OBS MODERATE 35: CPT | Mod: 24

## 2021-08-04 RX ORDER — LACTULOSE 10 G/15ML
30 SOLUTION ORAL ONCE
Refills: 0 | Status: COMPLETED | OUTPATIENT
Start: 2021-08-04 | End: 2021-08-04

## 2021-08-04 RX ORDER — SENNA PLUS 8.6 MG/1
2 TABLET ORAL AT BEDTIME
Refills: 0 | Status: DISCONTINUED | OUTPATIENT
Start: 2021-08-04 | End: 2021-08-09

## 2021-08-04 RX ORDER — ONDANSETRON 8 MG/1
4 TABLET, FILM COATED ORAL ONCE
Refills: 0 | Status: COMPLETED | OUTPATIENT
Start: 2021-08-04 | End: 2021-08-04

## 2021-08-04 RX ORDER — HYDRALAZINE HCL 50 MG
10 TABLET ORAL ONCE
Refills: 0 | Status: COMPLETED | OUTPATIENT
Start: 2021-08-04 | End: 2021-08-04

## 2021-08-04 RX ORDER — LANOLIN ALCOHOL/MO/W.PET/CERES
3 CREAM (GRAM) TOPICAL AT BEDTIME
Refills: 0 | Status: DISCONTINUED | OUTPATIENT
Start: 2021-08-04 | End: 2021-08-09

## 2021-08-04 RX ADMIN — Medication 10 MILLIGRAM(S): at 22:16

## 2021-08-04 RX ADMIN — HEPARIN SODIUM 5000 UNIT(S): 5000 INJECTION INTRAVENOUS; SUBCUTANEOUS at 22:16

## 2021-08-04 RX ADMIN — HEPARIN SODIUM 5000 UNIT(S): 5000 INJECTION INTRAVENOUS; SUBCUTANEOUS at 05:37

## 2021-08-04 RX ADMIN — Medication 3 MILLIGRAM(S): at 22:16

## 2021-08-04 RX ADMIN — Medication 650 MILLIGRAM(S): at 05:37

## 2021-08-04 RX ADMIN — MIRTAZAPINE 7.5 MILLIGRAM(S): 45 TABLET, ORALLY DISINTEGRATING ORAL at 22:16

## 2021-08-04 RX ADMIN — HEPARIN SODIUM 5000 UNIT(S): 5000 INJECTION INTRAVENOUS; SUBCUTANEOUS at 00:02

## 2021-08-04 RX ADMIN — Medication 650 MILLIGRAM(S): at 23:06

## 2021-08-04 RX ADMIN — SENNA PLUS 2 TABLET(S): 8.6 TABLET ORAL at 22:16

## 2021-08-04 RX ADMIN — POLYETHYLENE GLYCOL 3350 17 GRAM(S): 17 POWDER, FOR SOLUTION ORAL at 11:09

## 2021-08-04 RX ADMIN — OLANZAPINE 5 MILLIGRAM(S): 15 TABLET, FILM COATED ORAL at 22:16

## 2021-08-04 RX ADMIN — LACTULOSE 30 GRAM(S): 10 SOLUTION ORAL at 16:17

## 2021-08-04 RX ADMIN — Medication 650 MILLIGRAM(S): at 06:17

## 2021-08-04 RX ADMIN — SODIUM CHLORIDE 42 MILLILITER(S): 9 INJECTION, SOLUTION INTRAVENOUS at 22:17

## 2021-08-04 RX ADMIN — VALSARTAN 160 MILLIGRAM(S): 80 TABLET ORAL at 05:37

## 2021-08-04 RX ADMIN — ONDANSETRON 4 MILLIGRAM(S): 8 TABLET, FILM COATED ORAL at 23:47

## 2021-08-04 RX ADMIN — Medication 650 MILLIGRAM(S): at 23:30

## 2021-08-04 RX ADMIN — HEPARIN SODIUM 5000 UNIT(S): 5000 INJECTION INTRAVENOUS; SUBCUTANEOUS at 11:08

## 2021-08-04 RX ADMIN — Medication 5 MILLIGRAM(S): at 16:18

## 2021-08-04 NOTE — PROGRESS NOTE ADULT - SUBJECTIVE AND OBJECTIVE BOX
[facility]  [id number]    History:  The patient is followed for multiple T spine fxs s/p fall. Complains of back pain. Taking Tylenol for pain with some relief but does not want to take any other pain meds. No new complaints. No acute motor or sensory changes are reported.      Physical exam: Lying in bed in NAD, awake and alert  Back: skin intact. no erythema. diffuse non specific tenderness to back  UE: SILT B/L.  strength equal and in tact B/L. 5/5 motor throughout b/l. ext warm cap refill brisk B/L  LE: SILT B/L. 4+/5 dorsi/plantarflexion and HF B/L. ext warm, cap refill brisk B/L. calf soft NT B/L.    Primary Orthopedic Assessment:  • 87 yo female with multiple T spine fxs    Plan:   • DVT prophylaxis as prescribed, including use of compression devices and ankle pumps  - MRI C,T,L spine with contrast performed  - PT/OT as tolerated  - recommend medical/metastatic workup per primary team  - pain control  - Dispo planning likely LOR

## 2021-08-04 NOTE — PROGRESS NOTE ADULT - ATTENDING COMMENTS
87 yo F with PMH of HTN, MVP, and dizziness is s/p fall in shower and was found to have T3, T4 compression fx w/ questionable spine mass, SP fx T3, T7, T8, posterior scalp lac s/p repair, and left thigh hematoma  AAOx3, NAD, tolerating diet, MRI spine shows no metastatic disease  PUL CTA  CV RRR  GI Benign  Plan  1. Awaiting Spine surgery recommendation  2. Analgesia for pain control with non-narcotics  3. Continued Regular diet  4. PT/OT for mobility with TLSO  5. Continue DVT ppx      code [id number]
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
## 2021-08-04 NOTE — PROGRESS NOTE ADULT - ASSESSMENT
Attending statement:  I have personally seen this patient, and formed a face to face diagnostic evaluation on this patient on this date.  I have reviewed the PA, NP and or Medical/PA student and/or Resident documentation and agree with the history, physical exam and plan of care except if noted otherwise.     Had a pleasant conversation with the patient as well as her son on today's date August 4. I discussed CAT scan MRI and MRI with contrast findings that show suspicious for metastatic disease. I discussed the complexity of the thoracic laminectomy and fusions. Patient and family wish for nonoperative management and I agree with that based upon her frailty and her age. Ultimate diagnosis through either a biopsy via interventional radiology as well as a metastatic workup I believe can be beneficial to the home in on a treatment protocol such as radiation therapy etc. to reiterate the family does not want operative management. Continue medical management and even a possibility of a palliative care discussion. Attending statement:  I have personally seen this patient, and formed a face to face diagnostic evaluation on this patient on this date.  I have reviewed the PA, NP and or Medical/PA student and/or Resident documentation and agree with the history, physical exam and plan of care except if noted otherwise.   discussed the care of this patient with her primary care physician primary care physician called me directly on recommendation from his son he wants hematology oncology to follow her. discuss with him that I think palliative care is a very reasonable option and he completely agrees as her primary care physician he states she is very frail and will probably not survive this type of an operation such as spine reconstruction.    Had a pleasant conversation with the patient as well as her son on today's date August 4. I discussed CAT scan MRI and MRI with contrast findings that show suspicious for metastatic disease. I discussed the complexity of the thoracic laminectomy and fusions. Patient and family wish for nonoperative management and I agree with that based upon her frailty and her age. Ultimate diagnosis through either a biopsy via interventional radiology as well as a metastatic workup I believe can be beneficial to the home in on a treatment protocol such as radiation therapy etc. to reiterate the family does not want operative management. Continue medical management and even a possibility of a palliative care discussion.

## 2021-08-05 PROCEDURE — 99223 1ST HOSP IP/OBS HIGH 75: CPT

## 2021-08-05 RX ORDER — MIRTAZAPINE 45 MG/1
15 TABLET, ORALLY DISINTEGRATING ORAL AT BEDTIME
Refills: 0 | Status: DISCONTINUED | OUTPATIENT
Start: 2021-08-05 | End: 2021-08-09

## 2021-08-05 RX ADMIN — HEPARIN SODIUM 5000 UNIT(S): 5000 INJECTION INTRAVENOUS; SUBCUTANEOUS at 05:21

## 2021-08-05 RX ADMIN — Medication 5 MILLIGRAM(S): at 05:21

## 2021-08-05 RX ADMIN — OLANZAPINE 5 MILLIGRAM(S): 15 TABLET, FILM COATED ORAL at 22:25

## 2021-08-05 RX ADMIN — Medication 650 MILLIGRAM(S): at 22:26

## 2021-08-05 RX ADMIN — SENNA PLUS 2 TABLET(S): 8.6 TABLET ORAL at 22:26

## 2021-08-05 RX ADMIN — Medication 3 MILLIGRAM(S): at 22:26

## 2021-08-05 RX ADMIN — VALSARTAN 160 MILLIGRAM(S): 80 TABLET ORAL at 05:21

## 2021-08-05 RX ADMIN — HEPARIN SODIUM 5000 UNIT(S): 5000 INJECTION INTRAVENOUS; SUBCUTANEOUS at 22:26

## 2021-08-05 RX ADMIN — Medication 1 ENEMA: at 14:42

## 2021-08-05 RX ADMIN — MIRTAZAPINE 15 MILLIGRAM(S): 45 TABLET, ORALLY DISINTEGRATING ORAL at 22:25

## 2021-08-05 RX ADMIN — Medication 650 MILLIGRAM(S): at 23:00

## 2021-08-05 NOTE — BH CONSULTATION LIAISON ASSESSMENT NOTE - NSBHCHARTREVIEWINVESTIGATE_PSY_A_CORE FT
< from: 12 Lead ECG (08.01.21 @ 17:05) >    Ventricular Rate 73 BPM    Atrial Rate 73 BPM    P-R Interval 138 ms    QRS Duration 82 ms    Q-T Interval 382 ms    QTC Calculation(Bazett) 420 ms    P Axis 68 degrees    R Axis -6 degrees    T Axis 62 degrees    Diagnosis Line *** Poor data quality, interpretation may be adversely affected  Normal sinus rhythm  Normal ECG    Confirmed by Juan Luis Britt ([id number]) on 8/1/2021 6:56:02 PM    < end of copied text >

## 2021-08-05 NOTE — BH CONSULTATION LIAISON ASSESSMENT NOTE - CURRENT MEDICATION
MEDICATIONS  (STANDING):  acetaminophen    Suspension .. 650 milliGRAM(s) Oral every 6 hours  bisacodyl 5 milliGRAM(s) Oral every 12 hours  heparin   Injectable 5000 Unit(s) SubCutaneous every 8 hours  lactated ringers. 1000 milliLiter(s) (42 mL/Hr) IV Continuous <Continuous>  lidocaine   4% Patch 1 Patch Transdermal every 24 hours  melatonin 3 milliGRAM(s) Oral at bedtime  mirtazapine 7.5 milliGRAM(s) Oral at bedtime  OLANZapine 5 milliGRAM(s) Oral at bedtime  polyethylene glycol 3350 17 Gram(s) Oral daily  saline laxative (FLEET) Rectal Enema 1 Enema Rectal once  senna 2 Tablet(s) Oral at bedtime  valsartan 160 milliGRAM(s) Oral daily    MEDICATIONS  (PRN):
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
## 2021-08-05 NOTE — PROGRESS NOTE ADULT - ATTENDING COMMENTS
Patient is awake alert  Awaiting MRI  Neurologically intact  hemodynamically stable  EF 60%  Once MRI done, decision by NS

## 2021-08-05 NOTE — BH CONSULTATION LIAISON ASSESSMENT NOTE - DESCRIPTION
patient is a  87 y/o female who is a retired  and with 2 biological children as well as a step daughter

## 2021-08-05 NOTE — PROGRESS NOTE ADULT - ASSESSMENT
85 y/o F w/ PMH of HTN, MVP, dizziness presenting s/p ground level fall in shower. Found to have T3, T4 compression fxs w/ questionable mass, posterior scalp lac s/p repair, and left thigh hematoma    -MRI completed suspicious for metastatic disease, will treat with conservative management   -OOb as tolerated  -PRN pain control with non-narcotics  -Regular diet  -Home meds  -DVT PPX: SCD's, lovenox  -dispo once evaluated by PT/OT
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
## 2021-08-05 NOTE — BH CONSULTATION LIAISON ASSESSMENT NOTE - NSBHADMITCOUNSEL_PSY_A_CORE
diagnostic results/impressions and/or recommended studies/risks and benefits of treatment options/instructions for management, treatment and follow up/importance of adherence to chosen treatment/risk factor reduction

## 2021-08-05 NOTE — BH CONSULTATION LIAISON ASSESSMENT NOTE - NSBHCHARTREVIEWLAB_PSY_A_CORE FT
Basic Metabolic Panel in AM (08.02.21 @ 07:48)    Sodium, Serum: 138 mmol/L    Potassium, Serum: 4.0 mmol/L    Chloride, Serum: 102 mmol/L    Carbon Dioxide, Serum: 26.0 mmol/L    Anion Gap, Serum: 10 mmol/L    Blood Urea Nitrogen, Serum: 24.0 mg/dL    Creatinine, Serum: 0.67 mg/dL    Glucose, Serum: 104 mg/dL    Calcium, Total Serum: 9.2 mg/dL    eGFR if Non : 81: Interpretative comment  The units for eGFR are mL/min/1.73M2 (normalized body surface area). The  eGFR is calculated from a serum creatinine using the CKD-EPI equation.  Other variables required for calculation are race, age and sex. Among  patients with chronic kidney disease (CKD), the eGFR is useful in  determining the stage of disease according to KDOQI CKD classification.  All eGFR results are reported numerically with the following  interpretation.          GFR                    With                 Without     (ml/min/1.73 m2)    Kidney Damage       Kidney Damage        >= 90                    Stage 1                     Normal        60-89                    Stage 2                     Decreased GFR        30-59     Stage 3                     Stage 3        15-29                    Stage 4                     Stage 4        < 15                      Stage 5                     Stage 5  Each stage of CKD assumes that the associated GFR level has been in  effect for at least 3 months. Determination of stages one and two (with  eGFR > 59 ml/min/m2) requires estimation of kidney damage for at least 3  months as defined by structural or functional abnormalities.  Limitations: All estimates of GFR will be less accurate for patients at  extremes of muscle mass (including but not limited to frail elderly,  critically ill, or cancer patients), those with unusual diets, and those  with conditions associated with reduced secretion or extrarenal  elimination of creatinine. The eGFR equation is not recommended for use  in patients with unstable creatinine levels. mL/min/1.73M2    eGFR if African American: 94 mL/min/1.73M2

## 2021-08-05 NOTE — BH CONSULTATION LIAISON ASSESSMENT NOTE - NSICDXPASTMEDICALHX_GEN_ALL_CORE_FT
PAST MEDICAL HISTORY:  Dizziness     Fecal impaction     Hemorrhoids     HTN (hypertension)     MVP (mitral valve prolapse)

## 2021-08-05 NOTE — BH CONSULTATION LIAISON ASSESSMENT NOTE - NSBHCHARTREVIEWVS_PSY_A_CORE FT
Vital Signs Last 24 Hrs  T(C): 36.6 (05 Aug 2021 11:12), Max: 36.6 (05 Aug 2021 11:12)  T(F): 97.9 (05 Aug 2021 11:12), Max: 97.9 (05 Aug 2021 11:12)  HR: 83 (05 Aug 2021 11:12) (72 - 87)  BP: 144/77 (05 Aug 2021 11:12) (139/66 - 176/77)  BP(mean): --  RR: 18 (05 Aug 2021 11:12) (18 - 18)  SpO2: 96% (05 Aug 2021 11:12) (93% - 96%)
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
## 2021-08-06 PROCEDURE — 99233 SBSQ HOSP IP/OBS HIGH 50: CPT

## 2021-08-06 PROCEDURE — 99497 ADVNCD CARE PLAN 30 MIN: CPT | Mod: 25

## 2021-08-06 RX ORDER — LACTULOSE 10 G/15ML
10 SOLUTION ORAL DAILY
Refills: 0 | Status: DISCONTINUED | OUTPATIENT
Start: 2021-08-06 | End: 2021-08-09

## 2021-08-06 RX ADMIN — OLANZAPINE 5 MILLIGRAM(S): 15 TABLET, FILM COATED ORAL at 21:23

## 2021-08-06 RX ADMIN — HEPARIN SODIUM 5000 UNIT(S): 5000 INJECTION INTRAVENOUS; SUBCUTANEOUS at 05:10

## 2021-08-06 RX ADMIN — Medication 3 MILLIGRAM(S): at 21:17

## 2021-08-06 RX ADMIN — HEPARIN SODIUM 5000 UNIT(S): 5000 INJECTION INTRAVENOUS; SUBCUTANEOUS at 13:04

## 2021-08-06 RX ADMIN — Medication 5 MILLIGRAM(S): at 17:04

## 2021-08-06 RX ADMIN — SODIUM CHLORIDE 42 MILLILITER(S): 9 INJECTION, SOLUTION INTRAVENOUS at 14:00

## 2021-08-06 RX ADMIN — Medication 5 MILLIGRAM(S): at 05:10

## 2021-08-06 RX ADMIN — Medication 650 MILLIGRAM(S): at 11:06

## 2021-08-06 RX ADMIN — VALSARTAN 160 MILLIGRAM(S): 80 TABLET ORAL at 05:10

## 2021-08-06 RX ADMIN — LACTULOSE 10 GRAM(S): 10 SOLUTION ORAL at 16:22

## 2021-08-06 RX ADMIN — Medication 650 MILLIGRAM(S): at 05:10

## 2021-08-06 RX ADMIN — MIRTAZAPINE 15 MILLIGRAM(S): 45 TABLET, ORALLY DISINTEGRATING ORAL at 21:17

## 2021-08-06 RX ADMIN — Medication 650 MILLIGRAM(S): at 12:06

## 2021-08-06 RX ADMIN — HEPARIN SODIUM 5000 UNIT(S): 5000 INJECTION INTRAVENOUS; SUBCUTANEOUS at 21:17

## 2021-08-06 RX ADMIN — Medication 650 MILLIGRAM(S): at 06:00

## 2021-08-06 NOTE — OCCUPATIONAL THERAPY INITIAL EVALUATION ADULT - DIAGNOSIS, OT EVAL
86 year old Female with PMH HTN, MVP, dizziness, presenting s/p ground level fall in shower. Pt found to have T3, T4 compression fx with questionable mass, posterior scalp laceration s/p repair and Left thigh hematoma

## 2021-08-06 NOTE — PROGRESS NOTE ADULT - SUBJECTIVE AND OBJECTIVE BOX
INTERVAL HPI/OVERNIGHT EVENTS:    Patient evaluated at bedside. No acute distress. No acute events overnight. Ortho evaluated patient, noted no TLSO and PT/OT can evaluate patient.  Psych evaluated and adjusted medications.      MEDICATIONS  (STANDING):  acetaminophen    Suspension .. 650 milliGRAM(s) Oral every 6 hours  bisacodyl 5 milliGRAM(s) Oral every 12 hours  heparin   Injectable 5000 Unit(s) SubCutaneous every 8 hours  lactated ringers. 1000 milliLiter(s) (42 mL/Hr) IV Continuous <Continuous>  lidocaine   4% Patch 1 Patch Transdermal every 24 hours  melatonin 3 milliGRAM(s) Oral at bedtime  mirtazapine 15 milliGRAM(s) Oral at bedtime  OLANZapine 5 milliGRAM(s) Oral at bedtime  polyethylene glycol 3350 17 Gram(s) Oral daily  senna 2 Tablet(s) Oral at bedtime  valsartan 160 milliGRAM(s) Oral daily    MEDICATIONS  (PRN):      Vital Signs Last 24 Hrs  T(C): 36.4 (05 Aug 2021 21:48), Max: 36.6 (05 Aug 2021 11:12)  T(F): 97.5 (05 Aug 2021 21:48), Max: 97.9 (05 Aug 2021 11:12)  HR: 80 (05 Aug 2021 21:48) (80 - 87)  BP: 174/92 (05 Aug 2021 21:48) (139/66 - 174/92)  BP(mean): --  RR: 18 (05 Aug 2021 21:48) (18 - 18)  SpO2: 93% (05 Aug 2021 21:48) (93% - 96%)    Constitutional: resting comfortably    Respiratory: no respiratory distress, no conversational dyspnea, no supplemental o2 needed   Cardiovascular: NSR  Gastrointestinal: abdomen soft, non-tender atraumatic   Genitourinary: voiding spontaneously   Extremities: moves all extremities   Neurological: A&OX3  Skin: warm, dry and no rashes     I&O's Detail    04 Aug 2021 07:01  -  05 Aug 2021 07:00  --------------------------------------------------------  IN:    Lactated Ringers: 462 mL  Total IN: 462 mL    OUT:    Voided (mL): 750 mL  Total OUT: 750 mL    Total NET: -288 mL          LABS:                        11.0   3.73  )-----------( 158      ( 04 Aug 2021 07:06 )             34.8                 RADIOLOGY & ADDITIONAL STUDIES:

## 2021-08-06 NOTE — OCCUPATIONAL THERAPY INITIAL EVALUATION ADULT - PLANNED THERAPY INTERVENTIONS, OT EVAL
toilet/ADL retraining/balance training/bed mobility training/fine motor coordination training/motor coordination training/neuromuscular re-education/strengthening/transfer training

## 2021-08-06 NOTE — OCCUPATIONAL THERAPY INITIAL EVALUATION ADULT - SPECIAL TRAINING, OT EVAL
Pt ambulated with RW and CGA x1, +external cues to maintain spinal precautions around bed area, to and from the bathroom and in the hallway due to decreased balance and strength. Pt educated in energy conservation techniques including proper breathing and activity pacing.

## 2021-08-06 NOTE — OCCUPATIONAL THERAPY INITIAL EVALUATION ADULT - ASSISTIVE DEVICE FOR TOILET TRANSFER, REHAB EVAL
Pt reports has commode over toilet at home and was using PTA, recommend continued use upon discharge to increase safety and independence with transfer/commode/rolling walker

## 2021-08-06 NOTE — DIETITIAN INITIAL EVALUATION ADULT. - ADD RECOMMEND
Rx: MVI and vitamin C 500mg daily. Continue Remeron to potentially increase appetite. Continue bowel regimen PRN. Encourage po intake, monitor diet tolerance, and provide assistance at meals as needed. Obtain daily weights to monitor trends.

## 2021-08-06 NOTE — OCCUPATIONAL THERAPY INITIAL EVALUATION ADULT - ADDITIONAL COMMENTS
Pt has a shower with doors with grab bars on door and a detachable shower head. Pt owns a RW and commode. Pt states 24/7 aide assists with transfers/ADLs and IADLs (cooking, laundry, food shopping). Pt reports has a cleaning lady. Pt is right handed and does not drive. Pt states has aide drive her or utilizes uber.

## 2021-08-06 NOTE — DIETITIAN INITIAL EVALUATION ADULT. - PERTINENT MEDS FT
MEDICATIONS  (STANDING):  acetaminophen    Suspension .. 650 milliGRAM(s) Oral every 6 hours  bisacodyl 5 milliGRAM(s) Oral every 12 hours  heparin   Injectable 5000 Unit(s) SubCutaneous every 8 hours  lactated ringers. 1000 milliLiter(s) (42 mL/Hr) IV Continuous <Continuous>  lidocaine   4% Patch 1 Patch Transdermal every 24 hours  melatonin 3 milliGRAM(s) Oral at bedtime  mirtazapine 15 milliGRAM(s) Oral at bedtime  OLANZapine 5 milliGRAM(s) Oral at bedtime  polyethylene glycol 3350 17 Gram(s) Oral daily  senna 2 Tablet(s) Oral at bedtime  valsartan 160 milliGRAM(s) Oral daily    MEDICATIONS  (PRN):
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
## 2021-08-06 NOTE — OCCUPATIONAL THERAPY INITIAL EVALUATION ADULT - PHYSICAL ASSIST/NONPHYSICAL ASSIST: SIT/STAND, REHAB EVAL
Cues for sequencing of movement and for safety for proper hand placement prior to/during transfer/verbal cues/1 person assist

## 2021-08-06 NOTE — DIETITIAN NUTRITION RISK NOTIFICATION - TREATMENT: THE FOLLOWING DIET HAS BEEN RECOMMENDED
Diet, Regular:   DASH/TLC {Sodium & Cholesterol Restricted} (DASH) (08-01-21 @ 15:19) [Active]

## 2021-08-06 NOTE — PROGRESS NOTE ADULT - ATTENDING COMMENTS
Patient is alert. Had some pain in the back and R chest at night  Does not want narcotics for most part  No TLSO brace needed  PT/OT eval appreciated  Awaiting DC

## 2021-08-06 NOTE — DIETITIAN INITIAL EVALUATION ADULT. - OTHER INFO
86 year old female with PMH of HTN, MVP, dizziness presenting s/p ground level fall in shower. Found to have T3, T4 compression fxs w/ questionable mass, posterior scalp lac s/p repair, and left thigh hematoma. MRI completed suspicious for metastatic disease. Pt reports she has good appetite at this time and states she feels like she has been eating well. Per documentation, aware pts son feels the opposite and does not think pt has been eating enough. Pt did not consume breakfast yet, reports she just ordered some. Pt seemed somewhat anxious during interview, she states she feels weak and has some pain, became tearful. Limited NFPE conducted. RD to follow up as feasible.

## 2021-08-06 NOTE — PHYSICAL THERAPY INITIAL EVALUATION ADULT - ADDITIONAL COMMENTS
Pt lives in a house with  0 steps to enter and 0 stairs inside.  Pt owns medical equipment: [employer]  Pt lives with: Alone but has 24/7 aide assistance   Someone is always available to provide assist.
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
## 2021-08-06 NOTE — OCCUPATIONAL THERAPY INITIAL EVALUATION ADULT - VISUAL ACUITY
Pt reports wears glasses for distance; states feels vision is blurry but this is not a new complaint. Pt able to correctly identify # of digits in central and peripheral fields bilaterally

## 2021-08-06 NOTE — OCCUPATIONAL THERAPY INITIAL EVALUATION ADULT - SOCIAL CONCERNS
Pt reports has no support network other than private hire aide/Complex psychosocial needs/coping issues

## 2021-08-06 NOTE — OCCUPATIONAL THERAPY INITIAL EVALUATION ADULT - GENERAL OBSERVATIONS, REHAB EVAL
Received pt semi-Pereira in bed, NAD, +alarm, +Primafit, +IV, +on RA, A&Ox4. Patient agreeable to OT evaluation
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
## 2021-08-06 NOTE — DIETITIAN INITIAL EVALUATION ADULT. - ETIOLOGY
related to inability to meet sufficient protein-energy in setting of advanced age, T3, T4 compression fxs with questionable mass (suspicious for metastatic disease)

## 2021-08-06 NOTE — PROGRESS NOTE ADULT - ASSESSMENT
87 y/o F w/ PMH of HTN, MVP, dizziness presenting s/p ground level fall in shower. Found to have T3, T4 compression fxs w/ questionable mass, posterior scalp lac s/p repair, and left thigh hematoma    -MRI completed suspicious for metastatic disease, metastatic work up recommended   -Palliative consult, pendign recs   -PT/OT re-consulted, pending recs  -Psych consulted, recommending adjusting remeron    -OOB as tolerated  -PRN pain control with non-narcotics  -Regular diet  -Home meds  -DVT PPX: SCD's, lovenox  -Dispo once evaluated by PT/OT

## 2021-08-06 NOTE — OCCUPATIONAL THERAPY INITIAL EVALUATION ADULT - LIVES WITH, PROFILE
Pt reports lives in house alone but has a 24/7 hire aide. Pt has no steps to enter and no steps inside to negotiate./alone

## 2021-08-06 NOTE — OCCUPATIONAL THERAPY INITIAL EVALUATION ADULT - COORDINATION ASSESSED, REHAB EVAL
Bilateral UE WFL; assessed within spinal precautions/finger to nose
negative - not suicidal, no depression

## 2021-08-06 NOTE — OCCUPATIONAL THERAPY INITIAL EVALUATION ADULT - MANUAL MUSCLE TESTING RESULTS, REHAB EVAL
Bilateral shoulders grossly assessed with AROM against gravity 3/5, bilateral elbows grossly assessed with AROM against gravity 3/5, bilateral gross grasp 3+/5.

## 2021-08-07 LAB
ERYTHROCYTE [SEDIMENTATION RATE] IN BLOOD: 33 MM/HR — HIGH (ref 0–20)
LDH SERPL L TO P-CCNC: 241 U/L — HIGH (ref 98–192)

## 2021-08-07 RX ADMIN — HEPARIN SODIUM 5000 UNIT(S): 5000 INJECTION INTRAVENOUS; SUBCUTANEOUS at 05:17

## 2021-08-07 RX ADMIN — Medication 650 MILLIGRAM(S): at 06:00

## 2021-08-07 RX ADMIN — Medication 650 MILLIGRAM(S): at 12:28

## 2021-08-07 RX ADMIN — HEPARIN SODIUM 5000 UNIT(S): 5000 INJECTION INTRAVENOUS; SUBCUTANEOUS at 13:36

## 2021-08-07 RX ADMIN — HEPARIN SODIUM 5000 UNIT(S): 5000 INJECTION INTRAVENOUS; SUBCUTANEOUS at 22:18

## 2021-08-07 RX ADMIN — VALSARTAN 160 MILLIGRAM(S): 80 TABLET ORAL at 05:16

## 2021-08-07 RX ADMIN — Medication 650 MILLIGRAM(S): at 11:30

## 2021-08-07 RX ADMIN — Medication 5 MILLIGRAM(S): at 05:17

## 2021-08-07 RX ADMIN — Medication 650 MILLIGRAM(S): at 05:16

## 2021-08-07 RX ADMIN — Medication 650 MILLIGRAM(S): at 18:38

## 2021-08-07 RX ADMIN — Medication 650 MILLIGRAM(S): at 17:19

## 2021-08-07 NOTE — PROGRESS NOTE ADULT - SUBJECTIVE AND OBJECTIVE BOX
INTERVAL HPI/OVERNIGHT EVENTS:    Seen by palliative and onc yesterday.     MEDICATIONS  (STANDING):  acetaminophen    Suspension .. 650 milliGRAM(s) Oral every 6 hours  bisacodyl 5 milliGRAM(s) Oral every 12 hours  heparin   Injectable 5000 Unit(s) SubCutaneous every 8 hours  lactated ringers. 1000 milliLiter(s) (42 mL/Hr) IV Continuous <Continuous>  lactulose Syrup 10 Gram(s) Oral daily  lidocaine   4% Patch 1 Patch Transdermal every 24 hours  melatonin 3 milliGRAM(s) Oral at bedtime  mirtazapine 15 milliGRAM(s) Oral at bedtime  OLANZapine 5 milliGRAM(s) Oral at bedtime  polyethylene glycol 3350 17 Gram(s) Oral daily  senna 2 Tablet(s) Oral at bedtime  valsartan 160 milliGRAM(s) Oral daily    MEDICATIONS  (PRN):      Vital Signs Last 24 Hrs  T(C): 36.4 (07 Aug 2021 04:36), Max: 36.4 (07 Aug 2021 04:36)  T(F): 97.6 (07 Aug 2021 04:36), Max: 97.6 (07 Aug 2021 04:36)  HR: 72 (07 Aug 2021 04:36) (72 - 90)  BP: 155/84 (07 Aug 2021 04:36) (100/52 - 155/84)  BP(mean): --  RR: 18 (07 Aug 2021 04:36) (18 - 18)  SpO2: 94% (07 Aug 2021 04:36) (94% - 98%)    Constitutional: resting comfortably    Respiratory: no respiratory distress, no conversational dyspnea, no supplemental o2 needed   Cardiovascular: NSR  Gastrointestinal: abdomen soft, non-tender atraumatic   Genitourinary: voiding spontaneously   Extremities: moves all extremities   Neurological: A&OX3  Skin: warm, dry and no rashes         I&O's Detail    06 Aug 2021 07:01  -  07 Aug 2021 07:00  --------------------------------------------------------  IN:    Lactated Ringers: 462 mL    Oral Fluid: 240 mL  Total IN: 702 mL    OUT:  Total OUT: 0 mL    Total NET: 702 mL          LABS:                RADIOLOGY & ADDITIONAL STUDIES:

## 2021-08-07 NOTE — PROGRESS NOTE ADULT - ASSESSMENT
87 y/o F w/ PMH of HTN, MVP, dizziness presenting s/p ground level fall in shower. Found to have T3, T4 compression fxs w/ questionable mass, posterior scalp lac s/p repair, and left thigh hematoma    -MRI completed suspicious for metastatic disease, metastatic work up recommended   -seen by palliative and onc  -pending home aid set up by son will KATHY

## 2021-08-07 NOTE — PROGRESS NOTE ADULT - ATTENDING COMMENTS
Patient resting comfortably.  Slept through the night and analgesia adjusted / working well  Evaluated by oncology in face of possible malignancy/metastasis to the back  Palliative care consult appreciated  DC patient when arrangements made Patient resting comfortably.  Slept through the night and analgesia adjusted / working well  Evaluated by oncology in face of possible malignancy/metastasis to the back  Patient is being followed already by hematology for leukopenia/anemia  Lymphoma/leukemia dg entertained, but no proof  Patient is not a candidate for any aggressive workup  Palliative care consult appreciated  DC patient when arrangements made

## 2021-08-08 RX ADMIN — Medication 650 MILLIGRAM(S): at 23:22

## 2021-08-08 RX ADMIN — Medication 650 MILLIGRAM(S): at 05:13

## 2021-08-08 RX ADMIN — HEPARIN SODIUM 5000 UNIT(S): 5000 INJECTION INTRAVENOUS; SUBCUTANEOUS at 21:15

## 2021-08-08 RX ADMIN — VALSARTAN 160 MILLIGRAM(S): 80 TABLET ORAL at 05:12

## 2021-08-08 RX ADMIN — MIRTAZAPINE 15 MILLIGRAM(S): 45 TABLET, ORALLY DISINTEGRATING ORAL at 21:15

## 2021-08-08 RX ADMIN — HEPARIN SODIUM 5000 UNIT(S): 5000 INJECTION INTRAVENOUS; SUBCUTANEOUS at 05:13

## 2021-08-08 RX ADMIN — OLANZAPINE 5 MILLIGRAM(S): 15 TABLET, FILM COATED ORAL at 21:15

## 2021-08-08 RX ADMIN — HEPARIN SODIUM 5000 UNIT(S): 5000 INJECTION INTRAVENOUS; SUBCUTANEOUS at 16:22

## 2021-08-08 RX ADMIN — Medication 650 MILLIGRAM(S): at 06:13

## 2021-08-08 NOTE — PROGRESS NOTE ADULT - ASSESSMENT
86F PMHx HTN, MVP, dizziness presenting s/p ground level fall in shower. Found to have T3, T4 compression fxs w/ questionable mass, posterior scalp lac s/p repair, and left thigh hematoma.      - continue PT   - MRI completed suspicious for metastatic disease  - seen by Heme/Onc, not a candidate for workup  - palliative consult obtained for GOC discussion, currently ongoing  - await son to put aide in place for pt. to go home

## 2021-08-08 NOTE — PROGRESS NOTE ADULT - SUBJECTIVE AND OBJECTIVE BOX
INTERVAL HPI/OVERNIGHT EVENTS: c/o pain but refusing meds, mild confusion, no overnight events      MEDICATIONS  (STANDING):  acetaminophen    Suspension .. 650 milliGRAM(s) Oral every 6 hours  bisacodyl 5 milliGRAM(s) Oral every 12 hours  heparin   Injectable 5000 Unit(s) SubCutaneous every 8 hours  lactulose Syrup 10 Gram(s) Oral daily  lidocaine   4% Patch 1 Patch Transdermal every 24 hours  melatonin 3 milliGRAM(s) Oral at bedtime  mirtazapine 15 milliGRAM(s) Oral at bedtime  OLANZapine 5 milliGRAM(s) Oral at bedtime  polyethylene glycol 3350 17 Gram(s) Oral daily  senna 2 Tablet(s) Oral at bedtime  valsartan 160 milliGRAM(s) Oral daily    MEDICATIONS  (PRN):      Vital Signs Last 24 Hrs  T(C): 36.6 (07 Aug 2021 11:00), Max: 36.6 (07 Aug 2021 11:00)  T(F): 97.9 (07 Aug 2021 11:00), Max: 97.9 (07 Aug 2021 11:00)  HR: 73 (08 Aug 2021 00:00) (72 - 96)  BP: 165/60 (08 Aug 2021 00:00) (100/60 - 165/60)  BP(mean): --  RR: 18 (08 Aug 2021 00:00) (18 - 18)  SpO2: 98% (08 Aug 2021 00:00) (94% - 98%)    PHYSICAL EXAM:      Constitutional: NAD    Respiratory: no accessory muscle use or conversational dyspnea    Cardiovascular: RRR    Gastrointestinal: soft, NT/ND    Extremities: HERNADEZ          I&O's Detail    06 Aug 2021 07:01  -  07 Aug 2021 07:00  --------------------------------------------------------  IN:    Lactated Ringers: 462 mL    Oral Fluid: 240 mL  Total IN: 702 mL    OUT:  Total OUT: 0 mL    Total NET: 702 mL      07 Aug 2021 07:01  -  08 Aug 2021 02:35  --------------------------------------------------------  IN:    Lactated Ringers: 42 mL  Total IN: 42 mL    OUT:    Voided (mL): 700 mL  Total OUT: 700 mL    Total NET: -658 mL          LABS:                RADIOLOGY & ADDITIONAL STUDIES:

## 2021-08-09 ENCOUNTER — TRANSCRIPTION ENCOUNTER (OUTPATIENT)
Age: 86
End: 2021-08-09

## 2021-08-09 VITALS
DIASTOLIC BLOOD PRESSURE: 87 MMHG | RESPIRATION RATE: 18 BRPM | HEART RATE: 75 BPM | OXYGEN SATURATION: 97 % | TEMPERATURE: 98 F | SYSTOLIC BLOOD PRESSURE: 154 MMHG

## 2021-08-09 LAB
IGA FLD-MCNC: 218 MG/DL — SIGNIFICANT CHANGE UP (ref 84–499)
IGA FLD-MCNC: 218 MG/DL — SIGNIFICANT CHANGE UP (ref 84–499)
IGG FLD-MCNC: 890 MG/DL — SIGNIFICANT CHANGE UP (ref 610–1660)
IGM SERPL-MCNC: 176 MG/DL — SIGNIFICANT CHANGE UP (ref 35–242)
IGM SERPL-MCNC: 176 MG/DL — SIGNIFICANT CHANGE UP (ref 35–242)
KAPPA LC SER QL IFE: 2.58 MG/DL — HIGH (ref 0.33–1.94)
KAPPA/LAMBDA FREE LIGHT CHAIN RATIO, SERUM: 1.43 RATIO — SIGNIFICANT CHANGE UP (ref 0.26–1.65)
LAMBDA LC SER QL IFE: 1.81 MG/DL — SIGNIFICANT CHANGE UP (ref 0.57–2.63)

## 2021-08-09 PROCEDURE — 97167 OT EVAL HIGH COMPLEX 60 MIN: CPT

## 2021-08-09 PROCEDURE — 99232 SBSQ HOSP IP/OBS MODERATE 35: CPT

## 2021-08-09 PROCEDURE — 74176 CT ABD & PELVIS W/O CONTRAST: CPT | Mod: MG

## 2021-08-09 PROCEDURE — C8929: CPT

## 2021-08-09 PROCEDURE — 97530 THERAPEUTIC ACTIVITIES: CPT

## 2021-08-09 PROCEDURE — 99285 EMERGENCY DEPT VISIT HI MDM: CPT

## 2021-08-09 PROCEDURE — 97535 SELF CARE MNGMENT TRAINING: CPT

## 2021-08-09 PROCEDURE — 73502 X-RAY EXAM HIP UNI 2-3 VIEWS: CPT

## 2021-08-09 PROCEDURE — 99231 SBSQ HOSP IP/OBS SF/LOW 25: CPT

## 2021-08-09 PROCEDURE — 85652 RBC SED RATE AUTOMATED: CPT

## 2021-08-09 PROCEDURE — 84165 PROTEIN E-PHORESIS SERUM: CPT

## 2021-08-09 PROCEDURE — 72142 MRI NECK SPINE W/DYE: CPT

## 2021-08-09 PROCEDURE — 80048 BASIC METABOLIC PNL TOTAL CA: CPT

## 2021-08-09 PROCEDURE — 82784 ASSAY IGA/IGD/IGG/IGM EACH: CPT

## 2021-08-09 PROCEDURE — 72149 MRI LUMBAR SPINE W/DYE: CPT

## 2021-08-09 PROCEDURE — 72147 MRI CHEST SPINE W/DYE: CPT

## 2021-08-09 PROCEDURE — 93880 EXTRACRANIAL BILAT STUDY: CPT

## 2021-08-09 PROCEDURE — 36415 COLL VENOUS BLD VENIPUNCTURE: CPT

## 2021-08-09 PROCEDURE — 84484 ASSAY OF TROPONIN QUANT: CPT

## 2021-08-09 PROCEDURE — 72125 CT NECK SPINE W/O DYE: CPT

## 2021-08-09 PROCEDURE — 71250 CT THORAX DX C-: CPT | Mod: MG

## 2021-08-09 PROCEDURE — 85027 COMPLETE CBC AUTOMATED: CPT

## 2021-08-09 PROCEDURE — U0005: CPT

## 2021-08-09 PROCEDURE — U0003: CPT

## 2021-08-09 PROCEDURE — 97116 GAIT TRAINING THERAPY: CPT

## 2021-08-09 PROCEDURE — 80053 COMPREHEN METABOLIC PANEL: CPT

## 2021-08-09 PROCEDURE — 86769 SARS-COV-2 COVID-19 ANTIBODY: CPT

## 2021-08-09 PROCEDURE — 70450 CT HEAD/BRAIN W/O DYE: CPT

## 2021-08-09 PROCEDURE — 85025 COMPLETE CBC W/AUTO DIFF WBC: CPT

## 2021-08-09 PROCEDURE — 73551 X-RAY EXAM OF FEMUR 1: CPT

## 2021-08-09 PROCEDURE — 72146 MRI CHEST SPINE W/O DYE: CPT | Mod: MG

## 2021-08-09 PROCEDURE — 93005 ELECTROCARDIOGRAM TRACING: CPT

## 2021-08-09 PROCEDURE — 97163 PT EVAL HIGH COMPLEX 45 MIN: CPT

## 2021-08-09 PROCEDURE — G1004: CPT

## 2021-08-09 PROCEDURE — 84100 ASSAY OF PHOSPHORUS: CPT

## 2021-08-09 PROCEDURE — 83735 ASSAY OF MAGNESIUM: CPT

## 2021-08-09 PROCEDURE — 97110 THERAPEUTIC EXERCISES: CPT

## 2021-08-09 PROCEDURE — 83615 LACTATE (LD) (LDH) ENZYME: CPT

## 2021-08-09 RX ORDER — MIRTAZAPINE 45 MG/1
1 TABLET, ORALLY DISINTEGRATING ORAL
Qty: 0 | Refills: 0 | DISCHARGE
Start: 2021-08-09

## 2021-08-09 RX ORDER — MIRTAZAPINE 45 MG/1
1 TABLET, ORALLY DISINTEGRATING ORAL
Qty: 0 | Refills: 0 | DISCHARGE

## 2021-08-09 RX ORDER — ACETAMINOPHEN 500 MG
650 TABLET ORAL
Qty: 0 | Refills: 0 | DISCHARGE
Start: 2021-08-09

## 2021-08-09 RX ORDER — VALSARTAN 80 MG/1
1 TABLET ORAL
Qty: 0 | Refills: 0 | DISCHARGE

## 2021-08-09 RX ADMIN — Medication 650 MILLIGRAM(S): at 06:54

## 2021-08-09 RX ADMIN — VALSARTAN 160 MILLIGRAM(S): 80 TABLET ORAL at 05:24

## 2021-08-09 RX ADMIN — HEPARIN SODIUM 5000 UNIT(S): 5000 INJECTION INTRAVENOUS; SUBCUTANEOUS at 05:24

## 2021-08-09 RX ADMIN — Medication 650 MILLIGRAM(S): at 00:22

## 2021-08-09 RX ADMIN — LIDOCAINE 1 PATCH: 4 CREAM TOPICAL at 17:41

## 2021-08-09 RX ADMIN — Medication 650 MILLIGRAM(S): at 07:20

## 2021-08-09 NOTE — DISCHARGE NOTE NURSING/CASE MANAGEMENT/SOCIAL WORK - PATIENT PORTAL LINK FT
You can access the FollowMyHealth Patient Portal offered by [facility] by registering at the following website: [email or web address]. By joining [facility]’s FollowMyHealth portal, you will also be able to view your health information using other applications (apps) compatible with our system.

## 2021-08-09 NOTE — DISCHARGE NOTE PROVIDER - CARE PROVIDER_API CALL
Alpesh Galvan (DO)  Orthopaedic Surgery  [address] [place] [address], [address]  [place] [address]  Phone: [phone]  Fax: [phone]  Follow Up Time:

## 2021-08-09 NOTE — PROGRESS NOTE ADULT - ATTENDING COMMENTS
I have seen and examined the patient during rounds for 8-1030a  no new issues  pain under control  OK to d/c home when family arranges home care  DVT chemorphyalxys

## 2021-08-09 NOTE — PROGRESS NOTE ADULT - SUBJECTIVE AND OBJECTIVE BOX
INTERVAL HPI/OVERNIGHT EVENTS: still with same pain, still refusing meds except for occasional tylenol, BP somewhat high as per bedside RN, explained that most likely due to poor pain control       MEDICATIONS  (STANDING):  acetaminophen    Suspension .. 650 milliGRAM(s) Oral every 6 hours  bisacodyl 5 milliGRAM(s) Oral every 12 hours  heparin   Injectable 5000 Unit(s) SubCutaneous every 8 hours  lactulose Syrup 10 Gram(s) Oral daily  lidocaine   4% Patch 1 Patch Transdermal every 24 hours  melatonin 3 milliGRAM(s) Oral at bedtime  mirtazapine 15 milliGRAM(s) Oral at bedtime  OLANZapine 5 milliGRAM(s) Oral at bedtime  polyethylene glycol 3350 17 Gram(s) Oral daily  senna 2 Tablet(s) Oral at bedtime  valsartan 160 milliGRAM(s) Oral daily    MEDICATIONS  (PRN):      Vital Signs Last 24 Hrs  T(C): 36.7 (08 Aug 2021 23:20), Max: 36.7 (08 Aug 2021 07:32)  T(F): 98.1 (08 Aug 2021 23:20), Max: 98.1 (08 Aug 2021 23:20)  HR: 73 (08 Aug 2021 23:20) (65 - 91)  BP: 148/82 (08 Aug 2021 19:58) (148/82 - 183/98)  BP(mean): --  RR: 18 (08 Aug 2021 23:20) (16 - 18)  SpO2: 96% (08 Aug 2021 23:20) (93% - 98%)    PHYSICAL EXAM:      Constitutional: NAD    Respiratory: no accessory muscle use or conversational dyspnea    Cardiovascular: RRR    Gastrointestinal: soft, NT/ND    Extremities: HERNADEZ        I&O's Detail    07 Aug 2021 07:01  -  08 Aug 2021 07:00  --------------------------------------------------------  IN:    Lactated Ringers: 42 mL  Total IN: 42 mL    OUT:    Voided (mL): 700 mL  Total OUT: 700 mL    Total NET: -658 mL          LABS:                RADIOLOGY & ADDITIONAL STUDIES:

## 2021-08-09 NOTE — PROGRESS NOTE ADULT - NUTRITIONAL ASSESSMENT
This patient has been assessed with a concern for Malnutrition and has been determined to have a diagnosis/diagnoses of Severe protein-calorie malnutrition.    This patient is being managed with:   Diet DASH/TLC-  Sodium & Cholesterol Restricted  Entered: Aug  7 2021  8:40AM    
This patient has been assessed with a concern for Malnutrition and has been determined to have a diagnosis/diagnoses of Severe protein-calorie malnutrition.    This patient is being managed with:   Diet Regular-  DASH/TLC {Sodium & Cholesterol Restricted} (DASH)  Entered: Aug  1 2021  3:16PM    
This patient has been assessed with a concern for Malnutrition and has been determined to have a diagnosis/diagnoses of Severe protein-calorie malnutrition.    This patient is being managed with:   Diet DASH/TLC-  Sodium & Cholesterol Restricted  Entered: Aug  7 2021  8:40AM

## 2021-08-09 NOTE — PROGRESS NOTE ADULT - SUBJECTIVE AND OBJECTIVE BOX
[facility] PALLIATIVE MEDICINE FOLLOW UP VISIT    INTERVAL HPI/OVERNIGHT EVENTS:  Source if other than patient:  []Family   [x]Team     No acute events overnight.     Present Symptoms:   Dyspnea:  No   Nausea/Vomiting:  No  Anxiety:  Yes   Depression: Yes due to situation  Fatigue:  No  Loss of appetite: Yes    Pain: Yes chronic    Review of Systems: Reviewed, All others negative    MEDICATIONS  (STANDING):  acetaminophen    Suspension .. 650 milliGRAM(s) Oral every 6 hours  bisacodyl 5 milliGRAM(s) Oral every 12 hours  heparin   Injectable 5000 Unit(s) SubCutaneous every 8 hours  lactulose Syrup 10 Gram(s) Oral daily  lidocaine   4% Patch 1 Patch Transdermal every 24 hours  melatonin 3 milliGRAM(s) Oral at bedtime  mirtazapine 15 milliGRAM(s) Oral at bedtime  OLANZapine 5 milliGRAM(s) Oral at bedtime  polyethylene glycol 3350 17 Gram(s) Oral daily  senna 2 Tablet(s) Oral at bedtime  valsartan 160 milliGRAM(s) Oral daily    MEDICATIONS  (PRN):      PHYSICAL EXAM:    Vital Signs Last 24 Hrs  T(C): 36.8 (09 Aug 2021 05:12), Max: 36.8 (09 Aug 2021 05:12)  T(F): 98.3 (09 Aug 2021 05:12), Max: 98.3 (09 Aug 2021 05:12)  HR: 68 (09 Aug 2021 05:12) (68 - 79)  BP: 186/86 (09 Aug 2021 05:12) (148/82 - 186/86)  BP(mean): --  RR: 18 (09 Aug 2021 05:12) (17 - 18)  SpO2: 97% (09 Aug 2021 05:12) (94% - 97%)    Palliative Performance Status Version 2: 40%  http://npcrc.org/files/news/palliative_performance_scale_ppsv2.pdf    General: cachetic. Resting comfortably. No acute distress.   HEENT: MMM   Lungs: comfortable. non-labored.   CV: +s1/s2. rr    GI:+ bowel sound. abdomen soft, distended, nontender   MSK: Moves all 4 extremities.  No cyanosis. + edema. weakness.   Neuro: nonfocal. Awake and alert, orientedx3. Interactive.   Skin: warm and dry.    Psych: anxious    LABS: reviewed  RADIOLOGY & ADDITIONAL STUDIES: Reviewed     ADVANCE DIRECTIVES:   DNR YES NO  Completed on:                     MOLST  YES NO   Completed on:  Living Will  YES NO   Completed on:

## 2021-08-09 NOTE — DISCHARGE NOTE PROVIDER - NSDCCPTREATMENT_GEN_ALL_CORE_FT
PRINCIPAL PROCEDURE  Procedure: Closed treatment of vertebral body fracture  Findings and Treatment: Wound Assessment:   Assessment:  · Assessment	  Patient is A&Ox3, with documented history of urinary/fecal incontinence.  Current Samir score of 14.  Noted ecchymosis on all extremities with documented hematoma to Left Hip.    Right Heel - HEALED Stage II PI with intact yellow dry scab (0.2cm x0.2cm) with pink, blanching flaky periwound skin, no drainage noted  Left Anterior Shin Ulceration/Erosion likely from prior trauma.  2 sites noted with pink, fragile, intact periwound area surrounding them.  Site 1 Medial aspect: (1cm x 0.5) pink, white, moist, with minimal serosanguinous drainage   Site 2 Lateral aspect: (0.5cm x 0.2cm) white, moist with minimal serosanguinous drainage   Sacrum - DTI (02cm x 0.2cm) intact, persistent, nonblanching purple discoloration with intact periwound skin, no drainage    Recommendation:  · Recommendations	  Sacrum - Cavilon Skin Barrier - Daily  Right Heel - keep offloaded with Cair boot or pillows  Left Shin - cleanse with normal saline, Cavilon Advanced Q3days  Turn and Reposition Q2H while in bed  Offload patient's back to alternate side when repositioning with wedge/Z-maegan pillow

## 2021-08-09 NOTE — DISCHARGE NOTE NURSING/CASE MANAGEMENT/SOCIAL WORK - NSDCVIVACCINE_GEN_ALL_CORE_FT
Tdap; 14-Oct-2020 13:08; Dunphy, Rebecca M (RN); [employer]; [id number] (Exp. Date: 20-Jun-2022); IntraMuscular; Deltoid Left.; 0.5 milliLiter(s); VIS (VIS Published: 09-May-2013, VIS Presented: 14-Oct-2020);

## 2021-08-09 NOTE — DISCHARGE NOTE PROVIDER - HOSPITAL COURSE
86F PMHx HTN, MVP, dizziness presenting s/p ground level fall in shower on 8/1.  Pt. ambulates with a walker at baseline.  Pt. feels that her home medications have been causing her some dizziness.  Denies syncopal episodes.  On CT found to have T1, T3, T4 compression Fx's, spinous process Fx of T3,T7, T8, Left thigh non-expanding hematoma, scalp lac. repaired) and spinal hematoma vs. malignancy.  MRI done on 8/3/21 reveals pathologic fractures c/w metastasis.  Family and Dr. Galvan ( orthospine) decided on conservative management.  Palliative and heme-onc services involved in care.  Heme-Onc: Etiology of radiologically discovered mass not clear.  Given advanced age and poor PS/debilitation, not candidate for aggressive evaluation.  Will evaluate for blood dyscrasia.  Pt. Proxy (son Alexi) had several conversations with social work, palliative, nursing heme-onc.  Conservative management agreed upon with evaluation of oncologist that son has contacted.  (oncologist know to the family)  Pt. already with home health aid 7 days/week.  Pt. to continue care at home with Home care agency. 86F PMHx HTN, MVP, dizziness presenting s/p ground level fall in shower on 8/1.  Pt. ambulates with a walker at baseline.  Pt. feels that her home medications have been causing her some dizziness.  Denies syncopal episodes.  On CT found to have T1, T3, T4 compression Fx's, spinous process Fx of T3,T7, T8, Left thigh non-expanding hematoma, scalp lac. repaired) and spinal hematoma vs. malignancy.  MRI done on 8/3/21 reveals pathologic fractures c/w metastasis.  Family and Dr. Galvan ( orthospine) decided on conservative management.  Palliative and heme-onc services involved in care.  Heme-Onc: Etiology of radiologically discovered mass not clear.  Given advanced age and poor PS/debilitation, not candidate for aggressive evaluation.  Will evaluate for blood dyscrasia.  Pt. Proxy (son Alexi) had several conversations with social work, palliative, nursing heme-onc.  Conservative management agreed upon with evaluation of oncologist that son has contacted.  (oncologist know to the family)  Pt. already with home health aid 7 days/week.  Pt. to continue care at home with Home care agency.    Length of time preparing discharge > 30 minutes

## 2021-08-09 NOTE — PROGRESS NOTE ADULT - REASON FOR ADMISSION
S/p Fall

## 2021-08-09 NOTE — PROGRESS NOTE ADULT - PROVIDER SPECIALTY LIST ADULT
Trauma Surgery
Surgery
Trauma Surgery
Trauma Surgery
Orthopedics
Palliative Care
Surgery
Trauma Surgery
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
## 2021-08-09 NOTE — PROGRESS NOTE ADULT - NSICDXPILOT_GEN_ALL_CORE
[place]

## 2021-08-09 NOTE — DISCHARGE NOTE PROVIDER - NSDCMRMEDTOKEN_GEN_ALL_CORE_FT
Adoxa 100 mg oral tablet: 1 tab(s) orally 2 times a day   Biotin: 1 cap(s) orally once a day  irbesartan 150 mg oral tablet: 1 tab(s) orally once a day  mirtazapine 7.5 mg oral tablet: 1 tab(s) orally once a day (at bedtime)  mirtazapine 7.5 mg oral tablet: 1 tab(s) orally once a day (at bedtime)  Multiple Vitamins oral tablet: 1 tab(s) orally once a day  OLANZapine 5 mg oral tablet: 1 tab(s) orally once a day (at bedtime)  OLANZapine 5 mg oral tablet, disintegratin tab(s) orally once a day (at bedtime)  valsartan 160 mg oral tablet: 1 tab(s) orally once a day   acetaminophen: 650 milligram(s) orally every 6 hours, As Needed  Biotin: 1 cap(s) orally once a day  irbesartan 150 mg oral tablet: 1 tab(s) orally once a day  mirtazapine 15 mg oral tablet: 1 tab(s) orally once a day (at bedtime)  Multiple Vitamins oral tablet: 1 tab(s) orally once a day  OLANZapine 5 mg oral tablet: 1 tab(s) orally once a day (at bedtime)  OLANZapine 5 mg oral tablet, disintegratin tab(s) orally once a day (at bedtime)

## 2021-08-09 NOTE — DISCHARGE NOTE PROVIDER - DETAILS OF MALNUTRITION DIAGNOSIS/DIAGNOSES
This patient has been assessed with a concern for Malnutrition and was treated during this hospitalization for the following Nutrition diagnosis/diagnoses:     -  08/06/2021: Severe protein-calorie malnutrition

## 2021-08-10 NOTE — CDI QUERY NOTE - NSCDIOTHERTXTBX_GEN_ALL_CORE_HH
There is conflicting documentation regarding the patient's nutritional status.  The patient received a nutrition assessment by a Registered Dietician on 8/6/21 .  The documentation of this assessment states: Severe protein-calorie malnutrition. At the same time, a chart documentation also states the patient is well nourished.  Can the nutrition diagnosis be clarified and please clarify the supporting treatment for this patients malnutrition.    A.  Severe protein-calorie malnutrition as evidenced by please also refer to the Dietician Assessment for further details, (specify treatment provided).   B. Other (please specify)  C. Not clinically significant    Chart Documentation:    H&P Adult:  Physical Exam: Constitutional: Well-developed well nourished      8/6/21 Dietitian Nutrition Risk Notification:  Upon Nutritional Assessment by the Registered Dietitian Your Patient was Determined to Meet Criteria/Has Evidence of the Following Diagnosis:	Severe protein-calorie malnutrition  Other Risk Factors	Not applicable  Etiology-Basis	Chronic illness  Malnutrition Evaluation as Demonstrated by (Adults > 20 years of age)	Inadequate energy intake...  Loss of subcutaneous fat...  Loss of muscle...  Inadequate Energy Intake	< 75% for >/= 1 month  Body Fat (loss of subcutaneous fat)	Orbital...  Fat overlying ribcage...  Buccal...  Orbital Depletion is	severe  Fat Overlying Ribcage Depletion is	severe  Buccal Depletion is	severe  Muscle Mass (Loss of Muscle)	Temples...  Clavicles...  Shoulders...  Temples Depletion is	severe  Clavicles Depletion is	severe  Shoulders Depletion is	severe

## 2021-08-10 NOTE — CHART NOTE - NSCHARTNOTEFT_GEN_A_CORE
Please note upon review of chart the followin.  Severe protein-calorie malnutrition as evidenced by please also refer to the Dietician Assessment for further details    Chart Documentation:    H&P Adult:  Physical Exam: Constitutional: Well-developed well nourished      21 Dietitian Nutrition Risk Notification:  Upon Nutritional Assessment by the Registered Dietitian Your Patient was Determined to Meet Criteria/Has Evidence of the Following Diagnosis:	Severe protein-calorie malnutrition  Other Risk Factors	Not applicable  Etiology-Basis	Chronic illness  Malnutrition Evaluation as Demonstrated by (Adults > 20 years of age)	Inadequate energy intake...  Loss of subcutaneous fat...  Loss of muscle...  Inadequate Energy Intake	< 75% for >/= 1 month  Body Fat (loss of subcutaneous fat)	Orbital...  Fat overlying ribcage...  Buccal...  Orbital Depletion is	severe  Fat Overlying Ribcage Depletion is	severe  Buccal Depletion is	severe  Muscle Mass (Loss of Muscle)	Temples...  Clavicles...  Shoulders...  Temples Depletion is	severe  Clavicles Depletion is	severe  Shoulders Depletion is	severe. Please note upon review of chart the followin.  Severe protein-calorie malnutrition as evidenced by please also refer to the Dietician Assessment for further details    Chart Documentation:    21 Dietitian Nutrition Risk Notification:  Upon Nutritional Assessment by the Registered Dietitian Your Patient was Determined to Meet Criteria/Has Evidence of the Following Diagnosis:	Severe protein-calorie malnutrition  Other Risk Factors	Not applicable  Etiology-Basis	Chronic illness  Malnutrition Evaluation as Demonstrated by (Adults > 20 years of age)	Inadequate energy intake...  Loss of subcutaneous fat...  Loss of muscle...  Inadequate Energy Intake	< 75% for >/= 1 month  Body Fat (loss of subcutaneous fat)	Orbital...  Fat overlying ribcage...  Buccal...  Orbital Depletion is	severe  Fat Overlying Ribcage Depletion is	severe  Buccal Depletion is	severe  Muscle Mass (Loss of Muscle)	Temples...  Clavicles...  Shoulders...  Temples Depletion is	severe  Clavicles Depletion is	severe  Shoulders Depletion is	severe.

## 2021-08-13 LAB
% ALBUMIN: SIGNIFICANT CHANGE UP %
% ALPHA 1: SIGNIFICANT CHANGE UP %
% ALPHA 2: SIGNIFICANT CHANGE UP %
% BETA: SIGNIFICANT CHANGE UP %
% GAMMA: SIGNIFICANT CHANGE UP %
ALBUMIN SERPL ELPH-MCNC: SIGNIFICANT CHANGE UP G/DL (ref 3.6–5.5)
ALPHA1 GLOB SERPL ELPH-MCNC: SIGNIFICANT CHANGE UP G/DL (ref 0.1–0.4)
ALPHA2 GLOB SERPL ELPH-MCNC: SIGNIFICANT CHANGE UP G/DL (ref 0.5–1)
B-GLOBULIN SERPL ELPH-MCNC: SIGNIFICANT CHANGE UP G/DL (ref 0.5–1)
GAMMA GLOBULIN: SIGNIFICANT CHANGE UP G/DL (ref 0.6–1.6)
INTERPRETATION SERPL IFE-IMP: SIGNIFICANT CHANGE UP
PROT SERPL-MCNC: SIGNIFICANT CHANGE UP (ref 6–8.3)
PROT SERPL-MCNC: SIGNIFICANT CHANGE UP G/DL (ref 6–8.3)

## 2021-10-16 ENCOUNTER — TRANSCRIPTION ENCOUNTER (OUTPATIENT)
Age: 86
End: 2021-10-16

## 2021-10-17 ENCOUNTER — EMERGENCY (EMERGENCY)
Facility: HOSPITAL | Age: 86
LOS: 0 days | Discharge: HOME | End: 2021-10-17
Attending: EMERGENCY MEDICINE
Payer: MEDICARE

## 2021-10-17 VITALS
RESPIRATION RATE: 16 BRPM | HEART RATE: 89 BPM | TEMPERATURE: 98 F | OXYGEN SATURATION: 96 % | DIASTOLIC BLOOD PRESSURE: 81 MMHG | SYSTOLIC BLOOD PRESSURE: 146 MMHG

## 2021-10-17 VITALS — OXYGEN SATURATION: 95 % | RESPIRATION RATE: 18 BRPM | HEART RATE: 88 BPM | HEIGHT: 60 IN | WEIGHT: 82.01 LBS

## 2021-10-17 DIAGNOSIS — Z88.2 ALLERGY STATUS TO SULFONAMIDES: ICD-10-CM

## 2021-10-17 DIAGNOSIS — I10 ESSENTIAL (PRIMARY) HYPERTENSION: ICD-10-CM

## 2021-10-17 DIAGNOSIS — M79.89 OTHER SPECIFIED SOFT TISSUE DISORDERS: ICD-10-CM

## 2021-10-17 DIAGNOSIS — Z88.8 ALLERGY STATUS TO OTHER DRUGS, MEDICAMENTS AND BIOLOGICAL SUBSTANCES: ICD-10-CM

## 2021-10-17 DIAGNOSIS — Z88.0 ALLERGY STATUS TO PENICILLIN: ICD-10-CM

## 2021-10-17 DIAGNOSIS — R07.89 OTHER CHEST PAIN: ICD-10-CM

## 2021-10-17 PROBLEM — R42 DIZZINESS AND GIDDINESS: Chronic | Status: ACTIVE | Noted: 2021-08-01

## 2021-10-17 PROBLEM — I34.1 NONRHEUMATIC MITRAL (VALVE) PROLAPSE: Chronic | Status: ACTIVE | Noted: 2021-08-01

## 2021-10-17 LAB
ALBUMIN SERPL ELPH-MCNC: 3.3 G/DL — SIGNIFICANT CHANGE UP (ref 3.3–5)
ALP SERPL-CCNC: 84 U/L — SIGNIFICANT CHANGE UP (ref 40–120)
ALT FLD-CCNC: 28 U/L — SIGNIFICANT CHANGE UP (ref 12–78)
ANION GAP SERPL CALC-SCNC: 4 MMOL/L — LOW (ref 5–17)
APTT BLD: 32.6 SEC — SIGNIFICANT CHANGE UP (ref 27.5–35.5)
AST SERPL-CCNC: 28 U/L — SIGNIFICANT CHANGE UP (ref 15–37)
BASOPHILS # BLD AUTO: 0.05 K/UL — SIGNIFICANT CHANGE UP (ref 0–0.2)
BASOPHILS NFR BLD AUTO: 1 % — SIGNIFICANT CHANGE UP (ref 0–2)
BILIRUB SERPL-MCNC: 0.2 MG/DL — SIGNIFICANT CHANGE UP (ref 0.2–1.2)
BUN SERPL-MCNC: 32 MG/DL — HIGH (ref 7–23)
CALCIUM SERPL-MCNC: 9.2 MG/DL — SIGNIFICANT CHANGE UP (ref 8.5–10.1)
CHLORIDE SERPL-SCNC: 106 MMOL/L — SIGNIFICANT CHANGE UP (ref 96–108)
CO2 SERPL-SCNC: 30 MMOL/L — SIGNIFICANT CHANGE UP (ref 22–31)
CREAT SERPL-MCNC: 0.98 MG/DL — SIGNIFICANT CHANGE UP (ref 0.5–1.3)
D DIMER BLD IA.RAPID-MCNC: 616 NG/ML DDU — HIGH
EOSINOPHIL # BLD AUTO: 0.02 K/UL — SIGNIFICANT CHANGE UP (ref 0–0.5)
EOSINOPHIL NFR BLD AUTO: 0.4 % — SIGNIFICANT CHANGE UP (ref 0–6)
GLUCOSE SERPL-MCNC: 101 MG/DL — HIGH (ref 70–99)
HCT VFR BLD CALC: 32.8 % — LOW (ref 34.5–45)
HGB BLD-MCNC: 10 G/DL — LOW (ref 11.5–15.5)
IMM GRANULOCYTES NFR BLD AUTO: 0.6 % — SIGNIFICANT CHANGE UP (ref 0–1.5)
INR BLD: 0.98 RATIO — SIGNIFICANT CHANGE UP (ref 0.88–1.16)
LYMPHOCYTES # BLD AUTO: 1.05 K/UL — SIGNIFICANT CHANGE UP (ref 1–3.3)
LYMPHOCYTES # BLD AUTO: 21 % — SIGNIFICANT CHANGE UP (ref 13–44)
MAGNESIUM SERPL-MCNC: 2.5 MG/DL — SIGNIFICANT CHANGE UP (ref 1.6–2.6)
MCHC RBC-ENTMCNC: 29.6 PG — SIGNIFICANT CHANGE UP (ref 27–34)
MCHC RBC-ENTMCNC: 30.5 GM/DL — LOW (ref 32–36)
MCV RBC AUTO: 97 FL — SIGNIFICANT CHANGE UP (ref 80–100)
MONOCYTES # BLD AUTO: 0.69 K/UL — SIGNIFICANT CHANGE UP (ref 0–0.9)
MONOCYTES NFR BLD AUTO: 13.8 % — SIGNIFICANT CHANGE UP (ref 2–14)
NEUTROPHILS # BLD AUTO: 3.15 K/UL — SIGNIFICANT CHANGE UP (ref 1.8–7.4)
NEUTROPHILS NFR BLD AUTO: 63.2 % — SIGNIFICANT CHANGE UP (ref 43–77)
NT-PROBNP SERPL-SCNC: 275 PG/ML — SIGNIFICANT CHANGE UP (ref 0–450)
PLATELET # BLD AUTO: 152 K/UL — SIGNIFICANT CHANGE UP (ref 150–400)
POTASSIUM SERPL-MCNC: 4.2 MMOL/L — SIGNIFICANT CHANGE UP (ref 3.5–5.3)
POTASSIUM SERPL-SCNC: 4.2 MMOL/L — SIGNIFICANT CHANGE UP (ref 3.5–5.3)
PROT SERPL-MCNC: 6.8 GM/DL — SIGNIFICANT CHANGE UP (ref 6–8.3)
PROTHROM AB SERPL-ACNC: 11.5 SEC — SIGNIFICANT CHANGE UP (ref 10.6–13.6)
RBC # BLD: 3.38 M/UL — LOW (ref 3.8–5.2)
RBC # FLD: 14.1 % — SIGNIFICANT CHANGE UP (ref 10.3–14.5)
SODIUM SERPL-SCNC: 140 MMOL/L — SIGNIFICANT CHANGE UP (ref 135–145)
TROPONIN I, HIGH SENSITIVITY RESULT: 11.31 NG/L — SIGNIFICANT CHANGE UP
WBC # BLD: 4.99 K/UL — SIGNIFICANT CHANGE UP (ref 3.8–10.5)
WBC # FLD AUTO: 4.99 K/UL — SIGNIFICANT CHANGE UP (ref 3.8–10.5)

## 2021-10-17 PROCEDURE — 93005 ELECTROCARDIOGRAM TRACING: CPT

## 2021-10-17 PROCEDURE — 83735 ASSAY OF MAGNESIUM: CPT

## 2021-10-17 PROCEDURE — U0003: CPT

## 2021-10-17 PROCEDURE — 85379 FIBRIN DEGRADATION QUANT: CPT

## 2021-10-17 PROCEDURE — 71275 CT ANGIOGRAPHY CHEST: CPT | Mod: 26,MA

## 2021-10-17 PROCEDURE — 83880 ASSAY OF NATRIURETIC PEPTIDE: CPT

## 2021-10-17 PROCEDURE — U0005: CPT

## 2021-10-17 PROCEDURE — 85730 THROMBOPLASTIN TIME PARTIAL: CPT

## 2021-10-17 PROCEDURE — 36415 COLL VENOUS BLD VENIPUNCTURE: CPT

## 2021-10-17 PROCEDURE — 99284 EMERGENCY DEPT VISIT MOD MDM: CPT | Mod: 25

## 2021-10-17 PROCEDURE — 71045 X-RAY EXAM CHEST 1 VIEW: CPT | Mod: 26

## 2021-10-17 PROCEDURE — 71275 CT ANGIOGRAPHY CHEST: CPT | Mod: MA

## 2021-10-17 PROCEDURE — 84484 ASSAY OF TROPONIN QUANT: CPT

## 2021-10-17 PROCEDURE — 71045 X-RAY EXAM CHEST 1 VIEW: CPT

## 2021-10-17 PROCEDURE — 85610 PROTHROMBIN TIME: CPT

## 2021-10-17 PROCEDURE — 93010 ELECTROCARDIOGRAM REPORT: CPT

## 2021-10-17 PROCEDURE — 80053 COMPREHEN METABOLIC PANEL: CPT

## 2021-10-17 PROCEDURE — 99285 EMERGENCY DEPT VISIT HI MDM: CPT | Mod: CS

## 2021-10-17 PROCEDURE — 85025 COMPLETE CBC W/AUTO DIFF WBC: CPT

## 2021-10-17 NOTE — ED PROVIDER NOTE - CLINICAL SUMMARY MEDICAL DECISION MAKING FREE TEXT BOX
85 y/o female with PMHx of HTN p/w left -sided pleuritic chest pain x3 weeks. Will check labs, EKG, CXR, and reassess.

## 2021-10-17 NOTE — ED PROVIDER NOTE - CARDIAC, MLM
Normal rate, regular rhythm.  Heart sounds S1, S2.  No murmurs, rubs or gallops. +Left lateral chest wall TTP. 1+ pitting edema bilateral LE

## 2021-10-17 NOTE — ED PROVIDER NOTE - OBJECTIVE STATEMENT
85 y/o female with PMHx of depression, HTN, MVP presents to the ED c/o left-sided chest pain x3 weeks. Pt reports she has had left-sided chest pain, mild to moderate in intensity, which is worse when taking in deep breaths. Has no radiation of pain. Also reports some mild bilateral LE swelling. States she used to be on Lasix, but is not on it anymore. Denies fevers, chills, SOB, nausea, vomiting, diarrhea, abdominal pain, headache, dizziness, or other complaints at this time. Allergic to Gantrisin, Penicillins.

## 2021-10-17 NOTE — ED PROVIDER NOTE - PROGRESS NOTE DETAILS
Age-Adjusted D-dimer for Venous Thromboembolism (VTE) from [email or web address]  on 10/17/2021  RESULT SUMMARY:  420 µg/L (Age-adjusted D-dimer cutoff, DDU)    **VTE possible**     Reported D-dimer is greater than cutoff; consider confirmatory testing with CTA or V/Q scan    INPUTS: Age —> 84 years / D-dimer level reported by lab —> 616 µg/L / D-dimer unit type —> 2 = DDU (unadjusted cutoff typically 230-250 or 0.23-0.25) Results reviewed and discussed with pt. Pt with non concerning story for acs, low risk heart score, suspect msk strain, recommend Tylenol for pain and cardiology fu. Discussed importance of close FU with PMD. Pt asked to return to ED immediately for any new or concerning sx or worsening. Pt acknowledges and understands plan -Francisco To PA-C 87 y/o F presents with CPx 3 weeks. Pt reports L sided CP mild-moderate in intensity that does not radiate. Mild B/L LE edema, she use to be on lasix. Denies fever/chills, n/v/d, SOB. abd pain, HA dizziness or other complaints at [facility]. -Francisco To PA-C

## 2021-10-17 NOTE — ED PROVIDER NOTE - NSFOLLOWUPINSTRUCTIONS_ED_ALL_ED_FT
Follow-up with your regular physician  Return with any persistent/worsening symptoms.     Chest Pain    Chest pain can be caused by many different conditions which may or may not be dangerous. Causes include heartburn, lung infections, heart attack, blood clot in lungs, skin infections, strain or damage to muscle, cartilage, or bones, etc. In addition to a history and physical examination, an electrocardiogram (ECG) or other lab tests may have been performed to determine the cause of your chest pain. Follow up with your primary care provider or with a cardiologist as instructed.     SEEK IMMEDIATE MEDICAL CARE IF YOU HAVE ANY OF THE FOLLOWING SYMPTOMS: worsening chest pain, coughing up blood, unexplained back/neck/jaw pain, severe abdominal pain, dizziness or lightheadedness, fainting, shortness of breath, sweaty or clammy skin, vomiting, or racing heart beat. These symptoms may represent a serious problem that is an emergency. Do not wait to see if the symptoms will go away. Get medical help right away. Call 911 and do not drive yourself to the hospital.

## 2021-10-17 NOTE — ED PROVIDER NOTE - PATIENT PORTAL LINK FT
You can access the FollowMyHealth Patient Portal offered by [facility] by registering at the following website: [email or web address]. By joining [facility]’s FollowMyHealth portal, you will also be able to view your health information using other applications (apps) compatible with our system.

## 2021-10-17 NOTE — ED ADULT NURSE NOTE - OBJECTIVE STATEMENT
pt presents to ED with complaints of chest pain and SOB. pt states pain and SOB started a few days ago and worsens with inspiration. pt states she went to Dr. Ramirez and he sent them home. today, pt feels like pain got worse and came to the ED. denies fevers, chills.

## 2021-10-17 NOTE — ED PROVIDER NOTE - CHPI ED SYMPTOMS NEG
no diarrhea, no abdominal pain, no headache/no dizziness/no fever/no nausea/no shortness of breath/no vomiting/no chills

## 2021-10-17 NOTE — ED PROVIDER NOTE - ATTENDING CONTRIBUTION TO CARE
I personally saw the patient with the ACP, and completed the key components of the history and physical exam.  I then discussed the management plan with the ACP.     86F hx HTN, MVP c/o left chest pain x 3wks, worse with deep inspiration. Mild leg swelling.  Plan: XR, labs, ECG, trop, reassess

## 2021-10-17 NOTE — ED STATDOCS - PROGRESS NOTE DETAILS
Renetta Mcmaohn: 87 y/o F with a PMHx of MVP, fecal impaction, hemorrhoids, HTN presents to the ED c/o CP x few days and chest congestion and trouble breathing x 3 weeks.  Pt states she went to  and was sent to the ED for a further evaluation.  States she walks with a walker at baseline. Pfizer COVID vaccinated.  Will send pt to main ED for further evaluation.

## 2021-10-17 NOTE — ED ADULT TRIAGE NOTE - CHIEF COMPLAINT QUOTE
pt presents to ED with complaints of chest pain and SOB. pt states pain and SOB started a few days ago and worsens with inspiration. pt states she went to urgent care and was sent to Ed for further eval. pt denies recent falls.

## 2022-02-18 PROCEDURE — 98968 PH1 ASSMT&MGMT NQHP 21-30: CPT | Mod: CR

## 2022-03-24 NOTE — DISCHARGE NOTE PROVIDER - NSDCMRMEDTOKEN_GEN_ALL_CORE_FT
[FreeTextEntry1] : Discussed again with the patient exam history and prior imaging at this time patient elects physical therapy and home exercises.  If any increased pain or any onset of weakness her concern is to call the office are present or emergency room Adoxa 100 mg oral tablet: 1 tab(s) orally 2 times a day   Biotin: 1 cap(s) orally once a day  irbesartan 150 mg oral tablet: 1 tab(s) orally once a day  mirtazapine 7.5 mg oral tablet: 1 tab(s) orally once a day (at bedtime)  Multiple Vitamins oral tablet: 1 tab(s) orally once a day  OLANZapine 5 mg oral tablet, disintegratin tab(s) orally once a day (at bedtime)

## 2022-03-30 NOTE — PATIENT PROFILE ADULT - CENTRAL VENOUS CATHETER/PICC LINE
Detail Level: Simple
Render Risk Assessment In Note?: no
Comment: Normal variant. Patient will monitor area.
no

## 2022-04-07 ENCOUNTER — OUTPATIENT (OUTPATIENT)
Dept: OUTPATIENT SERVICES | Facility: HOSPITAL | Age: 87
LOS: 1 days | End: 2022-04-07
Payer: MEDICARE

## 2022-04-07 DIAGNOSIS — L97.522 NON-PRESSURE CHRONIC ULCER OF OTHER PART OF LEFT FOOT WITH FAT LAYER EXPOSED: ICD-10-CM

## 2022-04-07 PROCEDURE — G0463: CPT

## 2022-04-21 ENCOUNTER — OUTPATIENT (OUTPATIENT)
Dept: OUTPATIENT SERVICES | Facility: HOSPITAL | Age: 87
LOS: 1 days | End: 2022-04-21
Payer: MEDICARE

## 2022-04-21 DIAGNOSIS — L97.522 NON-PRESSURE CHRONIC ULCER OF OTHER PART OF LEFT FOOT WITH FAT LAYER EXPOSED: ICD-10-CM

## 2022-04-21 DIAGNOSIS — L89.159 PRESSURE ULCER OF SACRAL REGION, UNSPECIFIED STAGE: ICD-10-CM

## 2022-04-21 PROCEDURE — G0463: CPT

## 2022-04-26 NOTE — DIETITIAN NUTRITION RISK NOTIFICATION - ADDITIONAL COMMENTS/DIETITIAN RECOMMENDATIONS
Continue diet as tolerated.   Add Ensure Enlive TID to optimize po intake and provide an additional 350 kcal, 20g protein per serving.  Rx: MVI and vitamin C 500mg daily.   Continue Remeron to potentially increase appetite.   Continue bowel regimen PRN.   Encourage po intake, monitor diet tolerance, and provide assistance at meals as needed.   Obtain daily weights to monitor trends.   Patent

## 2022-07-28 ENCOUNTER — OUTPATIENT (OUTPATIENT)
Dept: OUTPATIENT SERVICES | Facility: HOSPITAL | Age: 87
LOS: 1 days | End: 2022-07-28
Payer: MEDICARE

## 2022-07-28 DIAGNOSIS — L89.152 PRESSURE ULCER OF SACRAL REGION, STAGE 2: ICD-10-CM

## 2022-07-28 DIAGNOSIS — L97.522 NON-PRESSURE CHRONIC ULCER OF OTHER PART OF LEFT FOOT WITH FAT LAYER EXPOSED: ICD-10-CM

## 2022-07-28 PROCEDURE — 99213 OFFICE O/P EST LOW 20 MIN: CPT

## 2022-08-18 ENCOUNTER — OUTPATIENT (OUTPATIENT)
Dept: OUTPATIENT SERVICES | Facility: HOSPITAL | Age: 87
LOS: 1 days | End: 2022-08-18
Payer: MEDICARE

## 2022-08-18 DIAGNOSIS — L89.152 PRESSURE ULCER OF SACRAL REGION, STAGE 2: ICD-10-CM

## 2022-08-18 DIAGNOSIS — L97.522 NON-PRESSURE CHRONIC ULCER OF OTHER PART OF LEFT FOOT WITH FAT LAYER EXPOSED: ICD-10-CM

## 2022-08-18 PROCEDURE — 99212 OFFICE O/P EST SF 10 MIN: CPT

## 2022-08-28 NOTE — ED ADULT NURSE NOTE - CAS ELECT INFOMATION PROVIDED
Physical Medicine & Rehabilitation  Progress Note      Subjective:      Tre Picktet is a 63 y.o. male with right below-knee amputation. He reports doing fine today. He notes only intermittent pain in the right residual limb. He states that his back feels okay. He denies any other acute concerns. ROS:  Denies fevers, chills, sweats. No chest pain, palpitations, lightheadedness. Denies coughing, wheezing or shortness of breath. Denies abdominal pain, nausea, diarrhea or constipation. No new areas of joint pain. Denies new areas of numbness or weakness. Denies new anxiety or depression issues. No new skin problems. Rehabilitation:     PT    Restrictions/Precautions: Up as Tolerated, Weight Bearing  Other position/activity restrictions: up with assistance. R below knee amputation 8/8/22. Revision 8/12/22. Right Lower Extremity Weight Bearing: Non Weight Bearing    Bed mobility  Rolling to Left: Supervision  Rolling to Right: Supervision  Supine to Sit: Stand by assistance (vc's for hand placement with transfer)  Sit to Supine: Supervision  Scooting: Stand by assistance (hips to EOM)  Bed Mobility Comments: completed on flat mat with 2 pillows and also in room with hospital bed positioned flat    Transfers  Sit to Stand: Contact guard assistance  Stand to sit: Minimal Assistance  Bed to Chair: Contact guard assistance (w/RW)  Stand Pivot Transfers: Contact guard assistance (w/RW)  Comment: Improved hand placement noted with transfers.     Ambulation  Surface: level tile  Device: Rolling Walker  Other Apparatus: Wheelchair follow  Assistance: Contact guard assistance  Quality of Gait: flexed posture, short shuffling hops, fatigues quickly  Gait Deviations: Slow Vanessa, Decreased step height, Decreased step length  Distance: 14ft x 1, 8ft x 1  More Ambulation?: Yes      OT    ADL  Feeding: Setup  Grooming: Supervision  UE Bathing: Stand by assistance  UE Bathing Skilled Clinical Factors: while supine with HOB elevated  LE Bathing: Moderate assistance  LE Bathing Skilled Clinical Factors: assist for B lower legs and L foot  UE Dressing: Minimal assistance  UE Dressing Skilled Clinical Factors: assist to pull down shirt in back  LE Dressing: Maximum assistance  LE Dressing Skilled Clinical Factors: assist to thread B LE into underwear and shorts; SBA to pull over hips. Assist for L LE FELECIA hose, sock and R LE  and limb protector  Toileting: Stand by assistance  Toileting Skilled Clinical Factors: with use of urinal only this date. Patient declines use of toilet - states he does not need to have BM  Additional Comments: OT facilitated patient engagement in self-care routine while supine with HOB elevated for just-right challenge as patient reports 10/10 low back pain while seated in w/c and requests to return to bed. Patient agreeable to engage in self-care while supine. During lower body bathing and dressing R LE limb protector and  removed for skin check. LPN Shannon Alvarado notified and LPN and Dr. Kirstie King enter room for assessment of limb. Balance  Sitting Balance: Contact guard assistance  Standing Balance: Minimal assistance (in [place] this date due to significant fatigue/pain at start of session)           Transfers  Stand Pivot Transfers: Dependent/Total  Sit to stand: Moderate assistance  Stand to sit: Moderate assistance  Transfer Comments: with use of [place] this date during OT session. Pillow placed in front of R LE for protection on [place]  [employer] Transfers Comments: Patient declines use of toilet this date. Shower Transfers  Shower Transfers Comments: Patient declines use of shower this date. SPEECH:  Subjective: [x] Alert     [x] Cooperative     [] Confused     [] Agitated    [] Lethargic     Objective/Assessment:  Attention: Sustained t/o     Orientation: Orientation log administered, O-Log score- 30/30 (oriented to all concepts).       Recall: Image retention (fishing, 15 min delay)- 80% accuracy (I), 90% cued. Organization: n/a     Problem Solving/Reasoning: Deduction puzzle x2- Both completed with min-mod A. Pt. unable to complete task independently. Other: No family present.        Current Medications:   Current Facility-Administered Medications: gabapentin (NEURONTIN) capsule 300 mg, 300 mg, Oral, TID  lidocaine 4 % external patch 1 patch, 1 patch, TransDERmal, Daily  acetaminophen (TYLENOL) tablet 1,000 mg, 1,000 mg, Oral, 3 times per day  oxyCODONE (ROXICODONE) immediate release tablet 5 mg, 5 mg, Oral, Q4H PRN  atorvastatin (LIPITOR) tablet 20 mg, 20 mg, Oral, Nightly  aspirin EC tablet 81 mg, 81 mg, Oral, Daily  metoprolol succinate (TOPROL XL) extended release tablet 100 mg, 100 mg, Oral, Daily  lisinopril (PRINIVIL;ZESTRIL) tablet 40 mg, 40 mg, Oral, Daily  insulin lispro (HUMALOG) injection vial 0-8 Units, 0-8 Units, SubCUTAneous, TID WC  insulin lispro (HUMALOG) injection vial 0-4 Units, 0-4 Units, SubCUTAneous, Nightly  glucose chewable tablet 16 g, 4 tablet, Oral, PRN  dextrose bolus 10% 125 mL, 125 mL, IntraVENous, PRN **OR** dextrose bolus 10% 250 mL, 250 mL, IntraVENous, PRN  glucagon (rDNA) injection 1 mg, 1 mg, SubCUTAneous, PRN  dextrose 10 % infusion, , IntraVENous, Continuous PRN  ipratropium-albuterol (DUONEB) nebulizer solution 1 ampule, 1 ampule, Inhalation, Q4H WA  furosemide (LASIX) tablet 20 mg, 20 mg, Oral, Daily  metFORMIN (GLUCOPHAGE) tablet 500 mg, 500 mg, Oral, BID WC  insulin glargine (LANTUS) injection vial 20 Units, 20 Units, SubCUTAneous, Nightly  potassium chloride (KLOR-CON M) extended release tablet 40 mEq, 40 mEq, Oral, Daily  potassium chloride 10 mEq/100 mL IVPB (Peripheral Line), 10 mEq, IntraVENous, PRN  famotidine (PEPCID) tablet 20 mg, 20 mg, Oral, BID  guaiFENesin (MUCINEX) extended release tablet 1,200 mg, 1,200 mg, Oral, BID  polyethylene glycol (GLYCOLAX) packet 17 g, 17 g, Oral, Daily  senna (SENOKOT) tablet 17.2 mg, 2 tablet, Oral, Daily PRN  bisacodyl (DULCOLAX) suppository 10 mg, 10 mg, Rectal, Daily PRN  enoxaparin Sodium (LOVENOX) injection 30 mg, 30 mg, SubCUTAneous, BID      Objective:  /86   Pulse 81   Temp 97.7 °F (36.5 °C) (Oral)   Resp 18   Ht 5' 11\" (1.803 m)   Wt 231 lb 14.8 oz (105.2 kg)   SpO2 92%   BMI 32.35 kg/m²       GEN: Well developed, well nourished, no acute distress  HEENT: NCAT. EOM grossly intact. Mildly hard of hearing. Mucous membranes pink and moist.  RESP: Normal breath sounds with no wheezing, rales, or rhonchi. Respirations WNL and unlabored. CV: Regular rate and rhythm. No murmurs, rubs, or gallops. ABD: Soft, non-distended, BS+ and equal.  NEURO: Alert. Speech fluent. MSK:  Muscle tone and bulk are normal bilaterally. Has at least antigravity strength in SKIN: Warm and dry with good turgor. PSYCH: Mood WNL. Affect WNL. Appropriately interactive. Diagnostics:     CBC: No results for input(s): WBC, RBC, HGB, HCT, MCV, RDW, PLT in the last 72 hours. BMP: No results for input(s): NA, K, CL, CO2, PHOS, BUN, CREATININE, CA, GLUCOSE in the last 72 hours. BNP: No results for input(s): BNP in the last 72 hours. PT/INR: No results for input(s): PROTIME, INR in the last 72 hours. APTT: No results for input(s): APTT in the last 72 hours. CARDIAC ENZYMES: No results for input(s): CKMB, CKMBINDEX, TROPONINT in the last 72 hours. Invalid input(s): CKTOTAL;3  FASTING LIPID PANEL:No results found for: CHOL, HDL, TRIG  LIVER PROFILE: No results for input(s): AST, ALT, ALB, BILIDIR, BILITOT, ALKPHOS in the last 72 hours. Impression/Plan:   Impaired ADLs, gait, and mobility due to:    Right transtibial amputation/BKA for osteomyelitis/gangrene:  PT/OT for gait, mobility, strengthening, endurance, ADLs, and self care. Off antibiotics now. Pain control with scheduled tylenol, gabapentin, as-needed oxycodone.   HTN/HLD: On atorvastatin, lasix, lisinopril, metoprolol  Fall: Occurred 8/23 with no new injury - was wearing residual limb hard shell protector  Impaired cognition/memory: SLP treating. May need outpatient follow up with neurology. DM: On Lantus, Metformin, sliding scale  COPD: Has guaifenesin BID, Duonebs while awake. Patient was coughing with speech therapy - Had MBSS 8/26 and passed for regular diet with thin liquids. Chronic heart failure with reduced EF/frequent PVCs, history of CABG: On beta blocker - Toprol, lisinopril, lasix, aspirin, atorvastatin  History of AAA and right femoral-popliteal aneurysm: Being monitored as outpatient. No current indication for full anticoagulation as per Internal Medicine  Chronic radicular back pain: Has Lidoderm patch and scheduled tylenol. Gabapentin started 8/21 and titrating up to effective dose as tolerated - increased to 300 mg TID on 8/25. Therapy will consider including TENS unit in treatment. GERD: On famotidine  Moderate protein calorie malnutrition: BMI 32.53. Dietitian following  Bowel Management: Miralax daily, senokot prn, dulcolax prn.   DVT Prophylaxis:  low molecular weight heparin, SCD's while in bed, and [facility]'s   Internal medicine for medical management  Follow up PCP 1-2 weeks, Dr. Rosalie Maria (scheduled for 9/8/22), PM&R, Dr. Darryle Beecham 2 weeks      Electronically signed by Luis Yuen MD on 8/28/2022 at 3:36 PM DC instructions

## 2022-09-01 ENCOUNTER — OUTPATIENT (OUTPATIENT)
Dept: OUTPATIENT SERVICES | Facility: HOSPITAL | Age: 87
LOS: 1 days | End: 2022-09-01
Payer: MEDICARE

## 2022-09-01 DIAGNOSIS — L89.152 PRESSURE ULCER OF SACRAL REGION, STAGE 2: ICD-10-CM

## 2022-09-01 PROCEDURE — 99212 OFFICE O/P EST SF 10 MIN: CPT

## 2022-09-19 ENCOUNTER — EMERGENCY (EMERGENCY)
Facility: HOSPITAL | Age: 87
LOS: 0 days | Discharge: HOME | End: 2022-09-19
Attending: EMERGENCY MEDICINE
Payer: MEDICARE

## 2022-09-19 VITALS
HEART RATE: 79 BPM | DIASTOLIC BLOOD PRESSURE: 84 MMHG | SYSTOLIC BLOOD PRESSURE: 176 MMHG | RESPIRATION RATE: 18 BRPM | OXYGEN SATURATION: 98 % | TEMPERATURE: 98 F

## 2022-09-19 VITALS — HEIGHT: 60 IN | WEIGHT: 74.96 LBS

## 2022-09-19 DIAGNOSIS — R10.9 UNSPECIFIED ABDOMINAL PAIN: ICD-10-CM

## 2022-09-19 DIAGNOSIS — K64.9 UNSPECIFIED HEMORRHOIDS: ICD-10-CM

## 2022-09-19 DIAGNOSIS — R07.81 PLEURODYNIA: ICD-10-CM

## 2022-09-19 DIAGNOSIS — Z88.2 ALLERGY STATUS TO SULFONAMIDES: ICD-10-CM

## 2022-09-19 DIAGNOSIS — Z88.0 ALLERGY STATUS TO PENICILLIN: ICD-10-CM

## 2022-09-19 DIAGNOSIS — S09.90XA UNSPECIFIED INJURY OF HEAD, INITIAL ENCOUNTER: ICD-10-CM

## 2022-09-19 DIAGNOSIS — I34.1 NONRHEUMATIC MITRAL (VALVE) PROLAPSE: ICD-10-CM
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
## 2022-09-19 DIAGNOSIS — I10 ESSENTIAL (PRIMARY) HYPERTENSION: ICD-10-CM

## 2022-09-19 PROCEDURE — 99284 EMERGENCY DEPT VISIT MOD MDM: CPT

## 2022-09-19 PROCEDURE — 71100 X-RAY EXAM RIBS UNI 2 VIEWS: CPT | Mod: RT

## 2022-09-19 PROCEDURE — 70450 CT HEAD/BRAIN W/O DYE: CPT | Mod: 26,MA

## 2022-09-19 PROCEDURE — 71100 X-RAY EXAM RIBS UNI 2 VIEWS: CPT | Mod: 26,RT

## 2022-09-19 PROCEDURE — 71046 X-RAY EXAM CHEST 2 VIEWS: CPT | Mod: 26

## 2022-09-19 PROCEDURE — 70450 CT HEAD/BRAIN W/O DYE: CPT | Mod: MA

## 2022-09-19 PROCEDURE — 72125 CT NECK SPINE W/O DYE: CPT | Mod: MA

## 2022-09-19 PROCEDURE — 72125 CT NECK SPINE W/O DYE: CPT | Mod: 26,MA

## 2022-09-19 PROCEDURE — 99284 EMERGENCY DEPT VISIT MOD MDM: CPT | Mod: 25

## 2022-09-19 PROCEDURE — 71046 X-RAY EXAM CHEST 2 VIEWS: CPT

## 2022-09-19 NOTE — ED PROVIDER NOTE - NSFOLLOWUPINSTRUCTIONS_ED_ALL_ED_FT
Follow up with your doctor tomorrow.   Any worsening headache return to ER  Anything worsens or persists, return to ER for further care and evaluation.

## 2022-09-19 NOTE — ED PROVIDER NOTE - PROGRESS NOTE DETAILS
pt declining CXR and rib XR.  states her pain is much better.  pt is moving freely with RUE and twisting torso.  will cancel XR and dc home into care of aide and son. did end up having XR.  NAD

## 2022-09-19 NOTE — ED ADULT NURSE NOTE - OBJECTIVE STATEMENT
Pt presents to the ED after fall in kitchen claims she hit her head and backside. Patient is alert and oriented, calm, lying in stretcher, VSS stable.

## 2022-09-19 NOTE — ED ADULT TRIAGE NOTE - CHIEF COMPLAINT QUOTE
pt presents to ED due to complaints of slip and fall no blood thinner hit head NA called pt taken to CT

## 2022-09-19 NOTE — ED PROVIDER NOTE - PATIENT PORTAL LINK FT
You can access the FollowMyHealth Patient Portal offered by [facility] by registering at the following website: [email or web address]. By joining [facility]’s FollowMyHealth portal, you will also be able to view your health information using other applications (apps) compatible with our system.

## 2022-09-19 NOTE — ED PROVIDER NOTE - OBJECTIVE STATEMENT
86 y/o F with a PMHx of MVP, fecal impaction, hemorrhoids, HTN presents to the ED s/p fall. Pt fell on the side and hit the back of the head. Pt in not on blood thinners and no obvious bleeding. Pt c/o right flank pain. Pt fell because she didn't have her walker at the time. Pt was ambulatory after the fall.

## 2022-11-12 ENCOUNTER — EMERGENCY (EMERGENCY)
Facility: HOSPITAL | Age: 87
LOS: 0 days | Discharge: HOME | End: 2022-11-12
Attending: EMERGENCY MEDICINE
Payer: MEDICARE

## 2022-11-12 VITALS
HEART RATE: 74 BPM | OXYGEN SATURATION: 100 % | RESPIRATION RATE: 18 BRPM | TEMPERATURE: 98 F | SYSTOLIC BLOOD PRESSURE: 105 MMHG | DIASTOLIC BLOOD PRESSURE: 56 MMHG

## 2022-11-12 VITALS
OXYGEN SATURATION: 100 % | DIASTOLIC BLOOD PRESSURE: 60 MMHG | RESPIRATION RATE: 16 BRPM | SYSTOLIC BLOOD PRESSURE: 106 MMHG | TEMPERATURE: 98 F | HEART RATE: 70 BPM

## 2022-11-12 DIAGNOSIS — I10 ESSENTIAL (PRIMARY) HYPERTENSION: ICD-10-CM

## 2022-11-12 DIAGNOSIS — M54.50 LOW BACK PAIN, UNSPECIFIED: ICD-10-CM

## 2022-11-12 DIAGNOSIS — Z88.2 ALLERGY STATUS TO SULFONAMIDES: ICD-10-CM
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
## 2022-11-12 DIAGNOSIS — I34.1 NONRHEUMATIC MITRAL (VALVE) PROLAPSE: ICD-10-CM

## 2022-11-12 DIAGNOSIS — K59.00 CONSTIPATION, UNSPECIFIED: ICD-10-CM

## 2022-11-12 DIAGNOSIS — K64.9 UNSPECIFIED HEMORRHOIDS: ICD-10-CM

## 2022-11-12 DIAGNOSIS — Z20.822 CONTACT WITH AND (SUSPECTED) EXPOSURE TO COVID-19: ICD-10-CM
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
## 2022-11-12 DIAGNOSIS — S09.90XA UNSPECIFIED INJURY OF HEAD, INITIAL ENCOUNTER: ICD-10-CM

## 2022-11-12 DIAGNOSIS — Z88.0 ALLERGY STATUS TO PENICILLIN: ICD-10-CM

## 2022-11-12 DIAGNOSIS — Z88.8 ALLERGY STATUS TO OTHER DRUGS, MEDICAMENTS AND BIOLOGICAL SUBSTANCES STATUS: ICD-10-CM

## 2022-11-12 LAB
ALBUMIN SERPL ELPH-MCNC: 3.4 G/DL — SIGNIFICANT CHANGE UP (ref 3.3–5)
ALP SERPL-CCNC: 69 U/L — SIGNIFICANT CHANGE UP (ref 40–120)
ALT FLD-CCNC: 25 U/L — SIGNIFICANT CHANGE UP (ref 12–78)
ANION GAP SERPL CALC-SCNC: 3 MMOL/L — LOW (ref 5–17)
APTT BLD: 29.4 SEC — SIGNIFICANT CHANGE UP (ref 27.5–35.5)
AST SERPL-CCNC: 29 U/L — SIGNIFICANT CHANGE UP (ref 15–37)
BASOPHILS # BLD AUTO: 0 K/UL — SIGNIFICANT CHANGE UP (ref 0–0.2)
BASOPHILS NFR BLD AUTO: 0 % — SIGNIFICANT CHANGE UP (ref 0–2)
BILIRUB SERPL-MCNC: 0.3 MG/DL — SIGNIFICANT CHANGE UP (ref 0.2–1.2)
BUN SERPL-MCNC: 30 MG/DL — HIGH (ref 7–23)
CALCIUM SERPL-MCNC: 9.3 MG/DL — SIGNIFICANT CHANGE UP (ref 8.5–10.1)
CHLORIDE SERPL-SCNC: 102 MMOL/L — SIGNIFICANT CHANGE UP (ref 96–108)
CO2 SERPL-SCNC: 32 MMOL/L — HIGH (ref 22–31)
CREAT SERPL-MCNC: 0.86 MG/DL — SIGNIFICANT CHANGE UP (ref 0.5–1.3)
EGFR: 66 ML/MIN/1.73M2 — SIGNIFICANT CHANGE UP
EOSINOPHIL # BLD AUTO: 0.04 K/UL — SIGNIFICANT CHANGE UP (ref 0–0.5)
EOSINOPHIL NFR BLD AUTO: 1 % — SIGNIFICANT CHANGE UP (ref 0–6)
ETHANOL SERPL-MCNC: <10 MG/DL — SIGNIFICANT CHANGE UP (ref 0–10)
FLUAV AG NPH QL: SIGNIFICANT CHANGE UP
FLUBV AG NPH QL: SIGNIFICANT CHANGE UP
GLUCOSE SERPL-MCNC: 91 MG/DL — SIGNIFICANT CHANGE UP (ref 70–99)
HCT VFR BLD CALC: 36.6 % — SIGNIFICANT CHANGE UP (ref 34.5–45)
HGB BLD-MCNC: 11.6 G/DL — SIGNIFICANT CHANGE UP (ref 11.5–15.5)
INR BLD: 1 RATIO — SIGNIFICANT CHANGE UP (ref 0.88–1.16)
LACTATE SERPL-SCNC: 0.3 MMOL/L — LOW (ref 0.7–2)
LIDOCAIN IGE QN: 205 U/L — SIGNIFICANT CHANGE UP (ref 73–393)
LYMPHOCYTES # BLD AUTO: 1.19 K/UL — SIGNIFICANT CHANGE UP (ref 1–3.3)
LYMPHOCYTES # BLD AUTO: 29 % — SIGNIFICANT CHANGE UP (ref 13–44)
MCHC RBC-ENTMCNC: 29.5 PG — SIGNIFICANT CHANGE UP (ref 27–34)
MCHC RBC-ENTMCNC: 31.7 GM/DL — LOW (ref 32–36)
MCV RBC AUTO: 93.1 FL — SIGNIFICANT CHANGE UP (ref 80–100)
MONOCYTES # BLD AUTO: 0.21 K/UL — SIGNIFICANT CHANGE UP (ref 0–0.9)
MONOCYTES NFR BLD AUTO: 5 % — SIGNIFICANT CHANGE UP (ref 2–14)
NEUTROPHILS # BLD AUTO: 2.68 K/UL — SIGNIFICANT CHANGE UP (ref 1.8–7.4)
NEUTROPHILS NFR BLD AUTO: 65 % — SIGNIFICANT CHANGE UP (ref 43–77)
NRBC # BLD: SIGNIFICANT CHANGE UP /100 WBCS (ref 0–0)
PLATELET # BLD AUTO: 177 K/UL — SIGNIFICANT CHANGE UP (ref 150–400)
POTASSIUM SERPL-MCNC: 4.2 MMOL/L — SIGNIFICANT CHANGE UP (ref 3.5–5.3)
POTASSIUM SERPL-SCNC: 4.2 MMOL/L — SIGNIFICANT CHANGE UP (ref 3.5–5.3)
PROT SERPL-MCNC: 7 GM/DL — SIGNIFICANT CHANGE UP (ref 6–8.3)
PROTHROM AB SERPL-ACNC: 11.6 SEC — SIGNIFICANT CHANGE UP (ref 10.5–13.4)
RBC # BLD: 3.93 M/UL — SIGNIFICANT CHANGE UP (ref 3.8–5.2)
RBC # FLD: 14.7 % — HIGH (ref 10.3–14.5)
RSV RNA NPH QL NAA+NON-PROBE: SIGNIFICANT CHANGE UP
SARS-COV-2 RNA SPEC QL NAA+PROBE: SIGNIFICANT CHANGE UP
SODIUM SERPL-SCNC: 137 MMOL/L — SIGNIFICANT CHANGE UP (ref 135–145)
TROPONIN I, HIGH SENSITIVITY RESULT: 9.51 NG/L — SIGNIFICANT CHANGE UP
WBC # BLD: 4.12 K/UL — SIGNIFICANT CHANGE UP (ref 3.8–10.5)
WBC # FLD AUTO: 4.12 K/UL — SIGNIFICANT CHANGE UP (ref 3.8–10.5)

## 2022-11-12 PROCEDURE — 76376 3D RENDER W/INTRP POSTPROCES: CPT

## 2022-11-12 PROCEDURE — 70450 CT HEAD/BRAIN W/O DYE: CPT | Mod: MA

## 2022-11-12 PROCEDURE — 72131 CT LUMBAR SPINE W/O DYE: CPT | Mod: MA

## 2022-11-12 PROCEDURE — 80053 COMPREHEN METABOLIC PANEL: CPT

## 2022-11-12 PROCEDURE — 72125 CT NECK SPINE W/O DYE: CPT | Mod: MA

## 2022-11-12 PROCEDURE — 80307 DRUG TEST PRSMV CHEM ANLYZR: CPT

## 2022-11-12 PROCEDURE — 72131 CT LUMBAR SPINE W/O DYE: CPT | Mod: 26,MA

## 2022-11-12 PROCEDURE — 85730 THROMBOPLASTIN TIME PARTIAL: CPT

## 2022-11-12 PROCEDURE — 99284 EMERGENCY DEPT VISIT MOD MDM: CPT

## 2022-11-12 PROCEDURE — 72125 CT NECK SPINE W/O DYE: CPT | Mod: 26,MA

## 2022-11-12 PROCEDURE — 85025 COMPLETE CBC W/AUTO DIFF WBC: CPT

## 2022-11-12 PROCEDURE — 85610 PROTHROMBIN TIME: CPT

## 2022-11-12 PROCEDURE — 71045 X-RAY EXAM CHEST 1 VIEW: CPT

## 2022-11-12 PROCEDURE — 76376 3D RENDER W/INTRP POSTPROCES: CPT | Mod: 26

## 2022-11-12 PROCEDURE — 36415 COLL VENOUS BLD VENIPUNCTURE: CPT

## 2022-11-12 PROCEDURE — 83605 ASSAY OF LACTIC ACID: CPT

## 2022-11-12 PROCEDURE — 99284 EMERGENCY DEPT VISIT MOD MDM: CPT | Mod: 25

## 2022-11-12 PROCEDURE — 70450 CT HEAD/BRAIN W/O DYE: CPT | Mod: 26,MA

## 2022-11-12 PROCEDURE — 72170 X-RAY EXAM OF PELVIS: CPT | Mod: 26

## 2022-11-12 PROCEDURE — 84484 ASSAY OF TROPONIN QUANT: CPT

## 2022-11-12 PROCEDURE — 72170 X-RAY EXAM OF PELVIS: CPT

## 2022-11-12 PROCEDURE — 72192 CT PELVIS W/O DYE: CPT | Mod: MA

## 2022-11-12 PROCEDURE — 83690 ASSAY OF LIPASE: CPT

## 2022-11-12 PROCEDURE — 72192 CT PELVIS W/O DYE: CPT | Mod: 26,MA

## 2022-11-12 PROCEDURE — 0241U: CPT

## 2022-11-12 PROCEDURE — 71045 X-RAY EXAM CHEST 1 VIEW: CPT | Mod: 26

## 2022-11-12 RX ORDER — SODIUM CHLORIDE 9 MG/ML
250 INJECTION INTRAMUSCULAR; INTRAVENOUS; SUBCUTANEOUS ONCE
Refills: 0 | Status: COMPLETED | OUTPATIENT
Start: 2022-11-12 | End: 2022-11-12

## 2022-11-12 RX ADMIN — SODIUM CHLORIDE 250 MILLILITER(S): 9 INJECTION INTRAMUSCULAR; INTRAVENOUS; SUBCUTANEOUS at 14:45

## 2022-11-12 NOTE — ED ADULT NURSE NOTE - NSIMPLEMENTINTERV_GEN_ALL_ED
Implemented All Fall with Harm Risk Interventions:  [place] to call system. Call bell, personal items and telephone within reach. Instruct patient to call for assistance. Room bathroom lighting operational. Non-slip footwear when patient is off stretcher. Physically safe environment: no spills, clutter or unnecessary equipment. Stretcher in lowest position, wheels locked, appropriate side rails in place. Provide visual cue, wrist band, yellow gown, etc. Monitor gait and stability. Monitor for mental status changes and reorient to person, place, and time. Review medications for side effects contributing to fall risk. Reinforce activity limits and safety measures with patient and family. Provide visual clues: red socks.

## 2022-11-12 NOTE — ED PROVIDER NOTE - NS ED ROS FT
Constitutional: No fevers, chills, or sweats.  Cardiac: No chest pain, exertional dyspnea, orthopnea  Respiratory: No shortness of breath, no cough  GI: No abdominal pain, no N/V/D  Neuro: No headaches, no neck pain/stiffness, no numbness  All other systems reviewed and are negative unless otherwise stated in the HPI. Constitutional: No fevers, chills, or sweats.  Cardiac: No chest pain, exertional dyspnea, orthopnea  Respiratory: No shortness of breath, no cough  GI: No abdominal pain, no N/V/D  Neuro: No headaches, no neck pain/stiffness, no numbness  MSK: +lower back pain  All other systems reviewed and are negative unless otherwise stated in the HPI.

## 2022-11-12 NOTE — ED PROVIDER NOTE - NSICDXPASTSURGICALHX_GEN_ALL_CORE_FT
PAST SURGICAL HISTORY:  No significant past surgical history     No significant past surgical history       
None

## 2022-11-12 NOTE — ED PROVIDER NOTE - PROGRESS NOTE DETAILS
CTs show no acute traumatic injuries.  Patient does have longstanding constipation, had a long discussion with patient and home health aide about use of MiraLAX, Dulcolax at home for this.  Encouraged increase oral hydration.  Patient would prefer to go home as opposed to getting IV fluids here.  Is ambulatory at her baseline per home health aide.  Will clear for discharge with outpatient follow-up

## 2022-11-12 NOTE — ED PROVIDER NOTE - OBJECTIVE STATEMENT
88 yo F with PMHx of MVP, fecal impaction, HTN presents to ED s/p mechanical fall. + head injury, + LOC, and + back pain. Pt denies blood thinners and Aspirin 86 yo F with PMHx of MVP, fecal impaction, HTN presents to ED s/p mechanical fall. Pt was making breakfast when she slipped and fell backwards and hit the back of her head. Denies  LOC. Home health aid witnessed and helped pt stand up. Pt was ambulatory. acute non chronic lower back pain. Denies CP, abd pain, arm or leg pain.

## 2022-11-12 NOTE — ED ADULT TRIAGE NOTE - CHIEF COMPLAINT QUOTE
Pt. to the ED BIB Aide C/O Head Injury and Back Pain S/P Syncope - Unknown LOC- Pt. denies Blood thinners and Aspirin- GCS 15-- Hx of HTN- TA Called by Brando POWERS @ 1209pm--

## 2022-11-12 NOTE — ED ADULT NURSE NOTE - OBJECTIVE STATEMENT
Pt presented to the ER with c/o head injury and back pain. Pt stated that she has a fell and might have had a syncopal episode. Pt is unsure if she had any LOC. Pt denies any blood thinner use at this time. Pt denies any CP, dizziness, or SOB at this time.

## 2022-11-12 NOTE — ED PROVIDER NOTE - PHYSICAL EXAMINATION
General: AAOx3, NAD  HEENT: NCAT  Cardiac: Normal rate and rhythym, no murmurs, normal peripheral perfusion  Respiratory: Normal rate and effort. CTAB  GI: Soft, nondistended, nontender  Neuro: No focal deficits. HERNADEZ equally x4, sensation to light touch intact throughout  MSK: FROMx4, no focal bony tenderness, no peripheral edema  Skin: No rash General: AAOx3, NAD  HEENT: NCAT  Cardiac: Normal rate and rhythym, no murmurs, normal peripheral perfusion  Respiratory: Normal rate and effort. CTAB  GI: Soft, nondistended, nontender  Neuro: No focal deficits. HERNADEZ equally x4, sensation to light touch intact throughout  MSK: FROMx4, no peripheral edema, mild lower lumbar and L lumbar paraspinal tenderness.   Skin: No rash

## 2022-11-12 NOTE — ED PROVIDER NOTE - NSFOLLOWUPINSTRUCTIONS_ED_ALL_ED_FT
Return to the Emergency Department for worsening or persistent symptoms, and/or ANY NEW OR CONCERNING SYMPTOMS. If you have issues obtaining follow up, please call: [phone] or [phone]  to obtain a doctor or specialist who takes your insurance in your area.    Fall Prevention    WHAT YOU NEED TO KNOW:    Fall prevention includes ways to make your home and other areas safer. It also includes ways you can move more carefully to prevent a fall. Health conditions that cause changes in your blood pressure, vision, or muscle strength and coordination may increase your risk for falls. Medicines may also increase your risk for falls if they make you dizzy, weak, or sleepy.     DISCHARGE INSTRUCTIONS:    Call 911 or have someone else call if:     You have fallen and are unconscious.      You have fallen and cannot move part of your body.    Contact your healthcare provider if:     You have fallen and have pain or a headache.      You have questions or concerns about your condition or care.    Fall prevention tips:     Stand or sit up slowly. This may help you keep your balance and prevent falls.      Use assistive devices as directed. Your healthcare provider may suggest that you use a cane or walker to help you keep your balance. You may need to have grab bars put in your bathroom near the toilet or in the shower.      Wear shoes that fit well and have soles that . Wear shoes both inside and outside. Use slippers with good . Do not wear shoes with high heels.      Wear a personal alarm. This is a device that allows you to call 911 if you fall and need help. Ask your healthcare provider for more information.      Stay active. Exercise can help strengthen your muscles and improve your balance. Your healthcare provider may recommend water aerobics or walking. He or she may also recommend physical therapy to improve your coordination. Never start an exercise program without talking to your healthcare provider first.       Manage your medical conditions. Keep all appointments with your healthcare providers. Visit your eye doctor as directed.           Home safety tips:     Add items to prevent falls in the bathroom. Put nonslip strips on your bath or shower floor to prevent you from slipping. Use a bath mat if you do not have carpet in the bathroom. This will prevent you from falling when you step out of the bath or shower. Use a shower seat so you do not need to stand while you shower. Sit on the toilet or a chair in your bathroom to dry yourself and put on clothing. This will prevent you from losing your balance from drying or dressing yourself while you are standing.       Keep paths clear. Remove books, shoes, and other objects from walkways and stairs. Place cords for telephones and lamps out of the way so that you do not need to walk over them. Tape them down if you cannot move them. Remove small rugs. If you cannot remove a rug, secure it with double-sided tape. This will prevent you from tripping.       Install bright lights in your home. Use night lights to help light paths to the bathroom or kitchen. Always turn on the light before you start walking.      Keep items you use often on shelves within reach. Do not use a step stool to help you reach an item.      Paint or place reflective tape on the edges of your stairs. This will help you see the stairs better.    Follow up with your healthcare provider as directed: Write down your questions so you remember to ask them during your visits.

## 2022-12-29 ENCOUNTER — OUTPATIENT (OUTPATIENT)
Dept: OUTPATIENT SERVICES | Facility: HOSPITAL | Age: 87
LOS: 1 days | End: 2022-12-29
Payer: MEDICARE

## 2022-12-29 DIAGNOSIS — L89.152 PRESSURE ULCER OF SACRAL REGION, STAGE 2: ICD-10-CM

## 2022-12-29 PROCEDURE — 99203 OFFICE O/P NEW LOW 30 MIN: CPT

## 2022-12-30 DIAGNOSIS — L97.428 NON-PRESSURE CHRONIC ULCER OF LEFT HEEL AND MIDFOOT WITH OTHER SPECIFIED SEVERITY: ICD-10-CM

## 2022-12-30 DIAGNOSIS — L89.152 PRESSURE ULCER OF SACRAL REGION, STAGE 2: ICD-10-CM

## 2023-01-12 ENCOUNTER — OUTPATIENT (OUTPATIENT)
Dept: OUTPATIENT SERVICES | Facility: HOSPITAL | Age: 88
LOS: 1 days | End: 2023-01-12
Payer: MEDICARE

## 2023-01-12 DIAGNOSIS — L89.152 PRESSURE ULCER OF SACRAL REGION, STAGE 2: ICD-10-CM

## 2023-01-12 PROCEDURE — 99212 OFFICE O/P EST SF 10 MIN: CPT

## 2023-01-13 DIAGNOSIS — G60.9 HEREDITARY AND IDIOPATHIC NEUROPATHY, UNSPECIFIED: ICD-10-CM

## 2023-01-13 DIAGNOSIS — L89.152 PRESSURE ULCER OF SACRAL REGION, STAGE 2: ICD-10-CM

## 2023-01-13 DIAGNOSIS — I10 ESSENTIAL (PRIMARY) HYPERTENSION: ICD-10-CM

## 2023-02-02 ENCOUNTER — OUTPATIENT (OUTPATIENT)
Dept: OUTPATIENT SERVICES | Facility: HOSPITAL | Age: 88
LOS: 1 days | End: 2023-02-02
Payer: MEDICARE

## 2023-02-02 DIAGNOSIS — L89.152 PRESSURE ULCER OF SACRAL REGION, STAGE 2: ICD-10-CM

## 2023-02-02 PROCEDURE — 99212 OFFICE O/P EST SF 10 MIN: CPT

## 2023-02-03 DIAGNOSIS — L89.152 PRESSURE ULCER OF SACRAL REGION, STAGE 2: ICD-10-CM

## 2023-02-03 DIAGNOSIS — I10 ESSENTIAL (PRIMARY) HYPERTENSION: ICD-10-CM

## 2023-02-03 DIAGNOSIS — G60.9 HEREDITARY AND IDIOPATHIC NEUROPATHY, UNSPECIFIED: ICD-10-CM

## 2023-03-17 NOTE — ED ADULT NURSE NOTE - TEMPLATE LIST FOR HEAD TO TOE ASSESSMENT
Abdominal Pain, N/V/D Cyclophosphamide Counseling:  I discussed with the patient the risks of cyclophosphamide including but not limited to hair loss, hormonal abnormalities, decreased fertility, abdominal pain, diarrhea, nausea and vomiting, bone marrow suppression and infection. The patient understands that monitoring is required while taking this medication.

## 2023-04-06 NOTE — ED PROVIDER NOTE - PATIENT PORTAL LINK FT
Recommend to decrease Pristiq to every other day for the next week. Start Lexapro at 5 mg daily. Stop the over-the-counter sleep aids. Recommend to research cognitive behavioral therapy for insomnia for tips on trying to help her sleep. Continue sleep therapy. Follow-up in 1 month with Dr. Adria Khalil if needed. Advised if still with sleep apnea and not using CPAP has a  7 fold increase in risk of heart attack, stroke, abnormal heart rhythm  and death,  increased risk of driving accidents. Advised to refrain from driving when sleepy. COMPLIANCE is required by insurance for 4 hours a night most nights of the week. Recommend weight loss, and maintain and optimal BMI with Exercise 30 minutes most days of the week to target heart rate . Advised patient to change filters,masks,hoses  and tubes and equiptment on a  regular schedule. Filters and seals shall be changed every 1 month,  Hoses every 3 months,   CPAP mask and humidifier  chamber changed every 6 month  with the Durable medical equipment provider.
You can access the FollowMyHealth Patient Portal offered by [facility] by registering at the following website: [email or web address]. By joining [facility]’s FollowMyHealth portal, you will also be able to view your health information using other applications (apps) compatible with our system.

## 2023-04-24 ENCOUNTER — APPOINTMENT (OUTPATIENT)
Dept: GASTROENTEROLOGY | Facility: CLINIC | Age: 88
End: 2023-04-24
Payer: MEDICARE

## 2023-04-24 VITALS
SYSTOLIC BLOOD PRESSURE: 167 MMHG | DIASTOLIC BLOOD PRESSURE: 81 MMHG | HEIGHT: 65 IN | HEART RATE: 59 BPM | BODY MASS INDEX: 13.16 KG/M2 | WEIGHT: 79 LBS

## 2023-04-24 DIAGNOSIS — R63.4 ABNORMAL WEIGHT LOSS: ICD-10-CM

## 2023-04-24 DIAGNOSIS — R63.0 ANOREXIA: ICD-10-CM

## 2023-04-24 PROCEDURE — 99215 OFFICE O/P EST HI 40 MIN: CPT

## 2023-04-24 NOTE — HISTORY OF PRESENT ILLNESS
[FreeTextEntry1] : 88 F presenting for evaluation of abdominal bloating. She presented with her aide Nancy, her son Alexi was present via telephone who assisted in obtaining HPI. For the last few years she has been having abdominal pain/bloating, weight loss, constipation and poor appetite. Per her son she frequently needs to use dulcolax to have BMs. Due to constipation she has poor appetite. When she does eat she eats foods that are high in carbohydrates. Has very little protein and fat intake. Nancy and Alexi have attempted to make protein shakes, and encourage Ensure intake, however patient refuses to drink these. Per Alexi she is following with psychiatrist Dr. Mosher who has recommended they bring her to the hospital for further evaluation. Per Alexi lab work was performed last week that was negative, he will have copy sent to our office.

## 2023-04-24 NOTE — ASSESSMENT
[FreeTextEntry1] : 88 F presenting for evaluation of abdominal bloating. She presented with her aide Nancy, her son Alexi was present via telephone who assisted with history. She has had progressively worsening failure to thrive over the last few years, and now is down to 78 lbs. In chart review was 85 lb in 7/2021. I had an at length discussion with patient, Alexi, and Nancy in regards to the importance of adequate protein intake to help maintain her nutritional needs. Ultimately patient disagreed with nutritional recommendations, as she feels she "eats a lot". I recommended local RD for her to establish care with so we may better assess patient's actual nutrition requirements. As for bloating, and constipation. Likely bloating is due to constipation. Rather than dulcolax, she can take Miralax PRN when constipated. Will obtain abdominal US to r/o other abdominal etiology. Should continue to follow with psychiatry for their recommendations. Will review last weeks labs once available. All questions answered. Discussed with Dr. Su. \par \par \par Time based billing : Total time spent is 50 minutes for coordination of care, record review, physical exam, documentation and patient visit.

## 2023-04-24 NOTE — PHYSICAL EXAM
[Alert] : alert [Normal Voice/Communication] : normal voice/communication [Bowel Sounds] : normal bowel sounds [No Masses] : no abdominal mass palpated [Abdomen Soft] : soft [] : no hepatosplenomegaly [Normal] : oriented to person, place, and time [de-identified] : thin, frail [de-identified] : hard of hearing [de-identified] : thin, frail, mild abdominal tenderness

## 2023-04-27 ENCOUNTER — APPOINTMENT (OUTPATIENT)
Dept: ULTRASOUND IMAGING | Facility: CLINIC | Age: 88
End: 2023-04-27

## 2023-04-28 NOTE — ED ADULT NURSE NOTE - CAS DISCH TRANSFER METHOD
Patient awake and alert this morning, able to sustain long conversations without falling asleep. Patient remained on CPAP= 10 cmH2O with 4 L O2 bleed in throughout the night. ABG obtained this morning on the CPAP and still WNL. Patient encouraged to sit in the chair for meals and increase activity. Patient eager and engaged.  Patient's SpO2 is observed to be continuously at 100%. Given patients severe COPD and chronic CO2 retention, plan is to decreased oxygen to maintain SPO2 in lower 90's and see if it helps with decreased alertness.   Private car

## 2023-05-17 NOTE — DISCHARGE NOTE PROVIDER - NSDCHHHOMEBOUND_GEN_ALL_CORE
AVS from 5/17/23    Rv in 3 months woth labs priro     Rv sched for 8/16 @ 9:45 am     Pt will have labs drawn one week prior to RV    . after    Electronically signed by Avani Gray on 5/17/2023 at 10:41 AM
Fall risk

## 2023-05-30 ENCOUNTER — APPOINTMENT (OUTPATIENT)
Dept: GASTROENTEROLOGY | Facility: CLINIC | Age: 88
End: 2023-05-30

## 2023-08-09 NOTE — BH CONSULTATION LIAISON ASSESSMENT NOTE - NSBHATTESTSEENBY_PSY_A_CORE
FOLLOW UP OFFICE VISIT    DATE:           2023    PATIENT: Griselda Peoples  MRN:  [id number]  :  1954      Chief Complaint   Patient presents with   • Office Visit   • Follow-up     6 month f/u         HISTORY OF PRESENT ILLNESS:  Griselda Peoples is a 68 year old female returns the office in follow-up of her coronary disease, angioplasty, ischemic cardiomyopathy and hypercholesterolemia.    This patient is very interesting in that 6 months ago she stopped taking all her medications.  Patient is very forthcoming regarding this decision and continues to refuse medication.    Patient is doing some walking.  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  Unfortunately she has gained some weight.  She is 3 and 13 pounds and gained 5 pounds.  This was discussed.    Patient blood pressure is 138/78.  Patient does not check her blood pressure and heart rate at home.    Patient stopped her medications because she states she is feeling fine.  Patient denies any chest pain chest pressure heaviness  Patient states her breathing is okay.  She does have dyspnea on exertion but blames this on her being overweight.  Denies any orthopnea or PND  Denies palpitations presyncope or syncope  Mild ankle swelling and unchanged.          -------------------------------     Impression     -------------------------------    #1 CAD  Status post acute anteroapical myocardial infarctions 8/15/2017  Infarct probably occurred 3 days prior to admission  Patient does not have any anginal symptoms  Patient has not had any recent stress testing since she has not had any symptoms but that must be considered but she does not wish to proceed    Patient has noncompliance and stopped all her medications    Abnormal EKG 3/8/2022  Normal sinus rhythm with inferior and anteroseptal scar and nonspecific ST-T wave changes    Angioplasty 8/15/2017  % mid stented  LAD distal 80% stented  EF 40% with severe septal apical anterior akinesis    2.   Ischemic cardiomyopathy  EF 40%.  In 2017  Patient stopped all her medications because she feels well    Repeat echocardiogram Zoey 15, 2021  Left ventricular size normal with normal wall motion ejection fraction 55% the valves are okay.  Excellent recovery.    3.  Significant lower extremity cellulitis and dermatitis.  Being treated at [facility] wound clinic at [facility]    4.  Morbid obesity.  .  Currently 313 pounds with BMI of 41.3    5.  Hypercholesterolemia  On a statin    6.  Severe knee pain and arthritis  Results in significant mobility difficulties  Patient is scheduled to have right knee replacement March 2022    7.  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  Noncompliance with medication  No medication x 6 months and does not wish to proceed because she is feeling well.  I have my concerns because of her history of cardiomyopathy and significant coronary disease  This was discussed      ALLERGIES:  No Known Allergies      Current Outpatient Medications   Medication Sig Dispense Refill   • atorvastatin (LIPITOR) 40 MG tablet TAKE 1 TABLET AT BEDTIME 90 tablet 1   • clopidogrel (PLAVIX) 75 MG tablet      • electrolyte/PEG 3350 (GOLYTELY) 236 g solution Take one bottle from 5-7 pm and the second bottle from 1-3 am, complete all of the prep at least 4 hours before the procedure 4000 mL 0   • bisacodyl (DULCOLAX) 5 MG EC tablet Take as directed per colonoscopy prep instructions 2 tablet 0   • Multiple Vitamins-Minerals (MULTIVITAMIN ADULTS 50+ PO)      • potassium CHLORIDE (KLOR-CON M) 20 MEQ lydia ER tablet TAKE 1 TABLET EVERY DAY 90 tablet 3   • losartan (COZAAR) 25 MG tablet TAKE 1/2 TABLET EVERY DAY 45 tablet 3   • furosemide (LASIX) 40 MG tablet TAKE 1 TABLET EVERY DAY (REPLACING FUROSEMIDE 20MG DOSE) (Patient taking differently: Take 40 mg by mouth daily.) 90 tablet 3   • metoPROLOL succinate (TOPROL-XL) 25  MG 24 hr tablet TAKE 1 TABLET EVERY DAY 90 tablet 3   • spironolactone (ALDACTONE) 25 MG tablet TAKE 1/2 TABLET EVERY DAY 45 tablet 3   • Omega-3 300 MG Cap Take 300 mg by mouth daily. 90 capsule 0   • cholecalciferol (cholecalciferol) 25 mcg (1,000 units) tablet Take 1 tablet by mouth daily. 90 tablet 0   • aspirin 81 MG tablet Take 81 mg by mouth daily.       No current facility-administered medications for this visit.         Family History   Problem Relation Age of Onset   • Dementia/Alzheimers Mother    • Heart disease Father    • Congestive Heart Failure Father    • Dementia/Alzheimers Father    • Myocardial Infarction Father    • Cancer, Breast Maternal Cousin          in her 40s         Review of Systems   Constitutional: Negative for fatigue.   HENT: Negative for congestion.    Eyes: Negative.    Respiratory: Negative for chest tightness, shortness of breath and wheezing.    Cardiovascular: Negative for chest pain, palpitations and leg swelling.   Gastrointestinal: Negative for abdominal pain and nausea.   Endocrine: Negative.    Genitourinary: Negative for dysuria and hematuria.   Musculoskeletal: Negative for arthralgias and back pain.        Difficulty walking due to cellulitis and dermatitis lower extremities   Allergic/Immunologic: Negative.    Neurological: Negative for dizziness, syncope, weakness, light-headedness, numbness and headaches.   Hematological: Negative.    Psychiatric/Behavioral: Negative for confusion. The patient is not nervous/anxious.          Visit Vitals  /78 (BP Location: RUE - Right upper extremity, Patient Position: Sitting, Cuff Size: Large Adult)   Pulse 80   Ht 6' 1\" (1.854 m)   Wt (!) 142 kg (313 lb)   BMI 41.30 kg/m²         Physical Exam  Vitals and nursing note reviewed.   Constitutional:       Comments: Significant obesity   HENT:      Head: Normocephalic.   Eyes:      Pupils: Pupils are equal, round, and reactive to light.   Neck:      Thyroid: No thyromegaly.       Vascular: No JVD.   Cardiovascular:      Rate and Rhythm: Normal rate and regular rhythm.      Heart sounds: Normal heart sounds. No murmur heard.     No friction rub. No gallop.   Pulmonary:      Effort: No respiratory distress.      Breath sounds: Normal breath sounds. No wheezing or rales.   Chest:      Chest wall: No tenderness.   Abdominal:      General: Bowel sounds are normal. There is no distension.      Palpations: Abdomen is soft.      Tenderness: There is no abdominal tenderness.   Musculoskeletal:         General: No tenderness or deformity. Normal range of motion.      Cervical back: Normal range of motion.   Lymphadenopathy:      Cervical: No cervical adenopathy.   Skin:     General: Skin is warm and dry.      Findings: No erythema or rash.   Neurological:      Mental Status: She is alert and oriented to person, place, and time.   Psychiatric:         Behavior: Behavior normal.         Clinical Data:  The following were personally visualized and interpreted by me:    ECG:  March 8, 2022  Normal sinus rhythm  Anterior scar  Probable anteroseptal scar  Nonspecific ST-T wave changes    Echocardiogram:  Echocardiogram Zoey 15, 2021  The left ventricular size is normal and normal wall motion with ejection fraction 55%  Valves are okay  Excellent recovery of LV function    Ejection fraction 2017 was 40%    Stress test:     Cardiac cath:   8/15/2017  % stented  Distal LAD 80% stented  EF 40% with severe apical anterior akinesis        ASSESSMENT/PLAN:  Griselda was seen today for office visit and follow-up.    Diagnoses and all orders for this visit:    Ischemic cardiomyopathy  -     Transthoracic Echo (TTE) Complete; Future    Congestive heart failure, unspecified HF chronicity, unspecified heart failure type (CMD)  -     Transthoracic Echo (TTE) Complete; Future    Coronary artery disease of native artery of native heart with stable angina pectoris (CMD)    Essential hypertension    H/O heart  artery stent    Mixed hyperlipidemia    Pure hypercholesterolemia        This patient is very forthright that she stopped all her medications because she is feeling well.  She does not wish to be on medications    This is bothersome because of her history of cardiomyopathy and her history of coronary disease status post stenting.  Her last ejection fraction was 40%.    I asked this patient to get an echocardiogram to evaluate LV function.  She stated she would proceed but I have my concerns.    I encouraged this patient to go back on her medication but she refuses at this time.    Patient is holding on her own including   aspirin 81,   atorvastatin 40, Plavix 75,   furosemide 40 mg/day,   losartan 25 mg/day  Metoprolol succinate 25 mg/day  Omega-3 fatty acids  Potassium chloride  Spironolactone 12.5        Patient will follow-up in 4 months.  Sooner if she has any cardiac symptoms or if she has reconsidered her compliance  Weight loss is strongly encouraged      Marcin Silva MD  8/10/2023             attending Psychiatrist without NP/Trainee

## 2023-10-06 ENCOUNTER — APPOINTMENT (OUTPATIENT)
Dept: GASTROENTEROLOGY | Facility: CLINIC | Age: 88
End: 2023-10-06
Payer: MEDICARE

## 2023-10-06 VITALS
DIASTOLIC BLOOD PRESSURE: 94 MMHG | BODY MASS INDEX: 12.83 KG/M2 | SYSTOLIC BLOOD PRESSURE: 152 MMHG | WEIGHT: 77 LBS | HEIGHT: 65 IN

## 2023-10-06 DIAGNOSIS — R14.0 ABDOMINAL DISTENSION (GASEOUS): ICD-10-CM

## 2023-10-06 PROCEDURE — 99214 OFFICE O/P EST MOD 30 MIN: CPT

## 2023-10-07 PROBLEM — R14.0 ABDOMINAL BLOATING: Status: ACTIVE | Noted: 2023-04-24

## 2023-10-18 NOTE — ED PROVIDER NOTE - TEMPLATE, MLM
MEDICATION REFILL    Pharmacy Name:  [facility]  Medication requested          Icosapent ethyl   1 gram capsule  Dosage    2 tablets 2x daily with meals  Quantity     90 days   Allergies    Naprosyn  Date of last visit    10/12/2023  Date of Next Appointment [  ]     General

## 2023-11-16 ENCOUNTER — APPOINTMENT (OUTPATIENT)
Dept: GASTROENTEROLOGY | Facility: CLINIC | Age: 88
End: 2023-11-16
Payer: MEDICARE

## 2023-11-16 VITALS
HEIGHT: 65 IN | SYSTOLIC BLOOD PRESSURE: 130 MMHG | BODY MASS INDEX: 13 KG/M2 | WEIGHT: 78 LBS | DIASTOLIC BLOOD PRESSURE: 74 MMHG

## 2023-11-16 DIAGNOSIS — K59.04 CHRONIC IDIOPATHIC CONSTIPATION: ICD-10-CM

## 2023-11-16 PROCEDURE — 99214 OFFICE O/P EST MOD 30 MIN: CPT

## 2023-11-17 PROBLEM — K59.04 CHRONIC IDIOPATHIC CONSTIPATION: Status: ACTIVE | Noted: 2019-06-03

## 2023-12-21 ENCOUNTER — APPOINTMENT (OUTPATIENT)
Dept: GASTROENTEROLOGY | Facility: CLINIC | Age: 88
End: 2023-12-21
Payer: MEDICARE

## 2023-12-21 PROCEDURE — 99442: CPT | Mod: 95

## 2023-12-21 NOTE — REASON FOR VISIT
[Home] : at home, [unfilled] , at the time of the visit. [Medical Office: ([facility])___] : at the medical office located in  [Verbal consent obtained from patient] : the patient, [unfilled]

## 2023-12-25 NOTE — HISTORY OF PRESENT ILLNESS
[FreeTextEntry1] : 87yo female with constipation telephonic visit  She has started linzess and is going more regularly She can have some loose stool at times Still using miralax and colace

## 2024-01-08 ENCOUNTER — INPATIENT (INPATIENT)
Facility: HOSPITAL | Age: 89
LOS: 1 days | Discharge: HOME | DRG: 85 | End: 2024-01-10
Attending: EMERGENCY MEDICINE
Payer: MEDICARE

## 2024-01-08 VITALS
SYSTOLIC BLOOD PRESSURE: 202 MMHG | WEIGHT: 75.84 LBS | OXYGEN SATURATION: 97 % | RESPIRATION RATE: 20 BRPM | HEART RATE: 59 BPM | HEIGHT: 63 IN | TEMPERATURE: 98 F | DIASTOLIC BLOOD PRESSURE: 78 MMHG

## 2024-01-08 DIAGNOSIS — S06.6X0A TRAUMATIC SUBARACHNOID HEMORRHAGE WITHOUT LOSS OF CONSCIOUSNESS, INITIAL ENCOUNTER: ICD-10-CM

## 2024-01-08 LAB
ALBUMIN SERPL ELPH-MCNC: 3.5 G/DL — SIGNIFICANT CHANGE UP (ref 3.3–5)
ALBUMIN SERPL ELPH-MCNC: 3.5 G/DL — SIGNIFICANT CHANGE UP (ref 3.3–5)
ALP SERPL-CCNC: 91 U/L — SIGNIFICANT CHANGE UP (ref 40–120)
ALP SERPL-CCNC: 91 U/L — SIGNIFICANT CHANGE UP (ref 40–120)
ALT FLD-CCNC: 45 U/L — SIGNIFICANT CHANGE UP (ref 12–78)
ALT FLD-CCNC: 45 U/L — SIGNIFICANT CHANGE UP (ref 12–78)
ANION GAP SERPL CALC-SCNC: 1 MMOL/L — LOW (ref 5–17)
ANION GAP SERPL CALC-SCNC: 1 MMOL/L — LOW (ref 5–17)
ANION GAP SERPL CALC-SCNC: 4 MMOL/L — LOW (ref 5–17)
ANION GAP SERPL CALC-SCNC: 4 MMOL/L — LOW (ref 5–17)
APTT BLD: 28.2 SEC — SIGNIFICANT CHANGE UP (ref 24.5–35.6)
APTT BLD: 28.2 SEC — SIGNIFICANT CHANGE UP (ref 24.5–35.6)
AST SERPL-CCNC: 44 U/L — HIGH (ref 15–37)
AST SERPL-CCNC: 44 U/L — HIGH (ref 15–37)
BASOPHILS # BLD AUTO: 0.02 K/UL — SIGNIFICANT CHANGE UP (ref 0–0.2)
BASOPHILS # BLD AUTO: 0.02 K/UL — SIGNIFICANT CHANGE UP (ref 0–0.2)
BASOPHILS # BLD AUTO: 0.03 K/UL — SIGNIFICANT CHANGE UP (ref 0–0.2)
BASOPHILS # BLD AUTO: 0.03 K/UL — SIGNIFICANT CHANGE UP (ref 0–0.2)
BASOPHILS NFR BLD AUTO: 0.2 % — SIGNIFICANT CHANGE UP (ref 0–2)
BASOPHILS NFR BLD AUTO: 0.2 % — SIGNIFICANT CHANGE UP (ref 0–2)
BASOPHILS NFR BLD AUTO: 0.4 % — SIGNIFICANT CHANGE UP (ref 0–2)
BASOPHILS NFR BLD AUTO: 0.4 % — SIGNIFICANT CHANGE UP (ref 0–2)
BILIRUB SERPL-MCNC: 0.5 MG/DL — SIGNIFICANT CHANGE UP (ref 0.2–1.2)
BILIRUB SERPL-MCNC: 0.5 MG/DL — SIGNIFICANT CHANGE UP (ref 0.2–1.2)
BUN SERPL-MCNC: 24 MG/DL — HIGH (ref 7–23)
BUN SERPL-MCNC: 24 MG/DL — HIGH (ref 7–23)
BUN SERPL-MCNC: 26 MG/DL — HIGH (ref 7–23)
BUN SERPL-MCNC: 26 MG/DL — HIGH (ref 7–23)
CALCIUM SERPL-MCNC: 9.1 MG/DL — SIGNIFICANT CHANGE UP (ref 8.5–10.1)
CALCIUM SERPL-MCNC: 9.1 MG/DL — SIGNIFICANT CHANGE UP (ref 8.5–10.1)
CALCIUM SERPL-MCNC: 9.8 MG/DL — SIGNIFICANT CHANGE UP (ref 8.5–10.1)
CALCIUM SERPL-MCNC: 9.8 MG/DL — SIGNIFICANT CHANGE UP (ref 8.5–10.1)
CHLORIDE SERPL-SCNC: 103 MMOL/L — SIGNIFICANT CHANGE UP (ref 96–108)
CHLORIDE SERPL-SCNC: 103 MMOL/L — SIGNIFICANT CHANGE UP (ref 96–108)
CHLORIDE SERPL-SCNC: 106 MMOL/L — SIGNIFICANT CHANGE UP (ref 96–108)
CHLORIDE SERPL-SCNC: 106 MMOL/L — SIGNIFICANT CHANGE UP (ref 96–108)
CO2 SERPL-SCNC: 31 MMOL/L — SIGNIFICANT CHANGE UP (ref 22–31)
CO2 SERPL-SCNC: 31 MMOL/L — SIGNIFICANT CHANGE UP (ref 22–31)
CO2 SERPL-SCNC: 32 MMOL/L — HIGH (ref 22–31)
CO2 SERPL-SCNC: 32 MMOL/L — HIGH (ref 22–31)
CREAT SERPL-MCNC: 0.87 MG/DL — SIGNIFICANT CHANGE UP (ref 0.5–1.3)
CREAT SERPL-MCNC: 0.87 MG/DL — SIGNIFICANT CHANGE UP (ref 0.5–1.3)
CREAT SERPL-MCNC: 1.02 MG/DL — SIGNIFICANT CHANGE UP (ref 0.5–1.3)
CREAT SERPL-MCNC: 1.02 MG/DL — SIGNIFICANT CHANGE UP (ref 0.5–1.3)
EGFR: 54 ML/MIN/1.73M2 — LOW
EGFR: 54 ML/MIN/1.73M2 — LOW
EGFR: 65 ML/MIN/1.73M2 — SIGNIFICANT CHANGE UP
EGFR: 65 ML/MIN/1.73M2 — SIGNIFICANT CHANGE UP
EOSINOPHIL # BLD AUTO: 0.03 K/UL — SIGNIFICANT CHANGE UP (ref 0–0.5)
EOSINOPHIL NFR BLD AUTO: 0.3 % — SIGNIFICANT CHANGE UP (ref 0–6)
EOSINOPHIL NFR BLD AUTO: 0.3 % — SIGNIFICANT CHANGE UP (ref 0–6)
EOSINOPHIL NFR BLD AUTO: 0.4 % — SIGNIFICANT CHANGE UP (ref 0–6)
EOSINOPHIL NFR BLD AUTO: 0.4 % — SIGNIFICANT CHANGE UP (ref 0–6)
ETHANOL SERPL-MCNC: <10 MG/DL — SIGNIFICANT CHANGE UP (ref 0–10)
ETHANOL SERPL-MCNC: <10 MG/DL — SIGNIFICANT CHANGE UP (ref 0–10)
GLUCOSE SERPL-MCNC: 114 MG/DL — HIGH (ref 70–99)
GLUCOSE SERPL-MCNC: 114 MG/DL — HIGH (ref 70–99)
GLUCOSE SERPL-MCNC: 126 MG/DL — HIGH (ref 70–99)
GLUCOSE SERPL-MCNC: 126 MG/DL — HIGH (ref 70–99)
HCT VFR BLD CALC: 38.3 % — SIGNIFICANT CHANGE UP (ref 34.5–45)
HCT VFR BLD CALC: 38.3 % — SIGNIFICANT CHANGE UP (ref 34.5–45)
HCT VFR BLD CALC: 40 % — SIGNIFICANT CHANGE UP (ref 34.5–45)
HCT VFR BLD CALC: 40 % — SIGNIFICANT CHANGE UP (ref 34.5–45)
HGB BLD-MCNC: 12.2 G/DL — SIGNIFICANT CHANGE UP (ref 11.5–15.5)
HGB BLD-MCNC: 12.2 G/DL — SIGNIFICANT CHANGE UP (ref 11.5–15.5)
HGB BLD-MCNC: 12.8 G/DL — SIGNIFICANT CHANGE UP (ref 11.5–15.5)
HGB BLD-MCNC: 12.8 G/DL — SIGNIFICANT CHANGE UP (ref 11.5–15.5)
IMM GRANULOCYTES NFR BLD AUTO: 7.8 % — HIGH (ref 0–0.9)
IMM GRANULOCYTES NFR BLD AUTO: 7.8 % — HIGH (ref 0–0.9)
IMM GRANULOCYTES NFR BLD AUTO: 8.7 % — HIGH (ref 0–0.9)
IMM GRANULOCYTES NFR BLD AUTO: 8.7 % — HIGH (ref 0–0.9)
INR BLD: 0.92 RATIO — SIGNIFICANT CHANGE UP (ref 0.85–1.18)
INR BLD: 0.92 RATIO — SIGNIFICANT CHANGE UP (ref 0.85–1.18)
LACTATE SERPL-SCNC: 0.9 MMOL/L — SIGNIFICANT CHANGE UP (ref 0.7–2)
LACTATE SERPL-SCNC: 0.9 MMOL/L — SIGNIFICANT CHANGE UP (ref 0.7–2)
LIDOCAIN IGE QN: 40 U/L — SIGNIFICANT CHANGE UP (ref 13–75)
LIDOCAIN IGE QN: 40 U/L — SIGNIFICANT CHANGE UP (ref 13–75)
LYMPHOCYTES # BLD AUTO: 0.6 K/UL — LOW (ref 1–3.3)
LYMPHOCYTES # BLD AUTO: 0.6 K/UL — LOW (ref 1–3.3)
LYMPHOCYTES # BLD AUTO: 0.73 K/UL — LOW (ref 1–3.3)
LYMPHOCYTES # BLD AUTO: 0.73 K/UL — LOW (ref 1–3.3)
LYMPHOCYTES # BLD AUTO: 7.7 % — LOW (ref 13–44)
LYMPHOCYTES # BLD AUTO: 7.7 % — LOW (ref 13–44)
LYMPHOCYTES # BLD AUTO: 8.7 % — LOW (ref 13–44)
LYMPHOCYTES # BLD AUTO: 8.7 % — LOW (ref 13–44)
MCHC RBC-ENTMCNC: 29.2 PG — SIGNIFICANT CHANGE UP (ref 27–34)
MCHC RBC-ENTMCNC: 29.2 PG — SIGNIFICANT CHANGE UP (ref 27–34)
MCHC RBC-ENTMCNC: 29.3 PG — SIGNIFICANT CHANGE UP (ref 27–34)
MCHC RBC-ENTMCNC: 29.3 PG — SIGNIFICANT CHANGE UP (ref 27–34)
MCHC RBC-ENTMCNC: 31.9 GM/DL — LOW (ref 32–36)
MCHC RBC-ENTMCNC: 31.9 GM/DL — LOW (ref 32–36)
MCHC RBC-ENTMCNC: 32 GM/DL — SIGNIFICANT CHANGE UP (ref 32–36)
MCHC RBC-ENTMCNC: 32 GM/DL — SIGNIFICANT CHANGE UP (ref 32–36)
MCV RBC AUTO: 91.5 FL — SIGNIFICANT CHANGE UP (ref 80–100)
MCV RBC AUTO: 91.5 FL — SIGNIFICANT CHANGE UP (ref 80–100)
MCV RBC AUTO: 91.6 FL — SIGNIFICANT CHANGE UP (ref 80–100)
MCV RBC AUTO: 91.6 FL — SIGNIFICANT CHANGE UP (ref 80–100)
MONOCYTES # BLD AUTO: 0.64 K/UL — SIGNIFICANT CHANGE UP (ref 0–0.9)
MONOCYTES # BLD AUTO: 0.64 K/UL — SIGNIFICANT CHANGE UP (ref 0–0.9)
MONOCYTES # BLD AUTO: 1.03 K/UL — HIGH (ref 0–0.9)
MONOCYTES # BLD AUTO: 1.03 K/UL — HIGH (ref 0–0.9)
MONOCYTES NFR BLD AUTO: 10.8 % — SIGNIFICANT CHANGE UP (ref 2–14)
MONOCYTES NFR BLD AUTO: 10.8 % — SIGNIFICANT CHANGE UP (ref 2–14)
MONOCYTES NFR BLD AUTO: 9.2 % — SIGNIFICANT CHANGE UP (ref 2–14)
MONOCYTES NFR BLD AUTO: 9.2 % — SIGNIFICANT CHANGE UP (ref 2–14)
NEUTROPHILS # BLD AUTO: 5.03 K/UL — SIGNIFICANT CHANGE UP (ref 1.8–7.4)
NEUTROPHILS # BLD AUTO: 5.03 K/UL — SIGNIFICANT CHANGE UP (ref 1.8–7.4)
NEUTROPHILS # BLD AUTO: 6.99 K/UL — SIGNIFICANT CHANGE UP (ref 1.8–7.4)
NEUTROPHILS # BLD AUTO: 6.99 K/UL — SIGNIFICANT CHANGE UP (ref 1.8–7.4)
NEUTROPHILS NFR BLD AUTO: 72.6 % — SIGNIFICANT CHANGE UP (ref 43–77)
NEUTROPHILS NFR BLD AUTO: 72.6 % — SIGNIFICANT CHANGE UP (ref 43–77)
NEUTROPHILS NFR BLD AUTO: 73.2 % — SIGNIFICANT CHANGE UP (ref 43–77)
NEUTROPHILS NFR BLD AUTO: 73.2 % — SIGNIFICANT CHANGE UP (ref 43–77)
PLATELET # BLD AUTO: 163 K/UL — SIGNIFICANT CHANGE UP (ref 150–400)
PLATELET # BLD AUTO: 163 K/UL — SIGNIFICANT CHANGE UP (ref 150–400)
PLATELET # BLD AUTO: 171 K/UL — SIGNIFICANT CHANGE UP (ref 150–400)
PLATELET # BLD AUTO: 171 K/UL — SIGNIFICANT CHANGE UP (ref 150–400)
POTASSIUM SERPL-MCNC: 3.7 MMOL/L — SIGNIFICANT CHANGE UP (ref 3.5–5.3)
POTASSIUM SERPL-MCNC: 3.7 MMOL/L — SIGNIFICANT CHANGE UP (ref 3.5–5.3)
POTASSIUM SERPL-MCNC: 4 MMOL/L — SIGNIFICANT CHANGE UP (ref 3.5–5.3)
POTASSIUM SERPL-MCNC: 4 MMOL/L — SIGNIFICANT CHANGE UP (ref 3.5–5.3)
POTASSIUM SERPL-SCNC: 3.7 MMOL/L — SIGNIFICANT CHANGE UP (ref 3.5–5.3)
POTASSIUM SERPL-SCNC: 3.7 MMOL/L — SIGNIFICANT CHANGE UP (ref 3.5–5.3)
POTASSIUM SERPL-SCNC: 4 MMOL/L — SIGNIFICANT CHANGE UP (ref 3.5–5.3)
POTASSIUM SERPL-SCNC: 4 MMOL/L — SIGNIFICANT CHANGE UP (ref 3.5–5.3)
PROT SERPL-MCNC: 7.5 GM/DL — SIGNIFICANT CHANGE UP (ref 6–8.3)
PROT SERPL-MCNC: 7.5 GM/DL — SIGNIFICANT CHANGE UP (ref 6–8.3)
PROTHROM AB SERPL-ACNC: 10.4 SEC — SIGNIFICANT CHANGE UP (ref 9.5–13)
PROTHROM AB SERPL-ACNC: 10.4 SEC — SIGNIFICANT CHANGE UP (ref 9.5–13)
RBC # BLD: 4.18 M/UL — SIGNIFICANT CHANGE UP (ref 3.8–5.2)
RBC # BLD: 4.18 M/UL — SIGNIFICANT CHANGE UP (ref 3.8–5.2)
RBC # BLD: 4.37 M/UL — SIGNIFICANT CHANGE UP (ref 3.8–5.2)
RBC # BLD: 4.37 M/UL — SIGNIFICANT CHANGE UP (ref 3.8–5.2)
RBC # FLD: 14.7 % — HIGH (ref 10.3–14.5)
RBC # FLD: 14.7 % — HIGH (ref 10.3–14.5)
RBC # FLD: 14.8 % — HIGH (ref 10.3–14.5)
RBC # FLD: 14.8 % — HIGH (ref 10.3–14.5)
SODIUM SERPL-SCNC: 138 MMOL/L — SIGNIFICANT CHANGE UP (ref 135–145)
SODIUM SERPL-SCNC: 138 MMOL/L — SIGNIFICANT CHANGE UP (ref 135–145)
SODIUM SERPL-SCNC: 139 MMOL/L — SIGNIFICANT CHANGE UP (ref 135–145)
SODIUM SERPL-SCNC: 139 MMOL/L — SIGNIFICANT CHANGE UP (ref 135–145)
TROPONIN I, HIGH SENSITIVITY RESULT: 9.77 NG/L — SIGNIFICANT CHANGE UP
TROPONIN I, HIGH SENSITIVITY RESULT: 9.77 NG/L — SIGNIFICANT CHANGE UP
WBC # BLD: 6.93 K/UL — SIGNIFICANT CHANGE UP (ref 3.8–10.5)
WBC # BLD: 6.93 K/UL — SIGNIFICANT CHANGE UP (ref 3.8–10.5)
WBC # BLD: 9.54 K/UL — SIGNIFICANT CHANGE UP (ref 3.8–10.5)
WBC # BLD: 9.54 K/UL — SIGNIFICANT CHANGE UP (ref 3.8–10.5)
WBC # FLD AUTO: 6.93 K/UL — SIGNIFICANT CHANGE UP (ref 3.8–10.5)
WBC # FLD AUTO: 6.93 K/UL — SIGNIFICANT CHANGE UP (ref 3.8–10.5)
WBC # FLD AUTO: 9.54 K/UL — SIGNIFICANT CHANGE UP (ref 3.8–10.5)
WBC # FLD AUTO: 9.54 K/UL — SIGNIFICANT CHANGE UP (ref 3.8–10.5)

## 2024-01-08 PROCEDURE — 85027 COMPLETE CBC AUTOMATED: CPT

## 2024-01-08 PROCEDURE — 83605 ASSAY OF LACTIC ACID: CPT

## 2024-01-08 PROCEDURE — G1004: CPT

## 2024-01-08 PROCEDURE — 97162 PT EVAL MOD COMPLEX 30 MIN: CPT | Mod: GP

## 2024-01-08 PROCEDURE — 72125 CT NECK SPINE W/O DYE: CPT | Mod: 26,MA

## 2024-01-08 PROCEDURE — 74176 CT ABD & PELVIS W/O CONTRAST: CPT

## 2024-01-08 PROCEDURE — 93010 ELECTROCARDIOGRAM REPORT: CPT

## 2024-01-08 PROCEDURE — 93306 TTE W/DOPPLER COMPLETE: CPT

## 2024-01-08 PROCEDURE — 36415 COLL VENOUS BLD VENIPUNCTURE: CPT

## 2024-01-08 PROCEDURE — 71250 CT THORAX DX C-: CPT | Mod: 26,77

## 2024-01-08 PROCEDURE — 71250 CT THORAX DX C-: CPT

## 2024-01-08 PROCEDURE — 84484 ASSAY OF TROPONIN QUANT: CPT

## 2024-01-08 PROCEDURE — 85610 PROTHROMBIN TIME: CPT

## 2024-01-08 PROCEDURE — 84443 ASSAY THYROID STIM HORMONE: CPT

## 2024-01-08 PROCEDURE — 83690 ASSAY OF LIPASE: CPT

## 2024-01-08 PROCEDURE — 81001 URINALYSIS AUTO W/SCOPE: CPT

## 2024-01-08 PROCEDURE — 85730 THROMBOPLASTIN TIME PARTIAL: CPT

## 2024-01-08 PROCEDURE — 97530 THERAPEUTIC ACTIVITIES: CPT | Mod: GP

## 2024-01-08 PROCEDURE — 70450 CT HEAD/BRAIN W/O DYE: CPT | Mod: 26,MH

## 2024-01-08 PROCEDURE — 70450 CT HEAD/BRAIN W/O DYE: CPT | Mod: MF

## 2024-01-08 PROCEDURE — 74176 CT ABD & PELVIS W/O CONTRAST: CPT | Mod: 26,77

## 2024-01-08 PROCEDURE — 80053 COMPREHEN METABOLIC PANEL: CPT

## 2024-01-08 PROCEDURE — 93005 ELECTROCARDIOGRAM TRACING: CPT

## 2024-01-08 PROCEDURE — 74176 CT ABD & PELVIS W/O CONTRAST: CPT | Mod: 26,MA

## 2024-01-08 PROCEDURE — 84436 ASSAY OF TOTAL THYROXINE: CPT

## 2024-01-08 PROCEDURE — 80307 DRUG TEST PRSMV CHEM ANLYZR: CPT

## 2024-01-08 PROCEDURE — 80048 BASIC METABOLIC PNL TOTAL CA: CPT

## 2024-01-08 PROCEDURE — 97116 GAIT TRAINING THERAPY: CPT | Mod: GP

## 2024-01-08 PROCEDURE — 83735 ASSAY OF MAGNESIUM: CPT

## 2024-01-08 PROCEDURE — 85025 COMPLETE CBC W/AUTO DIFF WBC: CPT

## 2024-01-08 PROCEDURE — 84100 ASSAY OF PHOSPHORUS: CPT

## 2024-01-08 PROCEDURE — 99291 CRITICAL CARE FIRST HOUR: CPT

## 2024-01-08 PROCEDURE — 71250 CT THORAX DX C-: CPT | Mod: 26,MA

## 2024-01-08 PROCEDURE — 99223 1ST HOSP IP/OBS HIGH 75: CPT | Mod: GC

## 2024-01-08 RX ORDER — LINACLOTIDE 145 UG/1
1 CAPSULE, GELATIN COATED ORAL
Refills: 0 | DISCHARGE

## 2024-01-08 RX ORDER — IRBESARTAN 75 MG/1
1 TABLET ORAL
Qty: 0 | Refills: 0 | DISCHARGE

## 2024-01-08 RX ORDER — OLANZAPINE 15 MG/1
1 TABLET, FILM COATED ORAL
Qty: 0 | Refills: 0 | DISCHARGE

## 2024-01-08 RX ORDER — ATENOLOL 25 MG/1
25 TABLET ORAL DAILY
Refills: 0 | Status: DISCONTINUED | OUTPATIENT
Start: 2024-01-08 | End: 2024-01-09

## 2024-01-08 RX ORDER — MIRTAZAPINE 45 MG/1
7.5 TABLET, ORALLY DISINTEGRATING ORAL DAILY
Refills: 0 | Status: DISCONTINUED | OUTPATIENT
Start: 2024-01-08 | End: 2024-01-10

## 2024-01-08 RX ORDER — HYDRALAZINE HCL 50 MG
5 TABLET ORAL ONCE
Refills: 0 | Status: COMPLETED | OUTPATIENT
Start: 2024-01-08 | End: 2024-01-08

## 2024-01-08 RX ORDER — SODIUM CHLORIDE 9 MG/ML
250 INJECTION INTRAMUSCULAR; INTRAVENOUS; SUBCUTANEOUS ONCE
Refills: 0 | Status: COMPLETED | OUTPATIENT
Start: 2024-01-08 | End: 2024-01-08

## 2024-01-08 RX ORDER — OLANZAPINE 15 MG/1
5 TABLET, FILM COATED ORAL AT BEDTIME
Refills: 0 | Status: DISCONTINUED | OUTPATIENT
Start: 2024-01-08 | End: 2024-01-10

## 2024-01-08 RX ORDER — TETANUS TOXOID, REDUCED DIPHTHERIA TOXOID AND ACELLULAR PERTUSSIS VACCINE, ADSORBED 5; 2.5; 8; 8; 2.5 [IU]/.5ML; [IU]/.5ML; UG/.5ML; UG/.5ML; UG/.5ML
0.5 SUSPENSION INTRAMUSCULAR ONCE
Refills: 0 | Status: COMPLETED | OUTPATIENT
Start: 2024-01-08 | End: 2024-01-08

## 2024-01-08 RX ORDER — LOSARTAN POTASSIUM 100 MG/1
100 TABLET, FILM COATED ORAL DAILY
Refills: 0 | Status: DISCONTINUED | OUTPATIENT
Start: 2024-01-08 | End: 2024-01-10

## 2024-01-08 RX ORDER — MIRTAZAPINE 45 MG/1
1 TABLET, ORALLY DISINTEGRATING ORAL
Refills: 0 | DISCHARGE

## 2024-01-08 RX ORDER — SODIUM CHLORIDE 9 MG/ML
1000 INJECTION INTRAMUSCULAR; INTRAVENOUS; SUBCUTANEOUS
Refills: 0 | Status: DISCONTINUED | OUTPATIENT
Start: 2024-01-08 | End: 2024-01-10

## 2024-01-08 RX ORDER — CHLORHEXIDINE GLUCONATE 213 G/1000ML
1 SOLUTION TOPICAL
Refills: 0 | Status: DISCONTINUED | OUTPATIENT
Start: 2024-01-08 | End: 2024-01-10

## 2024-01-08 RX ORDER — ATENOLOL 25 MG/1
1 TABLET ORAL
Refills: 0 | DISCHARGE

## 2024-01-08 RX ORDER — DOCUSATE SODIUM 100 MG
2 CAPSULE ORAL
Refills: 0 | DISCHARGE

## 2024-01-08 RX ORDER — SENNA PLUS 8.6 MG/1
2 TABLET ORAL AT BEDTIME
Refills: 0 | Status: DISCONTINUED | OUTPATIENT
Start: 2024-01-08 | End: 2024-01-10

## 2024-01-08 RX ORDER — MAGNESIUM OXIDE 400 MG ORAL TABLET 241.3 MG
1 TABLET ORAL
Refills: 0 | DISCHARGE

## 2024-01-08 RX ADMIN — Medication 100 MILLIGRAM(S): at 16:25

## 2024-01-08 RX ADMIN — MIRTAZAPINE 7.5 MILLIGRAM(S): 45 TABLET, ORALLY DISINTEGRATING ORAL at 23:56

## 2024-01-08 RX ADMIN — TETANUS TOXOID, REDUCED DIPHTHERIA TOXOID AND ACELLULAR PERTUSSIS VACCINE, ADSORBED 0.5 MILLILITER(S): 5; 2.5; 8; 8; 2.5 SUSPENSION INTRAMUSCULAR at 16:24

## 2024-01-08 RX ADMIN — Medication 5 MILLIGRAM(S): at 19:49

## 2024-01-08 RX ADMIN — SODIUM CHLORIDE 250 MILLILITER(S): 9 INJECTION INTRAMUSCULAR; INTRAVENOUS; SUBCUTANEOUS at 16:24

## 2024-01-08 RX ADMIN — SODIUM CHLORIDE 80 MILLILITER(S): 9 INJECTION INTRAMUSCULAR; INTRAVENOUS; SUBCUTANEOUS at 23:58

## 2024-01-08 RX ADMIN — SENNA PLUS 2 TABLET(S): 8.6 TABLET ORAL at 23:58

## 2024-01-08 RX ADMIN — OLANZAPINE 5 MILLIGRAM(S): 15 TABLET, FILM COATED ORAL at 23:57

## 2024-01-08 NOTE — CONSULT NOTE ADULT - SUBJECTIVE AND OBJECTIVE BOX
Patient is a 88y old  Female who presents with a chief complaint of fall at home    HPI: Kathy Abraham is an 88 year old Female with a past medical history of Dementia, HTN not on any blood thinners or anti-platelet agents who presents after a fall at home. Per the home health aid at bedside, she heard a thump in the next room. When she arrived in the next room, she saw Ms. Abraham on the floor with no loss of consciousness as well as a 15 cm laceration in the occiput. At this time, the patient denies any headaches, nausea, vomiting. Neurosurgery consulted for CTH finding of left frontal SAH.       PAST MEDICAL & SURGICAL HISTORY:  HTN (hypertension)      Hemorrhoids      Fecal impaction      MVP (mitral valve prolapse)      Dizziness      No significant past surgical history      No significant past surgical history          FAMILY HISTORY:  No pertinent family history in first degree relatives    No pertinent family history in first degree relatives        Social Hx:  Nonsmoker, no drug or alcohol use    MEDICATIONS  (STANDING):  diphtheria/tetanus/pertussis (acellular) Vaccine (Adacel) 0.5 milliLiter(s) IntraMuscular once  sodium chloride 0.9% Bolus 250 milliLiter(s) IV Bolus once       Allergies    penicillins (Unknown)  Gantrisin (Unknown)    Intolerances    sulfa drugs (Unknown)      ROS: Pertinent positives in HPI, all other ROS were reviewed and are negative.      Vital Signs Last 24 Hrs  T(C): 36.7 (08 Jan 2024 14:51), Max: 36.7 (08 Jan 2024 14:51)  T(F): 98.1 (08 Jan 2024 14:51), Max: 98.1 (08 Jan 2024 14:51)  HR: 59 (08 Jan 2024 14:51) (59 - 59)  BP: 202/78 (08 Jan 2024 14:51) (202/78 - 202/78)  BP(mean): --  RR: 20 (08 Jan 2024 14:51) (20 - 20)  SpO2: 97% (08 Jan 2024 14:51) (97% - 97%)    Parameters below as of 08 Jan 2024 14:51  Patient On (Oxygen Delivery Method): room air            Constitutional: awake and alert.  HEENT: PERRLA, EOMI,   Neck: Supple.  Respiratory: Breath sounds are clear bilaterally  Cardiovascular: S1 and S2, regular / irregular rhythm  Gastrointestinal: soft, nontender  Extremities:  no edema  Vascular: Caritid Bruit - no  Musculoskeletal: no joint swelling/tenderness, no abnormal movements  Skin: No rashes    Neurological exam:  HF: A x O x 3. Appropriately interactive, normal affect. Speech fluent, No Aphasia or paraphasic errors. Naming /repetition intact   CN: SHASTA, EOMI, VFF, facial sensation normal, no NLFD, tongue midline, Palate moves equally, SCM equal bilaterally  Motor: No pronator drift, Strength 5/5 in all 4 ext, normal bulk and tone, no tremor, rigidity or bradykinesia.    Sens: Intact to light touch / PP/ VS/ JS    Reflexes: Symmetric and normal . BJ 2+, BR 2+, KJ 2+, AJ 2+, downgoing toes b/l  Coord:  No FNFA, dysmetria, ARTHUR intact   Gait/Balance: Normal/Cannot test    NIHSS:          Labs:                 Radiology:  - CT Head:  - MRI brain  -MRA brain/Carotids  - EEG    A/P:    No IV tpa given because…    -ASA/PLAVIX  -Atorvastatin  -DVT prophylaxis  -Dysphagia screen  -Speech and swallow eval  -PT eval/ rehab eval    Mangement d/w Pt / family /     Total Critical Care Time spent:   Patient is a 88y old  Female who presents with a chief complaint of fall at home    HPI: Kathy Abraham is an 88 year old Female with a past medical history of HTN not on any blood thinners or anti-platelet agents who presents after a fall at home. Patient states that she was in the bathroom when she felt unsteady and dizzy and fell onto the ground with no laceration.  At this time, the patient denies any headaches, nausea, vomiting. Neurosurgery consulted for CTH finding of left frontal SAH.       PAST MEDICAL & SURGICAL HISTORY:  HTN (hypertension)      Hemorrhoids      Fecal impaction      MVP (mitral valve prolapse)      Dizziness      No significant past surgical history      No significant past surgical history          FAMILY HISTORY:  No pertinent family history in first degree relatives    No pertinent family history in first degree relatives        Social Hx:  Nonsmoker, no drug or alcohol use    MEDICATIONS  (STANDING):  diphtheria/tetanus/pertussis (acellular) Vaccine (Adacel) 0.5 milliLiter(s) IntraMuscular once  sodium chloride 0.9% Bolus 250 milliLiter(s) IV Bolus once       Allergies    penicillins (Unknown)  Gantrisin (Unknown)    Intolerances    sulfa drugs (Unknown)      ROS: Pertinent positives in HPI, all other ROS were reviewed and are negative.      Vital Signs Last 24 Hrs  T(C): 36.7 (08 Jan 2024 14:51), Max: 36.7 (08 Jan 2024 14:51)  T(F): 98.1 (08 Jan 2024 14:51), Max: 98.1 (08 Jan 2024 14:51)  HR: 59 (08 Jan 2024 14:51) (59 - 59)  BP: 202/78 (08 Jan 2024 14:51) (202/78 - 202/78)  BP(mean): --  RR: 20 (08 Jan 2024 14:51) (20 - 20)  SpO2: 97% (08 Jan 2024 14:51) (97% - 97%)    Parameters below as of 08 Jan 2024 14:51  Patient On (Oxygen Delivery Method): room air            Constitutional: awake and alert.  HEENT: PERRLA, EOMI,   Neck: Supple.  Respiratory: No respiratory distress  Gastrointestinal: soft, nontender  Extremities:  no edema  Musculoskeletal: no joint swelling/tenderness, no abnormal movements  Skin: laceration noted in the occiput    Neurological exam:  HF: A x O x 3. Appropriately interactive, normal affect. Speech fluent, No Aphasia or paraphasic errors. Naming /repetition intact   CN: SHASTA, EOMI, VFF, facial sensation normal, no NLFD, tongue midline, Palate moves equally, SCM equal bilaterally  Motor: No pronator drift, Strength 5/5 in all 4 ext, normal bulk and tone, no tremor, rigidity or bradykinesia.    Sens: Intact to light touch / PP/ VS/ JS    Reflexes: Symmetric and normal . BJ 2+, BR 2+, KJ 2+, AJ 2+, downgoing toes b/l  Coord:  No FNFA, dysmetria, ARTHUR intact   Gait/Balance: Cannot test    Radiology:  CT Head: Patient is a 88y old  Female who presents with a chief complaint of fall at home    HPI: Kathy Abraham is an 88 year old Female with a past medical history of HTN not on any blood thinners or anti-platelet agents who presents after a fall at home. Patient states that she was in the bathroom when she felt unsteady and dizzy and fell onto the ground with no laceration.  At this time, the patient denies any headaches, nausea, vomiting. Neurosurgery consulted for CTH finding of left frontal SAH.       PAST MEDICAL & SURGICAL HISTORY:  HTN (hypertension)      Hemorrhoids      Fecal impaction      MVP (mitral valve prolapse)      Dizziness      No significant past surgical history      No significant past surgical history          FAMILY HISTORY:  No pertinent family history in first degree relatives    No pertinent family history in first degree relatives        Social Hx:  Nonsmoker, no drug or alcohol use    MEDICATIONS  (STANDING):  diphtheria/tetanus/pertussis (acellular) Vaccine (Adacel) 0.5 milliLiter(s) IntraMuscular once  sodium chloride 0.9% Bolus 250 milliLiter(s) IV Bolus once       Allergies    penicillins (Unknown)  Gantrisin (Unknown)    Intolerances    sulfa drugs (Unknown)      ROS: Pertinent positives in HPI, all other ROS were reviewed and are negative.      Vital Signs Last 24 Hrs  T(C): 36.7 (08 Jan 2024 14:51), Max: 36.7 (08 Jan 2024 14:51)  T(F): 98.1 (08 Jan 2024 14:51), Max: 98.1 (08 Jan 2024 14:51)  HR: 59 (08 Jan 2024 14:51) (59 - 59)  BP: 202/78 (08 Jan 2024 14:51) (202/78 - 202/78)  BP(mean): --  RR: 20 (08 Jan 2024 14:51) (20 - 20)  SpO2: 97% (08 Jan 2024 14:51) (97% - 97%)    Parameters below as of 08 Jan 2024 14:51  Patient On (Oxygen Delivery Method): room air            Constitutional: awake and alert.  HEENT: PERRLA, EOMI,   Neck: Supple.  Respiratory: No respiratory distress  Gastrointestinal: soft, nontender  Extremities:  no edema  Musculoskeletal: no joint swelling/tenderness, no abnormal movements  Skin: laceration noted in the occiput    Neurological exam:  HF: A x O x 3. Appropriately interactive, normal affect. Speech fluent, No Aphasia or paraphasic errors. Naming /repetition intact   CN: SHASTA, EOMI, VFF, facial sensation normal, no NLFD, tongue midline, Palate moves equally, SCM equal bilaterally  Motor: No pronator drift, Strength 5/5 in all 4 ext, normal bulk and tone, no tremor, rigidity or bradykinesia.    Sens: Intact to light touch / PP/ VS/ JS    Reflexes: Symmetric and normal . BJ 2+, BR 2+, KJ 2+, AJ 2+, downgoing toes b/l  Coord:  No FNFA, dysmetria, ARTHUR intact   Gait/Balance: Cannot test    Radiology:  CT Head:  Parenchymal volume loss and chronic microvascular ischemic changes are   again seen    Acute subarachnoid hemorrhage is seen involving the high left frontal   region.    No significant shift or herniation is seen.    Evaluation of the osseous appropriate window appears normal    The visualized paraspinal sinuses mastoid and middle ear regions appear   clear.    Impression: Subarachnoid hemorrhage as described above.

## 2024-01-08 NOTE — PHARMACOTHERAPY INTERVENTION NOTE - COMMENTS
Medication reconciliation completed.  Reviewed Medication list and confirmed med allergies with patient and pt's Aide at bedside; confirmed with Dr. First [employer].

## 2024-01-08 NOTE — ED PROVIDER NOTE - NS_ ATTENDINGSCRIBEDETAILS _ED_A_ED_FT
I, Olga Myers DO,  performed the initial face to face bedside interview with this patient regarding history of present illness, review of symptoms and relevant past medical, social and family history.  I completed an independent physical examination.  I was the initial provider who evaluated this patient.   I personally saw the patient and performed a substantive portion of the visit including all aspects of the medical decision making.  I have signed out the follow up of any pending tests (i.e. labs, radiological studies) to the NOEL.  I have communicated the patient’s plan of care and disposition with the NOEL.  The history, relevant review of systems, past medical and surgical history, medical decision making, and physical examination was documented by the scribe in my presence and I attest to the accuracy of the documentation.

## 2024-01-08 NOTE — CONSULT NOTE ADULT - SUBJECTIVE AND OBJECTIVE BOX
Patient is a 88y old  Female who presents with a chief complaint of     BRIEF HOSPITAL COURSE-    Events last 24 hours:       Review of Systems:  CONSTITUTIONAL: No fever, chills, or fatigue  EYES: No eye pain, visual disturbances, or discharge  ENMT:  No difficulty hearing, tinnitus, vertigo; No sinus or throat pain  NECK: No pain or stiffness  RESPIRATORY: No cough, wheezing, chills or hemoptysis; No shortness of breath  CARDIOVASCULAR: No chest pain, palpitations, dizziness, or leg swelling  GASTROINTESTINAL: No abdominal or epigastric pain. No nausea, vomiting, or hematemesis; No diarrhea or constipation. No melena or hematochezia.  GENITOURINARY: No dysuria, frequency, hematuria, or incontinence  NEUROLOGICAL: No headaches, memory loss, loss of strength, numbness, or tremors  SKIN: No itching, burning, rashes, or lesions   MUSCULOSKELETAL: No joint pain or swelling; No muscle, back, or extremity pain  PSYCHIATRIC: No depression, anxiety, mood swings, or difficulty sleeping      PAST MEDICAL & SURGICAL HISTORY-  HTN (hypertension)      Hemorrhoids      Fecal impaction      MVP (mitral valve prolapse)      Dizziness      No significant past surgical history      No significant past surgical history          Medications:    atenolol  Tablet 25 milliGRAM(s) Oral daily PRN  losartan 50 milliGRAM(s) Oral daily    mirtazapine 7.5 milliGRAM(s) Oral daily  OLANZapine Disintegrating Tablet 5 milliGRAM(s) Oral at bedtime    senna 2 Tablet(s) Oral at bedtime    sodium chloride 0.9%. 1000 milliLiter(s) IV Continuous <Continuous>    chlorhexidine 4% Liquid 1 Application(s) Topical <User Schedule>        ICU Vital Signs Last 24 Hrs  T(C): 36.7 (08 Jan 2024 14:51), Max: 36.7 (08 Jan 2024 14:51)  T(F): 98.1 (08 Jan 2024 14:51), Max: 98.1 (08 Jan 2024 14:51)  HR: 59 (08 Jan 2024 14:51) (59 - 59)  BP: 202/78 (08 Jan 2024 14:51) (202/78 - 202/78)  BP(mean): --  ABP: --  ABP(mean): --  RR: 20 (08 Jan 2024 14:51) (20 - 20)  SpO2: 97% (08 Jan 2024 14:51) (97% - 97%)    O2 Parameters below as of 08 Jan 2024 14:51  Patient On (Oxygen Delivery Method): room air      I&O's Detail        LABS:                        12.8   6.93  )-----------( 171      ( 08 Jan 2024 13:56 )             40.0     138  |  103  |  26<H>  ----------------------------<  126<H>  3.7   |  31  |  1.02    Ca    9.8      08 Jan 2024 13:56    TPro  7.5  /  Alb  3.5  /  TBili  0.5  /  DBili  x   /  AST  44<H>  /  ALT  45  /  AlkPhos  91  01-08      CAPILLARY BLOOD GLUCOSE      PT/INR - ( 08 Jan 2024 13:56 )   PT: 10.4 sec;   INR: 0.92 ratio    PTT - ( 08 Jan 2024 13:56 )  PTT:28.2 sec      Urinalysis Basic - ( 08 Jan 2024 13:56 )  Color: x / Appearance: x / SG: x / pH: x  Gluc: 126 mg/dL / Ketone: x  / Bili: x / Urobili: x   Blood: x / Protein: x / Nitrite: x   Leuk Esterase: x / RBC: x / WBC x   Sq Epi: x / Non Sq Epi: x / Bacteria: x      CULTURES:      Physical Examination:  General: No acute distress.    HEENT: Pupils equal, reactive to light.  Symmetric.  PULM: Clear to auscultation bilaterally, no significant sputum production  NECK: Supple, no lymphadenopathy, trachea midline  CVS: Regular rate and rhythm, no murmurs, rubs, or gallops  ABD: Soft, nondistended, nontender, normoactive bowel sounds, no masses  EXT: No edema, nontender  SKIN: Warm and well perfused, no rashes noted.  NEURO: Alert, oriented, interactive, nonfocal  DEVICES:       RADIOLOGY-    < from: CT Cervical Spine No Cont (01.08.24 @ 15:55) >  ACC: [id number] EXAM:  CT CERVICAL SPINE   ORDERED BY: DELIO GUADALUPE     PROCEDURE DATE:  01/08/2024      INTERPRETATION:  Clinical indication: Trauma.    Multiple axial sections were performed through the cervical spine.   Coronal and sagittalreconstructions were performed    This exam is compared prior cervical spine CT performed on November 12, 2022.    Loss of the normal cervical lordosis is identified    Demineralization of the bones are seen.    C4 is slightly anteriorly displaced relative to C5 and C7 is slightly   increased coastal to T1. These findings likely the result of chronic   degenerative changes    Disc space narrowing and plate changes and osteophytes are seen involving   the cervical spine region. Vacuum disc change is seen involving the C4-5   C5-6 and C6-7 levels. These findings are secondary to chronic   degenerative changes.    Bilateral hypertrophic facet joint changes are seen involving multiple   levels secondary to chronic degenerative changes.    Thereare no fractures or dislocations identified    Lucencies are seen the right clivus is again identified as well as the   anterior aspect of C1 right side. These lucencies appear stable when   compared prior exam and could be compatible degenerative change. MRI can   be done to better characterize these findings if clinically indicated and   if there are no contraindications.    No acute fractures or dislocations are seen    Evaluation of the paraspinal soft tissues is limited by lack of IV   contrast though grossly unremarkable.    The visualized portions of both lung apices appear clear.    IMPRESSION: Degenerative changes involving the cervical spine is again   seen and increased when compared with compared with the prior exam.    --- End ofReport ---    JAMEEL ARAGON MD; Attending Radiologist  This document has been electronically signed. Jan 8 2024  4:27PM    < end of copied text >        < from: CT Chest No Cont (01.08.24 @ 15:55) >  ACC: [id number] EXAM:  CT ABDOMEN AND PELVIS   ORDERED BY: DELIO GUADALUPE     ACC: [id number] EXAM:  CT CHEST   ORDERED BY: DELIO GUADALUPE     PROCEDURE DATE:  01/08/2024      INTERPRETATION:  CLINICAL INFORMATION: Trauma    COMPARISON: 10/17/2021 and 8/1/2021    CONTRAST/COMPLICATIONS:  IV Contrast: NONE  Oral Contrast: NONE  Complications: None reported at time of study completion    PROCEDURE:  CT of the Chest, Abdomen and Pelvis was performed.  Sagittal and coronal reformats were performed.    FINDINGS:    Please note that evaluation of the chest/abdominal organs and vascular   structures is limited by lack of intravenous contrast.    CHEST:  LUNGS AND LARGE AIRWAYS: Patent central airways. Bilateral dependent and   basilar atelectasis. Stable 3-4 mm left lower lobe nodule. No lung   consolidations.  PLEURA: No pleural effusion or pneumothorax.  VESSELS: Atherosclerotic calcifications of thoracic aorta and coronary   arteries.  HEART: Heart size is normal. Aortic valve and mitral annulus   calcifications. No pericardial effusion.  MEDIASTINUM AND VERN: No lymphadenopathy.  CHEST WALL AND LOWER NECK: Within normal limits.    ABDOMEN AND PELVIS:  LIVER: Subcentimeter hypodensity, too small to further characterize.  BILE DUCTS: Normal caliber.  GALLBLADDER: Within normal limits.  SPLEEN: Within normal limits.  PANCREAS: Stable ductal prominence. 5 mm pancreatic head hypodense lesion   appears stable.  ADRENALS: Nonspecific left adrenal thickening.  KIDNEYS/URETERS: Bilateral nonobstructive renal calculi. No   hydronephrosis.    BLADDER: Contains layering calculi.  REPRODUCTIVE ORGANS: Uterus and adnexa within normal limits.    BOWEL: No bowel obstruction. Appendix is not visualized. No evidence of   inflammation in the pericecal region.  PERITONEUM: No ascites.  VESSELS: Within normal limits.  RETROPERITONEUM/LYMPH NODES: No lymphadenopathy.  ABDOMINAL WALL: Within normal limits.  BONES: Degenerative changes. Sternal fixation hardware. Stable T3 and T4   wedge deformity.    IMPRESSION:  No findings suspicious for acute intrathoracic or intra-abdominal   visceral injury.      --- End of Report --    EVA JENNINGS MD; Attending Radiologist  This document has been electronically signed. Jan 8 2024  4:51PM    < end of copied text >        < from: CT Head No Cont (01.08.24 @ 14:57) >  ACC: [id number] EXAM:  CT BRAIN   ORDERED BY: SILVIANO ALVAREZ     PROCEDURE DATE:  01/08/2024      INTERPRETATION:  Clinical indication: Fall.    Multiple axial sections were performed from the base of skull to vertex   without contrast enhancement. Coronal and sagittal reconstructions were   performed    This exam is dated prior head CT performed on November 12, 2022    Parenchymal volume loss and chronic microvascular ischemic changes are   again seen    Acute subarachnoid hemorrhage is seen involving the high left frontal   region.    No significant shift or herniation is seen.    Evaluation of the osseous appropriate window appears normal    The visualized paraspinal sinuses mastoid and middle ear regions appear   clear.    Impression: Subarachnoid hemorrhage as described above.    Findings discussed with Dr Pederson on January 2024 3:06 PM  with read back.    --- End of Report ---      JAMEEL ARAGON MD; Attending Radiologist  This document has been electronically signed. Jan 8 2024  3:06PM    < end of copied text >      CRITICAL CARE TIME SPENT: ** minutes assessing presenting problems of acute illness, which pose high probability of life threatening deterioration or end organ damage/dysfunction, as well as medical decision making including initiating plan of care, reviewing data, reviewing radiologic exams, discussing with multidisciplinary team,  discussing goals of care with patient/family, and writing this note.  Non-inclusive of procedures performed,      DATE OF ENTRY OF THIS NOTE IS EQUAL TO THE DATE OF SERVICES RENDERED Patient is a 88y old  Female who presents with a chief complaint of     BRIEF HOSPITAL COURSE-    Events last 24 hours:       Review of Systems:  CONSTITUTIONAL: No fever, chills, or fatigue  EYES: No eye pain, visual disturbances, or discharge  ENMT:  No difficulty hearing, tinnitus, vertigo; No sinus or throat pain  NECK: No pain or stiffness  RESPIRATORY: No cough, wheezing, chills or hemoptysis; No shortness of breath  CARDIOVASCULAR: No chest pain, palpitations, dizziness, or leg swelling  GASTROINTESTINAL: No abdominal or epigastric pain. No nausea, vomiting, or hematemesis; No diarrhea or constipation. No melena or hematochezia.  GENITOURINARY: No dysuria, frequency, hematuria, or incontinence  NEUROLOGICAL: No headaches, memory loss, loss of strength, numbness, or tremors  SKIN: No itching, burning, rashes, or lesions   MUSCULOSKELETAL: No joint pain or swelling; No muscle, back, or extremity pain  PSYCHIATRIC: No depression, anxiety, mood swings, or difficulty sleeping      PAST MEDICAL & SURGICAL HISTORY-  HTN (hypertension)      Hemorrhoids      Fecal impaction      MVP (mitral valve prolapse)      Dizziness      No significant past surgical history      No significant past surgical history          Medications:    atenolol  Tablet 25 milliGRAM(s) Oral daily PRN  losartan 50 milliGRAM(s) Oral daily    mirtazapine 7.5 milliGRAM(s) Oral daily  OLANZapine Disintegrating Tablet 5 milliGRAM(s) Oral at bedtime    senna 2 Tablet(s) Oral at bedtime    sodium chloride 0.9%. 1000 milliLiter(s) IV Continuous <Continuous>    chlorhexidine 4% Liquid 1 Application(s) Topical <User Schedule>        ICU Vital Signs Last 24 Hrs  T(C): 36.7 (08 Jan 2024 14:51), Max: 36.7 (08 Jan 2024 14:51)  T(F): 98.1 (08 Jan 2024 14:51), Max: 98.1 (08 Jan 2024 14:51)  HR: 59 (08 Jan 2024 14:51) (59 - 59)  BP: 202/78 (08 Jan 2024 14:51) (202/78 - 202/78)  BP(mean): --  ABP: --  ABP(mean): --  RR: 20 (08 Jan 2024 14:51) (20 - 20)  SpO2: 97% (08 Jan 2024 14:51) (97% - 97%)    O2 Parameters below as of 08 Jan 2024 14:51  Patient On (Oxygen Delivery Method): room air      I&O's Detail        LABS:                        12.8   6.93  )-----------( 171      ( 08 Jan 2024 13:56 )             40.0     138  |  103  |  26<H>  ----------------------------<  126<H>  3.7   |  31  |  1.02    Ca    9.8      08 Jan 2024 13:56    TPro  7.5  /  Alb  3.5  /  TBili  0.5  /  DBili  x   /  AST  44<H>  /  ALT  45  /  AlkPhos  91  01-08      CAPILLARY BLOOD GLUCOSE      PT/INR - ( 08 Jan 2024 13:56 )   PT: 10.4 sec;   INR: 0.92 ratio    PTT - ( 08 Jan 2024 13:56 )  PTT:28.2 sec      Urinalysis Basic - ( 08 Jan 2024 13:56 )  Color: x / Appearance: x / SG: x / pH: x  Gluc: 126 mg/dL / Ketone: x  / Bili: x / Urobili: x   Blood: x / Protein: x / Nitrite: x   Leuk Esterase: x / RBC: x / WBC x   Sq Epi: x / Non Sq Epi: x / Bacteria: x      CULTURES:      Physical Examination:  General: No acute distress.    HEENT: Pupils equal, reactive to light.  Symmetric.  PULM: Clear to auscultation bilaterally, no significant sputum production  NECK: Supple, no lymphadenopathy, trachea midline  CVS: Regular rate and rhythm, no murmurs, rubs, or gallops  ABD: Soft, nondistended, nontender, normoactive bowel sounds, no masses  EXT: No edema, nontender  SKIN: Warm and well perfused, no rashes noted.  NEURO: Alert, oriented, interactive, nonfocal  DEVICES:       RADIOLOGY-    < from: CT Cervical Spine No Cont (01.08.24 @ 15:55) >  ACC: [id number] EXAM:  CT CERVICAL SPINE   ORDERED BY: DELIO GUADALUPE     PROCEDURE DATE:  01/08/2024      INTERPRETATION:  Clinical indication: Trauma.    Multiple axial sections were performed through the cervical spine.   Coronal and sagittalreconstructions were performed    This exam is compared prior cervical spine CT performed on November 12, 2022.    Loss of the normal cervical lordosis is identified    Demineralization of the bones are seen.    C4 is slightly anteriorly displaced relative to C5 and C7 is slightly   increased coastal to T1. These findings likely the result of chronic   degenerative changes    Disc space narrowing and plate changes and osteophytes are seen involving   the cervical spine region. Vacuum disc change is seen involving the C4-5   C5-6 and C6-7 levels. These findings are secondary to chronic   degenerative changes.    Bilateral hypertrophic facet joint changes are seen involving multiple   levels secondary to chronic degenerative changes.    Thereare no fractures or dislocations identified    Lucencies are seen the right clivus is again identified as well as the   anterior aspect of C1 right side. These lucencies appear stable when   compared prior exam and could be compatible degenerative change. MRI can   be done to better characterize these findings if clinically indicated and   if there are no contraindications.    No acute fractures or dislocations are seen    Evaluation of the paraspinal soft tissues is limited by lack of IV   contrast though grossly unremarkable.    The visualized portions of both lung apices appear clear.    IMPRESSION: Degenerative changes involving the cervical spine is again   seen and increased when compared with compared with the prior exam.    --- End ofReport ---    JAMEEL ARAGON MD; Attending Radiologist  This document has been electronically signed. Jan 8 2024  4:27PM    < end of copied text >        < from: CT Chest No Cont (01.08.24 @ 15:55) >  ACC: [id number] EXAM:  CT ABDOMEN AND PELVIS   ORDERED BY: DELIO GUADALUPE     ACC: [id number] EXAM:  CT CHEST   ORDERED BY: DELIO GUADALUPE     PROCEDURE DATE:  01/08/2024      INTERPRETATION:  CLINICAL INFORMATION: Trauma    COMPARISON: 10/17/2021 and 8/1/2021    CONTRAST/COMPLICATIONS:  IV Contrast: NONE  Oral Contrast: NONE  Complications: None reported at time of study completion    PROCEDURE:  CT of the Chest, Abdomen and Pelvis was performed.  Sagittal and coronal reformats were performed.    FINDINGS:    Please note that evaluation of the chest/abdominal organs and vascular   structures is limited by lack of intravenous contrast.    CHEST:  LUNGS AND LARGE AIRWAYS: Patent central airways. Bilateral dependent and   basilar atelectasis. Stable 3-4 mm left lower lobe nodule. No lung   consolidations.  PLEURA: No pleural effusion or pneumothorax.  VESSELS: Atherosclerotic calcifications of thoracic aorta and coronary   arteries.  HEART: Heart size is normal. Aortic valve and mitral annulus   calcifications. No pericardial effusion.  MEDIASTINUM AND VERN: No lymphadenopathy.  CHEST WALL AND LOWER NECK: Within normal limits.    ABDOMEN AND PELVIS:  LIVER: Subcentimeter hypodensity, too small to further characterize.  BILE DUCTS: Normal caliber.  GALLBLADDER: Within normal limits.  SPLEEN: Within normal limits.  PANCREAS: Stable ductal prominence. 5 mm pancreatic head hypodense lesion   appears stable.  ADRENALS: Nonspecific left adrenal thickening.  KIDNEYS/URETERS: Bilateral nonobstructive renal calculi. No   hydronephrosis.    BLADDER: Contains layering calculi.  REPRODUCTIVE ORGANS: Uterus and adnexa within normal limits.    BOWEL: No bowel obstruction. Appendix is not visualized. No evidence of   inflammation in the pericecal region.  PERITONEUM: No ascites.  VESSELS: Within normal limits.  RETROPERITONEUM/LYMPH NODES: No lymphadenopathy.  ABDOMINAL WALL: Within normal limits.  BONES: Degenerative changes. Sternal fixation hardware. Stable T3 and T4   wedge deformity.    IMPRESSION:  No findings suspicious for acute intrathoracic or intra-abdominal   visceral injury.      --- End of Report --    EVA JENNINGS MD; Attending Radiologist  This document has been electronically signed. Jan 8 2024  4:51PM    < end of copied text >        < from: CT Head No Cont (01.08.24 @ 14:57) >  ACC: [id number] EXAM:  CT BRAIN   ORDERED BY: SILVIANO ALVAREZ     PROCEDURE DATE:  01/08/2024      INTERPRETATION:  Clinical indication: Fall.    Multiple axial sections were performed from the base of skull to vertex   without contrast enhancement. Coronal and sagittal reconstructions were   performed    This exam is dated prior head CT performed on November 12, 2022    Parenchymal volume loss and chronic microvascular ischemic changes are   again seen    Acute subarachnoid hemorrhage is seen involving the high left frontal   region.    No significant shift or herniation is seen.    Evaluation of the osseous appropriate window appears normal    The visualized paraspinal sinuses mastoid and middle ear regions appear   clear.    Impression: Subarachnoid hemorrhage as described above.    Findings discussed with Dr Pederson on January 2024 3:06 PM  with read back.    --- End of Report ---      JAMEEL ARAGON MD; Attending Radiologist  This document has been electronically signed. Jan 8 2024  3:06PM    < end of copied text >      CRITICAL CARE TIME SPENT: ** minutes assessing presenting problems of acute illness, which pose high probability of life threatening deterioration or end organ damage/dysfunction, as well as medical decision making including initiating plan of care, reviewing data, reviewing radiologic exams, discussing with multidisciplinary team,  discussing goals of care with patient/family, and writing this note.  Non-inclusive of procedures performed,      DATE OF ENTRY OF THIS NOTE IS EQUAL TO THE DATE OF SERVICES RENDERED Patient is a 88y old  Female who presents with a chief complaint of     BRIEF HOSPITAL COURSE-      Events last 24 hours:       Review of Systems:  CONSTITUTIONAL: +Fatigue. No fever or chills.  EYES: No eye pain, visual disturbances, or discharge.  ENMT: No difficulty hearing, tinnitus, vertigo. No sinus or throat pain.  NECK: No pain or stiffness.  RESPIRATORY: No cough, wheezing, chills or hemoptysis. No shortness of breath.  CARDIOVASCULAR: No chest pain, palpitations, dizziness, or leg swelling.  GASTROINTESTINAL: No abdominal or epigastric pain. No nausea, vomiting, or hematemesis. No diarrhea or constipation. No melena or hematochezia.  GENITOURINARY: No dysuria, frequency, hematuria, or incontinence.  NEUROLOGICAL: No headaches, memory loss, loss of strength, numbness, or tremors.  SKIN: No itching, burning, rashes, or lesions.   MUSCULOSKELETAL: No joint pain or swelling. No muscle, back, or extremity pain.  PSYCHIATRIC: No depression, anxiety, mood swings, or difficulty sleeping.      PAST MEDICAL & SURGICAL HISTORY-  HTN (hypertension)      Hemorrhoids      Fecal impaction      MVP (mitral valve prolapse)      Dizziness      No significant past surgical history      No significant past surgical history          Medications:    atenolol  Tablet 25 milliGRAM(s) Oral daily PRN  losartan 50 milliGRAM(s) Oral daily    mirtazapine 7.5 milliGRAM(s) Oral daily  OLANZapine Disintegrating Tablet 5 milliGRAM(s) Oral at bedtime    senna 2 Tablet(s) Oral at bedtime    sodium chloride 0.9%. 1000 milliLiter(s) IV Continuous <Continuous>    chlorhexidine 4% Liquid 1 Application(s) Topical <User Schedule>        ICU Vital Signs Last 24 Hrs  T(C): 36.7 (08 Jan 2024 14:51), Max: 36.7 (08 Jan 2024 14:51)  T(F): 98.1 (08 Jan 2024 14:51), Max: 98.1 (08 Jan 2024 14:51)  HR: 59 (08 Jan 2024 14:51) (59 - 59)  BP: 202/78 (08 Jan 2024 14:51) (202/78 - 202/78)  BP(mean): --  ABP: --  ABP(mean): --  RR: 20 (08 Jan 2024 14:51) (20 - 20)  SpO2: 97% (08 Jan 2024 14:51) (97% - 97%)    O2 Parameters below as of 08 Jan 2024 14:51  Patient On (Oxygen Delivery Method): room air      I&O's Detail      LABS:                        12.8   6.93  )-----------( 171      ( 08 Jan 2024 13:56 )             40.0     138  |  103  |  26<H>  ----------------------------<  126<H>  3.7   |  31  |  1.02    Ca    9.8      08 Jan 2024 13:56    TPro  7.5  /  Alb  3.5  /  TBili  0.5  /  DBili  x   /  AST  44<H>  /  ALT  45  /  AlkPhos  91  01-08      CAPILLARY BLOOD GLUCOSE      PT/INR - ( 08 Jan 2024 13:56 )   PT: 10.4 sec;   INR: 0.92 ratio    PTT - ( 08 Jan 2024 13:56 )  PTT:28.2 sec      Urinalysis Basic - ( 08 Jan 2024 13:56 )  Color: x / Appearance: x / SG: x / pH: x  Gluc: 126 mg/dL / Ketone: x  / Bili: x / Urobili: x   Blood: x / Protein: x / Nitrite: x   Leuk Esterase: x / RBC: x / WBC x   Sq Epi: x / Non Sq Epi: x / Bacteria: x      CULTURES:      Physical Examination:  General: Elderly female, well developed No acute distress.    HEENT: Pupils equal, reactive to light.  Symmetric.  PULM: Clear to auscultation bilaterally, no significant sputum production  NECK: Supple, no lymphadenopathy, trachea midline  CVS: Regular rate and rhythm, no murmurs, rubs, or gallops  ABD: Soft, nondistended, nontender, normoactive bowel sounds, no masses  EXT: No edema, nontender  SKIN: Warm and well perfused, no rashes noted.  NEURO: Alert, oriented, interactive, nonfocal  DEVICES:       RADIOLOGY-    < from: CT Cervical Spine No Cont (01.08.24 @ 15:55) >  ACC: [id number] EXAM:  CT CERVICAL SPINE   ORDERED BY: DELIO GUADALUPE     PROCEDURE DATE:  01/08/2024      INTERPRETATION:  Clinical indication: Trauma.    Multiple axial sections were performed through the cervical spine.   Coronal and sagittalreconstructions were performed    This exam is compared prior cervical spine CT performed on November 12, 2022.    Loss of the normal cervical lordosis is identified    Demineralization of the bones are seen.    C4 is slightly anteriorly displaced relative to C5 and C7 is slightly   increased coastal to T1. These findings likely the result of chronic   degenerative changes    Disc space narrowing and plate changes and osteophytes are seen involving   the cervical spine region. Vacuum disc change is seen involving the C4-5   C5-6 and C6-7 levels. These findings are secondary to chronic   degenerative changes.    Bilateral hypertrophic facet joint changes are seen involving multiple   levels secondary to chronic degenerative changes.    Thereare no fractures or dislocations identified    Lucencies are seen the right clivus is again identified as well as the   anterior aspect of C1 right side. These lucencies appear stable when   compared prior exam and could be compatible degenerative change. MRI can   be done to better characterize these findings if clinically indicated and   if there are no contraindications.    No acute fractures or dislocations are seen    Evaluation of the paraspinal soft tissues is limited by lack of IV   contrast though grossly unremarkable.    The visualized portions of both lung apices appear clear.    IMPRESSION: Degenerative changes involving the cervical spine is again   seen and increased when compared with compared with the prior exam.    --- End of Report ---    JAMEEL ARAGON MD; Attending Radiologist  This document has been electronically signed. Jan 8 2024  4:27PM    < end of copied text >        < from: CT Chest No Cont (01.08.24 @ 15:55) >  ACC: [id number] EXAM:  CT ABDOMEN AND PELVIS   ORDERED BY: DELIO GUADALUPE     ACC: [id number] EXAM:  CT CHEST   ORDERED BY: DELIO GUADALUPE     PROCEDURE DATE:  01/08/2024      INTERPRETATION:  CLINICAL INFORMATION: Trauma    COMPARISON: 10/17/2021 and 8/1/2021    CONTRAST/COMPLICATIONS:  IV Contrast: NONE  Oral Contrast: NONE  Complications: None reported at time of study completion    PROCEDURE:  CT of the Chest, Abdomen and Pelvis was performed.  Sagittal and coronal reformats were performed.    FINDINGS:    Please note that evaluation of the chest/abdominal organs and vascular   structures is limited by lack of intravenous contrast.    CHEST:  LUNGS AND LARGE AIRWAYS: Patent central airways. Bilateral dependent and   basilar atelectasis. Stable 3-4 mm left lower lobe nodule. No lung   consolidations.  PLEURA: No pleural effusion or pneumothorax.  VESSELS: Atherosclerotic calcifications of thoracic aorta and coronary   arteries.  HEART: Heart size is normal. Aortic valve and mitral annulus   calcifications. No pericardial effusion.  MEDIASTINUM AND VERN: No lymphadenopathy.  CHEST WALL AND LOWER NECK: Within normal limits.    ABDOMEN AND PELVIS:  LIVER: Subcentimeter hypodensity, too small to further characterize.  BILE DUCTS: Normal caliber.  GALLBLADDER: Within normal limits.  SPLEEN: Within normal limits.  PANCREAS: Stable ductal prominence. 5 mm pancreatic head hypodense lesion   appears stable.  ADRENALS: Nonspecific left adrenal thickening.  KIDNEYS/URETERS: Bilateral nonobstructive renal calculi. No   hydronephrosis.    BLADDER: Contains layering calculi.  REPRODUCTIVE ORGANS: Uterus and adnexa within normal limits.    BOWEL: No bowel obstruction. Appendix is not visualized. No evidence of   inflammation in the pericecal region.  PERITONEUM: No ascites.  VESSELS: Within normal limits.  RETROPERITONEUM/LYMPH NODES: No lymphadenopathy.  ABDOMINAL WALL: Within normal limits.  BONES: Degenerative changes. Sternal fixation hardware. Stable T3 and T4   wedge deformity.    IMPRESSION:  No findings suspicious for acute intrathoracic or intra-abdominal   visceral injury.      --- End of Report --    EVA JENNINGS MD; Attending Radiologist  This document has been electronically signed. Jan 8 2024  4:51PM    < end of copied text >        < from: CT Head No Cont (01.08.24 @ 14:57) >  ACC: [id number] EXAM:  CT BRAIN   ORDERED BY: SILVIANO ALVAREZ     PROCEDURE DATE:  01/08/2024      INTERPRETATION:  Clinical indication: Fall.    Multiple axial sections were performed from the base of skull to vertex   without contrast enhancement. Coronal and sagittal reconstructions were   performed    This exam is dated prior head CT performed on November 12, 2022    Parenchymal volume loss and chronic microvascular ischemic changes are   again seen    Acute subarachnoid hemorrhage is seen involving the high left frontal   region.    No significant shift or herniation is seen.    Evaluation of the osseous appropriate window appears normal    The visualized paraspinal sinuses mastoid and middle ear regions appear   clear.    Impression: Subarachnoid hemorrhage as described above.    Findings discussed with Dr Pederson on January 2024 3:06 PM  with read back.    --- End of Report ---      JAMEEL ARAGON MD; Attending Radiologist  This document has been electronically signed. Jan 8 2024  3:06PM    < end of copied text >        Time spent on this patient encounter, which includes documenting this note in the electronic medical record, was 75+ minutes including assessing the presenting problems with associated risks, reviewing the medical record to prepare for the encounter, and meeting face to face with the patient to obtain additional history.  I have also performed an appropriate physical exam, made interventions listed and ordered and interpreted appropriate diagnostic studies as documented.     To improve communication and patient safety, I have coordinated care with the multidisciplinary team including the bedside nurse, appropriate attending of record and consultants as needed.        DATE OF ENTRY OF THIS NOTE IS EQUAL TO THE DATE OF SERVICES RENDERED Patient is a 88y old  Female who presents with a chief complaint of     BRIEF HOSPITAL COURSE-      Events last 24 hours:       Review of Systems:  CONSTITUTIONAL: +Fatigue. No fever or chills.  EYES: No eye pain, visual disturbances, or discharge.  ENMT: No difficulty hearing, tinnitus, vertigo. No sinus or throat pain.  NECK: No pain or stiffness.  RESPIRATORY: No cough, wheezing, chills or hemoptysis. No shortness of breath.  CARDIOVASCULAR: No chest pain, palpitations, dizziness, or leg swelling.  GASTROINTESTINAL: No abdominal or epigastric pain. No nausea, vomiting, or hematemesis. No diarrhea or constipation. No melena or hematochezia.  GENITOURINARY: No dysuria, frequency, hematuria, or incontinence.  NEUROLOGICAL: No headaches, memory loss, loss of strength, numbness, or tremors.  SKIN: No itching, burning, rashes, or lesions.   MUSCULOSKELETAL: No joint pain or swelling. No muscle, back, or extremity pain.  PSYCHIATRIC: No depression, anxiety, mood swings, or difficulty sleeping.      PAST MEDICAL & SURGICAL HISTORY-  HTN (hypertension)      Hemorrhoids      Fecal impaction      MVP (mitral valve prolapse)      Dizziness      No significant past surgical history      No significant past surgical history          Medications:    atenolol  Tablet 25 milliGRAM(s) Oral daily PRN  losartan 50 milliGRAM(s) Oral daily    mirtazapine 7.5 milliGRAM(s) Oral daily  OLANZapine Disintegrating Tablet 5 milliGRAM(s) Oral at bedtime    senna 2 Tablet(s) Oral at bedtime    sodium chloride 0.9%. 1000 milliLiter(s) IV Continuous <Continuous>    chlorhexidine 4% Liquid 1 Application(s) Topical <User Schedule>        ICU Vital Signs Last 24 Hrs  T(C): 36.7 (08 Jan 2024 14:51), Max: 36.7 (08 Jan 2024 14:51)  T(F): 98.1 (08 Jan 2024 14:51), Max: 98.1 (08 Jan 2024 14:51)  HR: 59 (08 Jan 2024 14:51) (59 - 59)  BP: 202/78 (08 Jan 2024 14:51) (202/78 - 202/78)  BP(mean): --  ABP: --  ABP(mean): --  RR: 20 (08 Jan 2024 14:51) (20 - 20)  SpO2: 97% (08 Jan 2024 14:51) (97% - 97%)    O2 Parameters below as of 08 Jan 2024 14:51  Patient On (Oxygen Delivery Method): room air      I&O's Detail      LABS:                        12.8   6.93  )-----------( 171      ( 08 Jan 2024 13:56 )             40.0     138  |  103  |  26<H>  ----------------------------<  126<H>  3.7   |  31  |  1.02    Ca    9.8      08 Jan 2024 13:56    TPro  7.5  /  Alb  3.5  /  TBili  0.5  /  DBili  x   /  AST  44<H>  /  ALT  45  /  AlkPhos  91  01-08      CAPILLARY BLOOD GLUCOSE      PT/INR - ( 08 Jan 2024 13:56 )   PT: 10.4 sec;   INR: 0.92 ratio    PTT - ( 08 Jan 2024 13:56 )  PTT:28.2 sec      Urinalysis Basic - ( 08 Jan 2024 13:56 )  Color: x / Appearance: x / SG: x / pH: x  Gluc: 126 mg/dL / Ketone: x  / Bili: x / Urobili: x   Blood: x / Protein: x / Nitrite: x   Leuk Esterase: x / RBC: x / WBC x   Sq Epi: x / Non Sq Epi: x / Bacteria: x      CULTURES:      Physical Examination:  General: Elderly female, well developed No acute distress.    HEENT: Pupils equal, reactive to light.  Symmetric.  PULM: Clear to auscultation bilaterally, no significant sputum production  NECK: Supple, no lymphadenopathy, trachea midline  CVS: Regular rate and rhythm, no murmurs, rubs, or gallops  ABD: Soft, nondistended, nontender, normoactive bowel sounds, no masses  EXT: No edema, nontender  SKIN: Warm and well perfused, no rashes noted.  NEURO: Alert, oriented, interactive, nonfocal  DEVICES:       RADIOLOGY-    < from: CT Cervical Spine No Cont (01.08.24 @ 15:55) >  ACC: [id number] EXAM:  CT CERVICAL SPINE   ORDERED BY: DELIO GUADALUPE     PROCEDURE DATE:  01/08/2024      INTERPRETATION:  Clinical indication: Trauma.    Multiple axial sections were performed through the cervical spine.   Coronal and sagittalreconstructions were performed    This exam is compared prior cervical spine CT performed on November 12, 2022.    Loss of the normal cervical lordosis is identified    Demineralization of the bones are seen.    C4 is slightly anteriorly displaced relative to C5 and C7 is slightly   increased coastal to T1. These findings likely the result of chronic   degenerative changes    Disc space narrowing and plate changes and osteophytes are seen involving   the cervical spine region. Vacuum disc change is seen involving the C4-5   C5-6 and C6-7 levels. These findings are secondary to chronic   degenerative changes.    Bilateral hypertrophic facet joint changes are seen involving multiple   levels secondary to chronic degenerative changes.    Thereare no fractures or dislocations identified    Lucencies are seen the right clivus is again identified as well as the   anterior aspect of C1 right side. These lucencies appear stable when   compared prior exam and could be compatible degenerative change. MRI can   be done to better characterize these findings if clinically indicated and   if there are no contraindications.    No acute fractures or dislocations are seen    Evaluation of the paraspinal soft tissues is limited by lack of IV   contrast though grossly unremarkable.    The visualized portions of both lung apices appear clear.    IMPRESSION: Degenerative changes involving the cervical spine is again   seen and increased when compared with compared with the prior exam.    --- End of Report ---    JAMEEL ARAGON MD; Attending Radiologist  This document has been electronically signed. Jan 8 2024  4:27PM    < end of copied text >        < from: CT Chest No Cont (01.08.24 @ 15:55) >  ACC: [id number] EXAM:  CT ABDOMEN AND PELVIS   ORDERED BY: DELIO GUADALUPE     ACC: [id number] EXAM:  CT CHEST   ORDERED BY: DELIO GUADALUPE     PROCEDURE DATE:  01/08/2024      INTERPRETATION:  CLINICAL INFORMATION: Trauma    COMPARISON: 10/17/2021 and 8/1/2021    CONTRAST/COMPLICATIONS:  IV Contrast: NONE  Oral Contrast: NONE  Complications: None reported at time of study completion    PROCEDURE:  CT of the Chest, Abdomen and Pelvis was performed.  Sagittal and coronal reformats were performed.    FINDINGS:    Please note that evaluation of the chest/abdominal organs and vascular   structures is limited by lack of intravenous contrast.    CHEST:  LUNGS AND LARGE AIRWAYS: Patent central airways. Bilateral dependent and   basilar atelectasis. Stable 3-4 mm left lower lobe nodule. No lung   consolidations.  PLEURA: No pleural effusion or pneumothorax.  VESSELS: Atherosclerotic calcifications of thoracic aorta and coronary   arteries.  HEART: Heart size is normal. Aortic valve and mitral annulus   calcifications. No pericardial effusion.  MEDIASTINUM AND VERN: No lymphadenopathy.  CHEST WALL AND LOWER NECK: Within normal limits.    ABDOMEN AND PELVIS:  LIVER: Subcentimeter hypodensity, too small to further characterize.  BILE DUCTS: Normal caliber.  GALLBLADDER: Within normal limits.  SPLEEN: Within normal limits.  PANCREAS: Stable ductal prominence. 5 mm pancreatic head hypodense lesion   appears stable.  ADRENALS: Nonspecific left adrenal thickening.  KIDNEYS/URETERS: Bilateral nonobstructive renal calculi. No   hydronephrosis.    BLADDER: Contains layering calculi.  REPRODUCTIVE ORGANS: Uterus and adnexa within normal limits.    BOWEL: No bowel obstruction. Appendix is not visualized. No evidence of   inflammation in the pericecal region.  PERITONEUM: No ascites.  VESSELS: Within normal limits.  RETROPERITONEUM/LYMPH NODES: No lymphadenopathy.  ABDOMINAL WALL: Within normal limits.  BONES: Degenerative changes. Sternal fixation hardware. Stable T3 and T4   wedge deformity.    IMPRESSION:  No findings suspicious for acute intrathoracic or intra-abdominal   visceral injury.      --- End of Report --    EVA JENNINGS MD; Attending Radiologist  This document has been electronically signed. Jan 8 2024  4:51PM    < end of copied text >        < from: CT Head No Cont (01.08.24 @ 14:57) >  ACC: [id number] EXAM:  CT BRAIN   ORDERED BY: SILVIANO ALVAREZ     PROCEDURE DATE:  01/08/2024      INTERPRETATION:  Clinical indication: Fall.    Multiple axial sections were performed from the base of skull to vertex   without contrast enhancement. Coronal and sagittal reconstructions were   performed    This exam is dated prior head CT performed on November 12, 2022    Parenchymal volume loss and chronic microvascular ischemic changes are   again seen    Acute subarachnoid hemorrhage is seen involving the high left frontal   region.    No significant shift or herniation is seen.    Evaluation of the osseous appropriate window appears normal    The visualized paraspinal sinuses mastoid and middle ear regions appear   clear.    Impression: Subarachnoid hemorrhage as described above.    Findings discussed with Dr Pederson on January 2024 3:06 PM  with read back.    --- End of Report ---      JAMEEL ARAGON MD; Attending Radiologist  This document has been electronically signed. Jan 8 2024  3:06PM    < end of copied text >        Time spent on this patient encounter, which includes documenting this note in the electronic medical record, was 75+ minutes including assessing the presenting problems with associated risks, reviewing the medical record to prepare for the encounter, and meeting face to face with the patient to obtain additional history.  I have also performed an appropriate physical exam, made interventions listed and ordered and interpreted appropriate diagnostic studies as documented.     To improve communication and patient safety, I have coordinated care with the multidisciplinary team including the bedside nurse, appropriate attending of record and consultants as needed.        DATE OF ENTRY OF THIS NOTE IS EQUAL TO THE DATE OF SERVICES RENDERED Patient is a 88y old  Female who presents with a chief complaint of     BRIEF HOSPITAL COURSE-  89 y/o Female with PMH of HTN presents to  ED s/p fall. Collateral obtained from [facility]. \[facility] [place] [facility]\"" patient was in normal state of health. This afternoon, patient with sudden onset dizziness. Reports she brought the patient to go lay down.  was in the bathroom when she heard a thud. Found the patient on the ground. Denies LOC, +laceration. Code Trauma called on arrival to ED.     On presentation to the ED, labs significant for.       Review of Systems:  CONSTITUTIONAL: +Fatigue. No fever or chills.  EYES: No eye pain, visual disturbances, or discharge.  ENMT: No difficulty hearing, tinnitus, vertigo. No sinus or throat pain.  NECK: No pain or stiffness.  RESPIRATORY: No cough, wheezing, chills or hemoptysis. No shortness of breath.  CARDIOVASCULAR: No chest pain, palpitations, dizziness, or leg swelling.  GASTROINTESTINAL: No abdominal or epigastric pain. No nausea, vomiting, or hematemesis. No diarrhea or constipation. No melena or hematochezia.  GENITOURINARY: No dysuria, frequency, hematuria, or incontinence.  NEUROLOGICAL: No headaches, memory loss, loss of strength, numbness, or tremors.  SKIN: No itching, burning, rashes, or lesions.   MUSCULOSKELETAL: No joint pain or swelling. No muscle, back, or extremity pain.  PSYCHIATRIC: No depression, anxiety, mood swings, or difficulty sleeping.      PAST MEDICAL & SURGICAL HISTORY-  HTN (hypertension)      Hemorrhoids      Fecal impaction      MVP (mitral valve prolapse)      Dizziness      No significant past surgical history      No significant past surgical history          Medications:    atenolol  Tablet 25 milliGRAM(s) Oral daily PRN  losartan 50 milliGRAM(s) Oral daily    mirtazapine 7.5 milliGRAM(s) Oral daily  OLANZapine Disintegrating Tablet 5 milliGRAM(s) Oral at bedtime    senna 2 Tablet(s) Oral at bedtime    sodium chloride 0.9%. 1000 milliLiter(s) IV Continuous <Continuous>    chlorhexidine 4% Liquid 1 Application(s) Topical <User Schedule>        ICU Vital Signs Last 24 Hrs  T(C): 36.7 (08 Jan 2024 14:51), Max: 36.7 (08 Jan 2024 14:51)  T(F): 98.1 (08 Jan 2024 14:51), Max: 98.1 (08 Jan 2024 14:51)  HR: 59 (08 Jan 2024 14:51) (59 - 59)  BP: 202/78 (08 Jan 2024 14:51) (202/78 - 202/78)  BP(mean): --  ABP: --  ABP(mean): --  RR: 20 (08 Jan 2024 14:51) (20 - 20)  SpO2: 97% (08 Jan 2024 14:51) (97% - 97%)    O2 Parameters below as of 08 Jan 2024 14:51  Patient On (Oxygen Delivery Method): room air      I&O's Detail      LABS:                        12.8   6.93  )-----------( 171      ( 08 Jan 2024 13:56 )             40.0     138  |  103  |  26<H>  ----------------------------<  126<H>  3.7   |  31  |  1.02    Ca    9.8      08 Jan 2024 13:56    TPro  7.5  /  Alb  3.5  /  TBili  0.5  /  DBili  x   /  AST  44<H>  /  ALT  45  /  AlkPhos  91  01-08      CAPILLARY BLOOD GLUCOSE      PT/INR - ( 08 Jan 2024 13:56 )   PT: 10.4 sec;   INR: 0.92 ratio    PTT - ( 08 Jan 2024 13:56 )  PTT:28.2 sec      Urinalysis Basic - ( 08 Jan 2024 13:56 )  Color: x / Appearance: x / SG: x / pH: x  Gluc: 126 mg/dL / Ketone: x  / Bili: x / Urobili: x   Blood: x / Protein: x / Nitrite: x   Leuk Esterase: x / RBC: x / WBC x   Sq Epi: x / Non Sq Epi: x / Bacteria: x      CULTURES:      Physical Examination:  General: Elderly female, well developed. In no acute distress.    HEENT: Pupils equal, reactive to light. Symmetric.  PULM: Clear to auscultation bilaterally, no adventitious sounds. No significant sputum production.  NECK: Supple, no lymphadenopathy, trachea midline.  CVS: Regular rate and rhythm. No murmurs, rubs, or gallops. S1, S2 intact.   ABD: Soft, nondistended, nontender, normoactive bowel sounds. No masses palpable.   EXT: No edema, nontender.  SKIN: Warm and well perfused, no rashes noted.  NEURO: Alert, oriented, interactive. Nonfocal.  DEVICES:       RADIOLOGY-    < from: CT Cervical Spine No Cont (01.08.24 @ 15:55) >  ACC: [id number] EXAM:  CT CERVICAL SPINE   ORDERED BY: DELIO GUADALUPE     PROCEDURE DATE:  01/08/2024      INTERPRETATION:  Clinical indication: Trauma.    Multiple axial sections were performed through the cervical spine.   Coronal and sagittalreconstructions were performed    This exam is compared prior cervical spine CT performed on November 12, 2022.    Loss of the normal cervical lordosis is identified    Demineralization of the bones are seen.    C4 is slightly anteriorly displaced relative to C5 and C7 is slightly   increased coastal to T1. These findings likely the result of chronic   degenerative changes    Disc space narrowing and plate changes and osteophytes are seen involving   the cervical spine region. Vacuum disc change is seen involving the C4-5   C5-6 and C6-7 levels. These findings are secondary to chronic   degenerative changes.    Bilateral hypertrophic facet joint changes are seen involving multiple   levels secondary to chronic degenerative changes.    Thereare no fractures or dislocations identified    Lucencies are seen the right clivus is again identified as well as the   anterior aspect of C1 right side. These lucencies appear stable when   compared prior exam and could be compatible degenerative change. MRI can   be done to better characterize these findings if clinically indicated and   if there are no contraindications.    No acute fractures or dislocations are seen    Evaluation of the paraspinal soft tissues is limited by lack of IV   contrast though grossly unremarkable.    The visualized portions of both lung apices appear clear.    IMPRESSION: Degenerative changes involving the cervical spine is again   seen and increased when compared with compared with the prior exam.    --- End of Report ---    JAMEEL ARAGON MD; Attending Radiologist  This document has been electronically signed. Jan 8 2024  4:27PM    < end of copied text >        < from: CT Chest No Cont (01.08.24 @ 15:55) >  ACC: [id number] EXAM:  CT ABDOMEN AND PELVIS   ORDERED BY: DELIO GUADALUPE     ACC: [id number] EXAM:  CT CHEST   ORDERED BY: DELIO GUADALUPE     PROCEDURE DATE:  01/08/2024      INTERPRETATION:  CLINICAL INFORMATION: Trauma    COMPARISON: 10/17/2021 and 8/1/2021    CONTRAST/COMPLICATIONS:  IV Contrast: NONE  Oral Contrast: NONE  Complications: None reported at time of study completion    PROCEDURE:  CT of the Chest, Abdomen and Pelvis was performed.  Sagittal and coronal reformats were performed.    FINDINGS:    Please note that evaluation of the chest/abdominal organs and vascular   structures is limited by lack of intravenous contrast.    CHEST:  LUNGS AND LARGE AIRWAYS: Patent central airways. Bilateral dependent and   basilar atelectasis. Stable 3-4 mm left lower lobe nodule. No lung   consolidations.  PLEURA: No pleural effusion or pneumothorax.  VESSELS: Atherosclerotic calcifications of thoracic aorta and coronary   arteries.  HEART: Heart size is normal. Aortic valve and mitral annulus   calcifications. No pericardial effusion.  MEDIASTINUM AND VERN: No lymphadenopathy.  CHEST WALL AND LOWER NECK: Within normal limits.    ABDOMEN AND PELVIS:  LIVER: Subcentimeter hypodensity, too small to further characterize.  BILE DUCTS: Normal caliber.  GALLBLADDER: Within normal limits.  SPLEEN: Within normal limits.  PANCREAS: Stable ductal prominence. 5 mm pancreatic head hypodense lesion   appears stable.  ADRENALS: Nonspecific left adrenal thickening.  KIDNEYS/URETERS: Bilateral nonobstructive renal calculi. No   hydronephrosis.    BLADDER: Contains layering calculi.  REPRODUCTIVE ORGANS: Uterus and adnexa within normal limits.    BOWEL: No bowel obstruction. Appendix is not visualized. No evidence of   inflammation in the pericecal region.  PERITONEUM: No ascites.  VESSELS: Within normal limits.  RETROPERITONEUM/LYMPH NODES: No lymphadenopathy.  ABDOMINAL WALL: Within normal limits.  BONES: Degenerative changes. Sternal fixation hardware. Stable T3 and T4   wedge deformity.    IMPRESSION:  No findings suspicious for acute intrathoracic or intra-abdominal   visceral injury.      --- End of Report --    EVA JENNINGS MD; Attending Radiologist  This document has been electronically signed. Jan 8 2024  4:51PM    < end of copied text >        < from: CT Head No Cont (01.08.24 @ 14:57) >  ACC: [id number] EXAM:  CT BRAIN   ORDERED BY: SILVIANO ALVAREZ     PROCEDURE DATE:  01/08/2024      INTERPRETATION:  Clinical indication: Fall.    Multiple axial sections were performed from the base of skull to vertex   without contrast enhancement. Coronal and sagittal reconstructions were   performed    This exam is dated prior head CT performed on November 12, 2022    Parenchymal volume loss and chronic microvascular ischemic changes are   again seen    Acute subarachnoid hemorrhage is seen involving the high left frontal   region.    No significant shift or herniation is seen.    Evaluation of the osseous appropriate window appears normal    The visualized paraspinal sinuses mastoid and middle ear regions appear   clear.    Impression: Subarachnoid hemorrhage as described above.    Findings discussed with Dr Pederson on January 2024 3:06 PM  with read back.    --- End of Report ---      JAMEEL ARAGON MD; Attending Radiologist  This document has been electronically signed. Jan 8 2024  3:06PM    < end of copied text >        Time spent on this patient encounter, which includes documenting this note in the electronic medical record, was 75+ minutes including assessing the presenting problems with associated risks, reviewing the medical record to prepare for the encounter, and meeting face to face with the patient to obtain additional history.  I have also performed an appropriate physical exam, made interventions listed and ordered and interpreted appropriate diagnostic studies as documented.     To improve communication and patient safety, I have coordinated care with the multidisciplinary team including the bedside nurse, appropriate attending of record and consultants as needed.        DATE OF ENTRY OF THIS NOTE IS EQUAL TO THE DATE OF SERVICES RENDERED Patient is a 88y old  Female who presents with a chief complaint of     BRIEF HOSPITAL COURSE-  89 y/o Female with PMH of HTN presents to  ED s/p fall. Collateral obtained from [facility]. [facility] patient was in normal state of health. This afternoon, patient with sudden onset dizziness. Reports she brought the patient to go lay down.  was in the bathroom when she heard a thud. Found the patient on the ground. Denies LOC, +laceration. Code Trauma called on arrival to ED.     On presentation to the ED, labs significant for.       Review of Systems:  CONSTITUTIONAL: +Fatigue. No fever or chills.  EYES: No eye pain, visual disturbances, or discharge.  ENMT: No difficulty hearing, tinnitus, vertigo. No sinus or throat pain.  NECK: No pain or stiffness.  RESPIRATORY: No cough, wheezing, chills or hemoptysis. No shortness of breath.  CARDIOVASCULAR: No chest pain, palpitations, dizziness, or leg swelling.  GASTROINTESTINAL: No abdominal or epigastric pain. No nausea, vomiting, or hematemesis. No diarrhea or constipation. No melena or hematochezia.  GENITOURINARY: No dysuria, frequency, hematuria, or incontinence.  NEUROLOGICAL: No headaches, memory loss, loss of strength, numbness, or tremors.  SKIN: No itching, burning, rashes, or lesions.   MUSCULOSKELETAL: No joint pain or swelling. No muscle, back, or extremity pain.  PSYCHIATRIC: No depression, anxiety, mood swings, or difficulty sleeping.      PAST MEDICAL & SURGICAL HISTORY-  HTN (hypertension)      Hemorrhoids      Fecal impaction      MVP (mitral valve prolapse)      Dizziness      No significant past surgical history      No significant past surgical history          Medications:    atenolol  Tablet 25 milliGRAM(s) Oral daily PRN  losartan 50 milliGRAM(s) Oral daily    mirtazapine 7.5 milliGRAM(s) Oral daily  OLANZapine Disintegrating Tablet 5 milliGRAM(s) Oral at bedtime    senna 2 Tablet(s) Oral at bedtime    sodium chloride 0.9%. 1000 milliLiter(s) IV Continuous <Continuous>    chlorhexidine 4% Liquid 1 Application(s) Topical <User Schedule>        ICU Vital Signs Last 24 Hrs  T(C): 36.7 (08 Jan 2024 14:51), Max: 36.7 (08 Jan 2024 14:51)  T(F): 98.1 (08 Jan 2024 14:51), Max: 98.1 (08 Jan 2024 14:51)  HR: 59 (08 Jan 2024 14:51) (59 - 59)  BP: 202/78 (08 Jan 2024 14:51) (202/78 - 202/78)  BP(mean): --  ABP: --  ABP(mean): --  RR: 20 (08 Jan 2024 14:51) (20 - 20)  SpO2: 97% (08 Jan 2024 14:51) (97% - 97%)    O2 Parameters below as of 08 Jan 2024 14:51  Patient On (Oxygen Delivery Method): room air      I&O's Detail      LABS:                        12.8   6.93  )-----------( 171      ( 08 Jan 2024 13:56 )             40.0     138  |  103  |  26<H>  ----------------------------<  126<H>  3.7   |  31  |  1.02    Ca    9.8      08 Jan 2024 13:56    TPro  7.5  /  Alb  3.5  /  TBili  0.5  /  DBili  x   /  AST  44<H>  /  ALT  45  /  AlkPhos  91  01-08      CAPILLARY BLOOD GLUCOSE      PT/INR - ( 08 Jan 2024 13:56 )   PT: 10.4 sec;   INR: 0.92 ratio    PTT - ( 08 Jan 2024 13:56 )  PTT:28.2 sec      Urinalysis Basic - ( 08 Jan 2024 13:56 )  Color: x / Appearance: x / SG: x / pH: x  Gluc: 126 mg/dL / Ketone: x  / Bili: x / Urobili: x   Blood: x / Protein: x / Nitrite: x   Leuk Esterase: x / RBC: x / WBC x   Sq Epi: x / Non Sq Epi: x / Bacteria: x      CULTURES:      Physical Examination:  General: Elderly female, well developed. In no acute distress.    HEENT: Pupils equal, reactive to light. Symmetric.  PULM: Clear to auscultation bilaterally, no adventitious sounds. No significant sputum production.  NECK: Supple, no lymphadenopathy, trachea midline.  CVS: Regular rate and rhythm. No murmurs, rubs, or gallops. S1, S2 intact.   ABD: Soft, nondistended, nontender, normoactive bowel sounds. No masses palpable.   EXT: No edema, nontender.  SKIN: Warm and well perfused, no rashes noted.  NEURO: Alert, oriented, interactive. Nonfocal.  DEVICES:       RADIOLOGY-    < from: CT Cervical Spine No Cont (01.08.24 @ 15:55) >  ACC: [id number] EXAM:  CT CERVICAL SPINE   ORDERED BY: DELIO GUADALUPE     PROCEDURE DATE:  01/08/2024      INTERPRETATION:  Clinical indication: Trauma.    Multiple axial sections were performed through the cervical spine.   Coronal and sagittalreconstructions were performed    This exam is compared prior cervical spine CT performed on November 12, 2022.    Loss of the normal cervical lordosis is identified    Demineralization of the bones are seen.    C4 is slightly anteriorly displaced relative to C5 and C7 is slightly   increased coastal to T1. These findings likely the result of chronic   degenerative changes    Disc space narrowing and plate changes and osteophytes are seen involving   the cervical spine region. Vacuum disc change is seen involving the C4-5   C5-6 and C6-7 levels. These findings are secondary to chronic   degenerative changes.    Bilateral hypertrophic facet joint changes are seen involving multiple   levels secondary to chronic degenerative changes.    Thereare no fractures or dislocations identified    Lucencies are seen the right clivus is again identified as well as the   anterior aspect of C1 right side. These lucencies appear stable when   compared prior exam and could be compatible degenerative change. MRI can   be done to better characterize these findings if clinically indicated and   if there are no contraindications.    No acute fractures or dislocations are seen    Evaluation of the paraspinal soft tissues is limited by lack of IV   contrast though grossly unremarkable.    The visualized portions of both lung apices appear clear.    IMPRESSION: Degenerative changes involving the cervical spine is again   seen and increased when compared with compared with the prior exam.    --- End of Report ---    JAMEEL ARAGON MD; Attending Radiologist  This document has been electronically signed. Jan 8 2024  4:27PM    < end of copied text >        < from: CT Chest No Cont (01.08.24 @ 15:55) >  ACC: [id number] EXAM:  CT ABDOMEN AND PELVIS   ORDERED BY: DELIO GUADALUPE     ACC: [id number] EXAM:  CT CHEST   ORDERED BY: DELIO GUADALUPE     PROCEDURE DATE:  01/08/2024      INTERPRETATION:  CLINICAL INFORMATION: Trauma    COMPARISON: 10/17/2021 and 8/1/2021    CONTRAST/COMPLICATIONS:  IV Contrast: NONE  Oral Contrast: NONE  Complications: None reported at time of study completion    PROCEDURE:  CT of the Chest, Abdomen and Pelvis was performed.  Sagittal and coronal reformats were performed.    FINDINGS:    Please note that evaluation of the chest/abdominal organs and vascular   structures is limited by lack of intravenous contrast.    CHEST:  LUNGS AND LARGE AIRWAYS: Patent central airways. Bilateral dependent and   basilar atelectasis. Stable 3-4 mm left lower lobe nodule. No lung   consolidations.  PLEURA: No pleural effusion or pneumothorax.  VESSELS: Atherosclerotic calcifications of thoracic aorta and coronary   arteries.  HEART: Heart size is normal. Aortic valve and mitral annulus   calcifications. No pericardial effusion.  MEDIASTINUM AND VERN: No lymphadenopathy.  CHEST WALL AND LOWER NECK: Within normal limits.    ABDOMEN AND PELVIS:  LIVER: Subcentimeter hypodensity, too small to further characterize.  BILE DUCTS: Normal caliber.  GALLBLADDER: Within normal limits.  SPLEEN: Within normal limits.  PANCREAS: Stable ductal prominence. 5 mm pancreatic head hypodense lesion   appears stable.  ADRENALS: Nonspecific left adrenal thickening.  KIDNEYS/URETERS: Bilateral nonobstructive renal calculi. No   hydronephrosis.    BLADDER: Contains layering calculi.  REPRODUCTIVE ORGANS: Uterus and adnexa within normal limits.    BOWEL: No bowel obstruction. Appendix is not visualized. No evidence of   inflammation in the pericecal region.  PERITONEUM: No ascites.  VESSELS: Within normal limits.  RETROPERITONEUM/LYMPH NODES: No lymphadenopathy.  ABDOMINAL WALL: Within normal limits.  BONES: Degenerative changes. Sternal fixation hardware. Stable T3 and T4   wedge deformity.    IMPRESSION:  No findings suspicious for acute intrathoracic or intra-abdominal   visceral injury.      --- End of Report --    EVA JENNINGS MD; Attending Radiologist  This document has been electronically signed. Jan 8 2024  4:51PM    < end of copied text >        < from: CT Head No Cont (01.08.24 @ 14:57) >  ACC: [id number] EXAM:  CT BRAIN   ORDERED BY: SILVIANO ALVAREZ     PROCEDURE DATE:  01/08/2024      INTERPRETATION:  Clinical indication: Fall.    Multiple axial sections were performed from the base of skull to vertex   without contrast enhancement. Coronal and sagittal reconstructions were   performed    This exam is dated prior head CT performed on November 12, 2022    Parenchymal volume loss and chronic microvascular ischemic changes are   again seen    Acute subarachnoid hemorrhage is seen involving the high left frontal   region.    No significant shift or herniation is seen.    Evaluation of the osseous appropriate window appears normal    The visualized paraspinal sinuses mastoid and middle ear regions appear   clear.    Impression: Subarachnoid hemorrhage as described above.    Findings discussed with Dr Pederson on January 2024 3:06 PM  with read back.    --- End of Report ---      JAMEEL ARAGON MD; Attending Radiologist  This document has been electronically signed. Jan 8 2024  3:06PM    < end of copied text >        Time spent on this patient encounter, which includes documenting this note in the electronic medical record, was 75+ minutes including assessing the presenting problems with associated risks, reviewing the medical record to prepare for the encounter, and meeting face to face with the patient to obtain additional history.  I have also performed an appropriate physical exam, made interventions listed and ordered and interpreted appropriate diagnostic studies as documented.     To improve communication and patient safety, I have coordinated care with the multidisciplinary team including the bedside nurse, appropriate attending of record and consultants as needed.        DATE OF ENTRY OF THIS NOTE IS EQUAL TO THE DATE OF SERVICES RENDERED Patient is a 88y old  Female who presents with a chief complaint of     BRIEF HOSPITAL COURSE-  89 y/o Female with PMH of HTN presents to  ED s/p fall. Collateral obtained from [facility]. [facility] patient was in normal state of health. This afternoon, patient with sudden onset dizziness. Reports she brought the patient to go lay down.  was in the bathroom when she heard a thud. Found the patient on the ground. Denies LOC, +laceration. Code Trauma called on arrival to ED.     On presentation to the ED, labs significant for CATRINA (BUN/Cr 1.02).       Review of Systems:  CONSTITUTIONAL: +Fatigue. No fever or chills.  EYES: No eye pain, visual disturbances, or discharge.  ENMT: No difficulty hearing, tinnitus, vertigo. No sinus or throat pain.  NECK: No pain or stiffness.  RESPIRATORY: No cough, wheezing, chills or hemoptysis. No shortness of breath.  CARDIOVASCULAR: No chest pain, palpitations, dizziness, or leg swelling.  GASTROINTESTINAL: No abdominal or epigastric pain. No nausea, vomiting, or hematemesis. No diarrhea or constipation. No melena or hematochezia.  GENITOURINARY: No dysuria, frequency, hematuria, or incontinence.  NEUROLOGICAL: No headaches, memory loss, loss of strength, numbness, or tremors.  SKIN: No itching, burning, rashes, or lesions.   MUSCULOSKELETAL: No joint pain or swelling. No muscle, back, or extremity pain.  PSYCHIATRIC: No depression, anxiety, mood swings, or difficulty sleeping.      PAST MEDICAL & SURGICAL HISTORY-  HTN (hypertension)      Hemorrhoids      Fecal impaction      MVP (mitral valve prolapse)      Dizziness      No significant past surgical history      No significant past surgical history          Medications:    atenolol  Tablet 25 milliGRAM(s) Oral daily PRN  losartan 50 milliGRAM(s) Oral daily    mirtazapine 7.5 milliGRAM(s) Oral daily  OLANZapine Disintegrating Tablet 5 milliGRAM(s) Oral at bedtime    senna 2 Tablet(s) Oral at bedtime    sodium chloride 0.9%. 1000 milliLiter(s) IV Continuous <Continuous>    chlorhexidine 4% Liquid 1 Application(s) Topical <User Schedule>        ICU Vital Signs Last 24 Hrs  T(C): 36.7 (08 Jan 2024 14:51), Max: 36.7 (08 Jan 2024 14:51)  T(F): 98.1 (08 Jan 2024 14:51), Max: 98.1 (08 Jan 2024 14:51)  HR: 59 (08 Jan 2024 14:51) (59 - 59)  BP: 202/78 (08 Jan 2024 14:51) (202/78 - 202/78)  BP(mean): --  ABP: --  ABP(mean): --  RR: 20 (08 Jan 2024 14:51) (20 - 20)  SpO2: 97% (08 Jan 2024 14:51) (97% - 97%)    O2 Parameters below as of 08 Jan 2024 14:51  Patient On (Oxygen Delivery Method): room air      I&O's Detail      LABS:                        12.8   6.93  )-----------( 171      ( 08 Jan 2024 13:56 )             40.0     138  |  103  |  26<H>  ----------------------------<  126<H>  3.7   |  31  |  1.02    Ca    9.8      08 Jan 2024 13:56    TPro  7.5  /  Alb  3.5  /  TBili  0.5  /  DBili  x   /  AST  44<H>  /  ALT  45  /  AlkPhos  91  01-08      CAPILLARY BLOOD GLUCOSE      PT/INR - ( 08 Jan 2024 13:56 )   PT: 10.4 sec;   INR: 0.92 ratio    PTT - ( 08 Jan 2024 13:56 )  PTT:28.2 sec      Urinalysis Basic - ( 08 Jan 2024 13:56 )  Color: x / Appearance: x / SG: x / pH: x  Gluc: 126 mg/dL / Ketone: x  / Bili: x / Urobili: x   Blood: x / Protein: x / Nitrite: x   Leuk Esterase: x / RBC: x / WBC x   Sq Epi: x / Non Sq Epi: x / Bacteria: x      CULTURES:      Physical Examination:  General: Elderly female, well developed. In no acute distress.    HEENT: Pupils equal, reactive to light. Symmetric.  PULM: Clear to auscultation bilaterally, no adventitious sounds. No significant sputum production.  NECK: Supple, no lymphadenopathy, trachea midline.  CVS: Regular rate and rhythm. No murmurs, rubs, or gallops. S1, S2 intact.   ABD: Soft, nondistended, nontender, normoactive bowel sounds. No masses palpable.   EXT: No edema, nontender.  SKIN: Warm and well perfused, no rashes noted.  NEURO: Alert, oriented, interactive. Nonfocal.  DEVICES:       RADIOLOGY-    < from: CT Cervical Spine No Cont (01.08.24 @ 15:55) >  ACC: [id number] EXAM:  CT CERVICAL SPINE   ORDERED BY: DELIO GUADALUPE     PROCEDURE DATE:  01/08/2024      INTERPRETATION:  Clinical indication: Trauma.    Multiple axial sections were performed through the cervical spine.   Coronal and sagittalreconstructions were performed    This exam is compared prior cervical spine CT performed on November 12, 2022.    Loss of the normal cervical lordosis is identified    Demineralization of the bones are seen.    C4 is slightly anteriorly displaced relative to C5 and C7 is slightly   increased coastal to T1. These findings likely the result of chronic   degenerative changes    Disc space narrowing and plate changes and osteophytes are seen involving   the cervical spine region. Vacuum disc change is seen involving the C4-5   C5-6 and C6-7 levels. These findings are secondary to chronic   degenerative changes.    Bilateral hypertrophic facet joint changes are seen involving multiple   levels secondary to chronic degenerative changes.    Thereare no fractures or dislocations identified    Lucencies are seen the right clivus is again identified as well as the   anterior aspect of C1 right side. These lucencies appear stable when   compared prior exam and could be compatible degenerative change. MRI can   be done to better characterize these findings if clinically indicated and   if there are no contraindications.    No acute fractures or dislocations are seen    Evaluation of the paraspinal soft tissues is limited by lack of IV   contrast though grossly unremarkable.    The visualized portions of both lung apices appear clear.    IMPRESSION: Degenerative changes involving the cervical spine is again   seen and increased when compared with compared with the prior exam.    --- End of Report ---    JAMEEL ARAGON MD; Attending Radiologist  This document has been electronically signed. Jan 8 2024  4:27PM    < end of copied text >        < from: CT Chest No Cont (01.08.24 @ 15:55) >  ACC: [id number] EXAM:  CT ABDOMEN AND PELVIS   ORDERED BY: DELIO GUADALUPE     ACC: [id number] EXAM:  CT CHEST   ORDERED BY: DELIO GUADALUPE     PROCEDURE DATE:  01/08/2024      INTERPRETATION:  CLINICAL INFORMATION: Trauma    COMPARISON: 10/17/2021 and 8/1/2021    CONTRAST/COMPLICATIONS:  IV Contrast: NONE  Oral Contrast: NONE  Complications: None reported at time of study completion    PROCEDURE:  CT of the Chest, Abdomen and Pelvis was performed.  Sagittal and coronal reformats were performed.    FINDINGS:    Please note that evaluation of the chest/abdominal organs and vascular   structures is limited by lack of intravenous contrast.    CHEST:  LUNGS AND LARGE AIRWAYS: Patent central airways. Bilateral dependent and   basilar atelectasis. Stable 3-4 mm left lower lobe nodule. No lung   consolidations.  PLEURA: No pleural effusion or pneumothorax.  VESSELS: Atherosclerotic calcifications of thoracic aorta and coronary   arteries.  HEART: Heart size is normal. Aortic valve and mitral annulus   calcifications. No pericardial effusion.  MEDIASTINUM AND VERN: No lymphadenopathy.  CHEST WALL AND LOWER NECK: Within normal limits.    ABDOMEN AND PELVIS:  LIVER: Subcentimeter hypodensity, too small to further characterize.  BILE DUCTS: Normal caliber.  GALLBLADDER: Within normal limits.  SPLEEN: Within normal limits.  PANCREAS: Stable ductal prominence. 5 mm pancreatic head hypodense lesion   appears stable.  ADRENALS: Nonspecific left adrenal thickening.  KIDNEYS/URETERS: Bilateral nonobstructive renal calculi. No   hydronephrosis.    BLADDER: Contains layering calculi.  REPRODUCTIVE ORGANS: Uterus and adnexa within normal limits.    BOWEL: No bowel obstruction. Appendix is not visualized. No evidence of   inflammation in the pericecal region.  PERITONEUM: No ascites.  VESSELS: Within normal limits.  RETROPERITONEUM/LYMPH NODES: No lymphadenopathy.  ABDOMINAL WALL: Within normal limits.  BONES: Degenerative changes. Sternal fixation hardware. Stable T3 and T4   wedge deformity.    IMPRESSION:  No findings suspicious for acute intrathoracic or intra-abdominal   visceral injury.      --- End of Report --    EVA JENNINGS MD; Attending Radiologist  This document has been electronically signed. Jan 8 2024  4:51PM    < end of copied text >        < from: CT Head No Cont (01.08.24 @ 14:57) >  ACC: [id number] EXAM:  CT BRAIN   ORDERED BY: SILVIANO ALVAREZ     PROCEDURE DATE:  01/08/2024      INTERPRETATION:  Clinical indication: Fall.    Multiple axial sections were performed from the base of skull to vertex   without contrast enhancement. Coronal and sagittal reconstructions were   performed    This exam is dated prior head CT performed on November 12, 2022    Parenchymal volume loss and chronic microvascular ischemic changes are   again seen    Acute subarachnoid hemorrhage is seen involving the high left frontal   region.    No significant shift or herniation is seen.    Evaluation of the osseous appropriate window appears normal    The visualized paraspinal sinuses mastoid and middle ear regions appear   clear.    Impression: Subarachnoid hemorrhage as described above.    Findings discussed with Dr Pederson on January 2024 3:06 PM  with read back.    --- End of Report ---      JAMEEL ARAGON MD; Attending Radiologist  This document has been electronically signed. Jan 8 2024  3:06PM    < end of copied text >        Time spent on this patient encounter, which includes documenting this note in the electronic medical record, was 75+ minutes including assessing the presenting problems with associated risks, reviewing the medical record to prepare for the encounter, and meeting face to face with the patient to obtain additional history.  I have also performed an appropriate physical exam, made interventions listed and ordered and interpreted appropriate diagnostic studies as documented.     To improve communication and patient safety, I have coordinated care with the multidisciplinary team including the bedside nurse, appropriate attending of record and consultants as needed.        DATE OF ENTRY OF THIS NOTE IS EQUAL TO THE DATE OF SERVICES RENDERED Patient is a 88y old  Female who presents with a chief complaint of     BRIEF HOSPITAL COURSE-  87 y/o Female with PMH of HTN presents to  ED s/p fall. Collateral obtained from [place]. [facility] patient was in normal state of health. This afternoon, patient with sudden onset dizziness. Reports she brought the patient to go lay down.  was in the bathroom when she heard a thud. Found the patient on the ground. Denies LOC, +laceration. Code Trauma called on arrival to ED.     On presentation to the ED, labs significant for CATRINA (BUN/Cr 1.02).       Review of Systems:  CONSTITUTIONAL: +Fatigue. No fever or chills.  EYES: No eye pain, visual disturbances, or discharge.  ENMT: No difficulty hearing, tinnitus, vertigo. No sinus or throat pain.  NECK: No pain or stiffness.  RESPIRATORY: No cough, wheezing, chills or hemoptysis. No shortness of breath.  CARDIOVASCULAR: No chest pain, palpitations, dizziness, or leg swelling.  GASTROINTESTINAL: No abdominal or epigastric pain. No nausea, vomiting, or hematemesis. No diarrhea or constipation. No melena or hematochezia.  GENITOURINARY: No dysuria, frequency, hematuria, or incontinence.  NEUROLOGICAL: No headaches, memory loss, loss of strength, numbness, or tremors.  SKIN: No itching, burning, rashes, or lesions.   MUSCULOSKELETAL: No joint pain or swelling. No muscle, back, or extremity pain.  PSYCHIATRIC: No depression, anxiety, mood swings, or difficulty sleeping.      PAST MEDICAL & SURGICAL HISTORY-  HTN (hypertension)      Hemorrhoids      Fecal impaction      MVP (mitral valve prolapse)      Dizziness      No significant past surgical history      No significant past surgical history          Medications:    atenolol  Tablet 25 milliGRAM(s) Oral daily PRN  losartan 50 milliGRAM(s) Oral daily    mirtazapine 7.5 milliGRAM(s) Oral daily  OLANZapine Disintegrating Tablet 5 milliGRAM(s) Oral at bedtime    senna 2 Tablet(s) Oral at bedtime    sodium chloride 0.9%. 1000 milliLiter(s) IV Continuous <Continuous>    chlorhexidine 4% Liquid 1 Application(s) Topical <User Schedule>        ICU Vital Signs Last 24 Hrs  T(C): 36.7 (08 Jan 2024 14:51), Max: 36.7 (08 Jan 2024 14:51)  T(F): 98.1 (08 Jan 2024 14:51), Max: 98.1 (08 Jan 2024 14:51)  HR: 59 (08 Jan 2024 14:51) (59 - 59)  BP: 202/78 (08 Jan 2024 14:51) (202/78 - 202/78)  BP(mean): --  ABP: --  ABP(mean): --  RR: 20 (08 Jan 2024 14:51) (20 - 20)  SpO2: 97% (08 Jan 2024 14:51) (97% - 97%)    O2 Parameters below as of 08 Jan 2024 14:51  Patient On (Oxygen Delivery Method): room air      I&O's Detail      LABS:                        12.8   6.93  )-----------( 171      ( 08 Jan 2024 13:56 )             40.0     138  |  103  |  26<H>  ----------------------------<  126<H>  3.7   |  31  |  1.02    Ca    9.8      08 Jan 2024 13:56    TPro  7.5  /  Alb  3.5  /  TBili  0.5  /  DBili  x   /  AST  44<H>  /  ALT  45  /  AlkPhos  91  01-08      CAPILLARY BLOOD GLUCOSE      PT/INR - ( 08 Jan 2024 13:56 )   PT: 10.4 sec;   INR: 0.92 ratio    PTT - ( 08 Jan 2024 13:56 )  PTT:28.2 sec      Urinalysis Basic - ( 08 Jan 2024 13:56 )  Color: x / Appearance: x / SG: x / pH: x  Gluc: 126 mg/dL / Ketone: x  / Bili: x / Urobili: x   Blood: x / Protein: x / Nitrite: x   Leuk Esterase: x / RBC: x / WBC x   Sq Epi: x / Non Sq Epi: x / Bacteria: x      CULTURES:      Physical Examination:  General: Elderly female, well developed. In no acute distress.    HEENT: Pupils equal, reactive to light. Symmetric.  PULM: Clear to auscultation bilaterally, no adventitious sounds. No significant sputum production.  NECK: Supple, no lymphadenopathy, trachea midline.  CVS: Regular rate and rhythm. No murmurs, rubs, or gallops. S1, S2 intact.   ABD: Soft, nondistended, nontender, normoactive bowel sounds. No masses palpable.   EXT: No edema, nontender.  SKIN: Warm and well perfused, no rashes noted.  NEURO: Alert, oriented, interactive. Nonfocal.  DEVICES:       RADIOLOGY-    < from: CT Cervical Spine No Cont (01.08.24 @ 15:55) >  ACC: [id number] EXAM:  CT CERVICAL SPINE   ORDERED BY: DELIO GUADALUPE     PROCEDURE DATE:  01/08/2024      INTERPRETATION:  Clinical indication: Trauma.    Multiple axial sections were performed through the cervical spine.   Coronal and sagittalreconstructions were performed    This exam is compared prior cervical spine CT performed on November 12, 2022.    Loss of the normal cervical lordosis is identified    Demineralization of the bones are seen.    C4 is slightly anteriorly displaced relative to C5 and C7 is slightly   increased coastal to T1. These findings likely the result of chronic   degenerative changes    Disc space narrowing and plate changes and osteophytes are seen involving   the cervical spine region. Vacuum disc change is seen involving the C4-5   C5-6 and C6-7 levels. These findings are secondary to chronic   degenerative changes.    Bilateral hypertrophic facet joint changes are seen involving multiple   levels secondary to chronic degenerative changes.    Thereare no fractures or dislocations identified    Lucencies are seen the right clivus is again identified as well as the   anterior aspect of C1 right side. These lucencies appear stable when   compared prior exam and could be compatible degenerative change. MRI can   be done to better characterize these findings if clinically indicated and   if there are no contraindications.    No acute fractures or dislocations are seen    Evaluation of the paraspinal soft tissues is limited by lack of IV   contrast though grossly unremarkable.    The visualized portions of both lung apices appear clear.    IMPRESSION: Degenerative changes involving the cervical spine is again   seen and increased when compared with compared with the prior exam.    --- End of Report ---    JAMEEL ARAGON MD; Attending Radiologist  This document has been electronically signed. Jan 8 2024  4:27PM    < end of copied text >        < from: CT Chest No Cont (01.08.24 @ 15:55) >  ACC: [id number] EXAM:  CT ABDOMEN AND PELVIS   ORDERED BY: DELIO GUADALUPE     ACC: [id number] EXAM:  CT CHEST   ORDERED BY: DELIO GUADALUPE     PROCEDURE DATE:  01/08/2024      INTERPRETATION:  CLINICAL INFORMATION: Trauma    COMPARISON: 10/17/2021 and 8/1/2021    CONTRAST/COMPLICATIONS:  IV Contrast: NONE  Oral Contrast: NONE  Complications: None reported at time of study completion    PROCEDURE:  CT of the Chest, Abdomen and Pelvis was performed.  Sagittal and coronal reformats were performed.    FINDINGS:    Please note that evaluation of the chest/abdominal organs and vascular   structures is limited by lack of intravenous contrast.    CHEST:  LUNGS AND LARGE AIRWAYS: Patent central airways. Bilateral dependent and   basilar atelectasis. Stable 3-4 mm left lower lobe nodule. No lung   consolidations.  PLEURA: No pleural effusion or pneumothorax.  VESSELS: Atherosclerotic calcifications of thoracic aorta and coronary   arteries.  HEART: Heart size is normal. Aortic valve and mitral annulus   calcifications. No pericardial effusion.  MEDIASTINUM AND VERN: No lymphadenopathy.  CHEST WALL AND LOWER NECK: Within normal limits.    ABDOMEN AND PELVIS:  LIVER: Subcentimeter hypodensity, too small to further characterize.  BILE DUCTS: Normal caliber.  GALLBLADDER: Within normal limits.  SPLEEN: Within normal limits.  PANCREAS: Stable ductal prominence. 5 mm pancreatic head hypodense lesion   appears stable.  ADRENALS: Nonspecific left adrenal thickening.  KIDNEYS/URETERS: Bilateral nonobstructive renal calculi. No   hydronephrosis.    BLADDER: Contains layering calculi.  REPRODUCTIVE ORGANS: Uterus and adnexa within normal limits.    BOWEL: No bowel obstruction. Appendix is not visualized. No evidence of   inflammation in the pericecal region.  PERITONEUM: No ascites.  VESSELS: Within normal limits.  RETROPERITONEUM/LYMPH NODES: No lymphadenopathy.  ABDOMINAL WALL: Within normal limits.  BONES: Degenerative changes. Sternal fixation hardware. Stable T3 and T4   wedge deformity.    IMPRESSION:  No findings suspicious for acute intrathoracic or intra-abdominal   visceral injury.      --- End of Report --    EVA JENNINGS MD; Attending Radiologist  This document has been electronically signed. Jan 8 2024  4:51PM    < end of copied text >        < from: CT Head No Cont (01.08.24 @ 14:57) >  ACC: [id number] EXAM:  CT BRAIN   ORDERED BY: SILVIANO ALVAREZ     PROCEDURE DATE:  01/08/2024      INTERPRETATION:  Clinical indication: Fall.    Multiple axial sections were performed from the base of skull to vertex   without contrast enhancement. Coronal and sagittal reconstructions were   performed    This exam is dated prior head CT performed on November 12, 2022    Parenchymal volume loss and chronic microvascular ischemic changes are   again seen    Acute subarachnoid hemorrhage is seen involving the high left frontal   region.    No significant shift or herniation is seen.    Evaluation of the osseous appropriate window appears normal    The visualized paraspinal sinuses mastoid and middle ear regions appear   clear.    Impression: Subarachnoid hemorrhage as described above.    Findings discussed with Dr Pederson on January 2024 3:06 PM  with read back.    --- End of Report ---      JAMEEL ARAGON MD; Attending Radiologist  This document has been electronically signed. Jan 8 2024  3:06PM    < end of copied text >        Time spent on this patient encounter, which includes documenting this note in the electronic medical record, was 75+ minutes including assessing the presenting problems with associated risks, reviewing the medical record to prepare for the encounter, and meeting face to face with the patient to obtain additional history.  I have also performed an appropriate physical exam, made interventions listed and ordered and interpreted appropriate diagnostic studies as documented.     To improve communication and patient safety, I have coordinated care with the multidisciplinary team including the bedside nurse, appropriate attending of record and consultants as needed.        DATE OF ENTRY OF THIS NOTE IS EQUAL TO THE DATE OF SERVICES RENDERED Patient is a 88y old  Female who presents with a chief complaint of     BRIEF HOSPITAL COURSE-  89 y/o Female with PMH of HTN presents to  ED s/p fall. Collateral obtained from [facility]. [facility] [place] patient was in normal state of health. This afternoon, patient with sudden onset dizziness. Reports she brought the patient to go lay down.  was in the bathroom when she heard a thud. Found the patient on the ground. Denies LOC, +laceration. Code Trauma called on arrival to ED.     On presentation to the ED, labs significant for CATRINA (BUN/Cr 1.02).       Review of Systems:  CONSTITUTIONAL: +Fatigue. No fever or chills.  EYES: No eye pain, visual disturbances, or discharge.  ENMT: No difficulty hearing, tinnitus, vertigo. No sinus or throat pain.  NECK: No pain or stiffness.  RESPIRATORY: No cough, wheezing, chills or hemoptysis. No shortness of breath.  CARDIOVASCULAR: No chest pain, palpitations, dizziness, or leg swelling.  GASTROINTESTINAL: No abdominal or epigastric pain. No nausea, vomiting, or hematemesis. No diarrhea or constipation. No melena or hematochezia.  GENITOURINARY: No dysuria, frequency, hematuria, or incontinence.  NEUROLOGICAL: No headaches, memory loss, loss of strength, numbness, or tremors.  SKIN: No itching, burning, rashes, or lesions.   MUSCULOSKELETAL: No joint pain or swelling. No muscle, back, or extremity pain.  PSYCHIATRIC: No depression, anxiety, mood swings, or difficulty sleeping.      PAST MEDICAL & SURGICAL HISTORY-  HTN (hypertension)      Hemorrhoids      Fecal impaction      MVP (mitral valve prolapse)      Dizziness      No significant past surgical history      No significant past surgical history          Medications:    atenolol  Tablet 25 milliGRAM(s) Oral daily PRN  losartan 50 milliGRAM(s) Oral daily    mirtazapine 7.5 milliGRAM(s) Oral daily  OLANZapine Disintegrating Tablet 5 milliGRAM(s) Oral at bedtime    senna 2 Tablet(s) Oral at bedtime    sodium chloride 0.9%. 1000 milliLiter(s) IV Continuous <Continuous>    chlorhexidine 4% Liquid 1 Application(s) Topical <User Schedule>        ICU Vital Signs Last 24 Hrs  T(C): 36.7 (08 Jan 2024 14:51), Max: 36.7 (08 Jan 2024 14:51)  T(F): 98.1 (08 Jan 2024 14:51), Max: 98.1 (08 Jan 2024 14:51)  HR: 59 (08 Jan 2024 14:51) (59 - 59)  BP: 202/78 (08 Jan 2024 14:51) (202/78 - 202/78)  BP(mean): --  ABP: --  ABP(mean): --  RR: 20 (08 Jan 2024 14:51) (20 - 20)  SpO2: 97% (08 Jan 2024 14:51) (97% - 97%)    O2 Parameters below as of 08 Jan 2024 14:51  Patient On (Oxygen Delivery Method): room air      I&O's Detail      LABS:                        12.8   6.93  )-----------( 171      ( 08 Jan 2024 13:56 )             40.0     138  |  103  |  26<H>  ----------------------------<  126<H>  3.7   |  31  |  1.02    Ca    9.8      08 Jan 2024 13:56    TPro  7.5  /  Alb  3.5  /  TBili  0.5  /  DBili  x   /  AST  44<H>  /  ALT  45  /  AlkPhos  91  01-08      CAPILLARY BLOOD GLUCOSE      PT/INR - ( 08 Jan 2024 13:56 )   PT: 10.4 sec;   INR: 0.92 ratio    PTT - ( 08 Jan 2024 13:56 )  PTT:28.2 sec      Urinalysis Basic - ( 08 Jan 2024 13:56 )  Color: x / Appearance: x / SG: x / pH: x  Gluc: 126 mg/dL / Ketone: x  / Bili: x / Urobili: x   Blood: x / Protein: x / Nitrite: x   Leuk Esterase: x / RBC: x / WBC x   Sq Epi: x / Non Sq Epi: x / Bacteria: x      CULTURES:      Physical Examination:  General: Elderly female, well developed. In no acute distress.    HEENT: Pupils equal, reactive to light. Symmetric.  PULM: Clear to auscultation bilaterally, no adventitious sounds. No significant sputum production.  NECK: Supple, no lymphadenopathy, trachea midline.  CVS: Regular rate and rhythm. No murmurs, rubs, or gallops. S1, S2 intact.   ABD: Soft, nondistended, nontender, normoactive bowel sounds. No masses palpable.   EXT: No edema, nontender.  SKIN: Warm and well perfused, no rashes noted.  NEURO: Alert, oriented, interactive. Speech fluent. No aphasia. Strength intact 5/5 in B/L UE and LE.   DEVICES:       RADIOLOGY-    < from: CT Cervical Spine No Cont (01.08.24 @ 15:55) >  ACC: [id number] EXAM:  CT CERVICAL SPINE   ORDERED BY: DELIO GUADALUPE     PROCEDURE DATE:  01/08/2024      INTERPRETATION:  Clinical indication: Trauma.    Multiple axial sections were performed through the cervical spine.   Coronal and sagittalreconstructions were performed    This exam is compared prior cervical spine CT performed on November 12, 2022.    Loss of the normal cervical lordosis is identified    Demineralization of the bones are seen.    C4 is slightly anteriorly displaced relative to C5 and C7 is slightly   increased coastal to T1. These findings likely the result of chronic   degenerative changes    Disc space narrowing and plate changes and osteophytes are seen involving   the cervical spine region. Vacuum disc change is seen involving the C4-5   C5-6 and C6-7 levels. These findings are secondary to chronic   degenerative changes.    Bilateral hypertrophic facet joint changes are seen involving multiple   levels secondary to chronic degenerative changes.    Thereare no fractures or dislocations identified    Lucencies are seen the right clivus is again identified as well as the   anterior aspect of C1 right side. These lucencies appear stable when   compared prior exam and could be compatible degenerative change. MRI can   be done to better characterize these findings if clinically indicated and   if there are no contraindications.    No acute fractures or dislocations are seen    Evaluation of the paraspinal soft tissues is limited by lack of IV   contrast though grossly unremarkable.    The visualized portions of both lung apices appear clear.    IMPRESSION: Degenerative changes involving the cervical spine is again   seen and increased when compared with compared with the prior exam.    --- End of Report ---    JAMEEL ARAGON MD; Attending Radiologist  This document has been electronically signed. Jan 8 2024  4:27PM    < end of copied text >        < from: CT Chest No Cont (01.08.24 @ 15:55) >  ACC: [id number] EXAM:  CT ABDOMEN AND PELVIS   ORDERED BY: DELIO GUADALUPE     ACC: [id number] EXAM:  CT CHEST   ORDERED BY: DELIO GUADALUPE     PROCEDURE DATE:  01/08/2024      INTERPRETATION:  CLINICAL INFORMATION: Trauma    COMPARISON: 10/17/2021 and 8/1/2021    CONTRAST/COMPLICATIONS:  IV Contrast: NONE  Oral Contrast: NONE  Complications: None reported at time of study completion    PROCEDURE:  CT of the Chest, Abdomen and Pelvis was performed.  Sagittal and coronal reformats were performed.    FINDINGS:    Please note that evaluation of the chest/abdominal organs and vascular   structures is limited by lack of intravenous contrast.    CHEST:  LUNGS AND LARGE AIRWAYS: Patent central airways. Bilateral dependent and   basilar atelectasis. Stable 3-4 mm left lower lobe nodule. No lung   consolidations.  PLEURA: No pleural effusion or pneumothorax.  VESSELS: Atherosclerotic calcifications of thoracic aorta and coronary   arteries.  HEART: Heart size is normal. Aortic valve and mitral annulus   calcifications. No pericardial effusion.  MEDIASTINUM AND VERN: No lymphadenopathy.  CHEST WALL AND LOWER NECK: Within normal limits.    ABDOMEN AND PELVIS:  LIVER: Subcentimeter hypodensity, too small to further characterize.  BILE DUCTS: Normal caliber.  GALLBLADDER: Within normal limits.  SPLEEN: Within normal limits.  PANCREAS: Stable ductal prominence. 5 mm pancreatic head hypodense lesion   appears stable.  ADRENALS: Nonspecific left adrenal thickening.  KIDNEYS/URETERS: Bilateral nonobstructive renal calculi. No   hydronephrosis.    BLADDER: Contains layering calculi.  REPRODUCTIVE ORGANS: Uterus and adnexa within normal limits.    BOWEL: No bowel obstruction. Appendix is not visualized. No evidence of   inflammation in the pericecal region.  PERITONEUM: No ascites.  VESSELS: Within normal limits.  RETROPERITONEUM/LYMPH NODES: No lymphadenopathy.  ABDOMINAL WALL: Within normal limits.  BONES: Degenerative changes. Sternal fixation hardware. Stable T3 and T4   wedge deformity.    IMPRESSION:  No findings suspicious for acute intrathoracic or intra-abdominal   visceral injury.      --- End of Report --    EVA JENNINGS MD; Attending Radiologist  This document has been electronically signed. Jan 8 2024  4:51PM    < end of copied text >        < from: CT Head No Cont (01.08.24 @ 14:57) >  ACC: [id number] EXAM:  CT BRAIN   ORDERED BY: SILVIANO ALVAREZ     PROCEDURE DATE:  01/08/2024      INTERPRETATION:  Clinical indication: Fall.    Multiple axial sections were performed from the base of skull to vertex   without contrast enhancement. Coronal and sagittal reconstructions were   performed    This exam is dated prior head CT performed on November 12, 2022    Parenchymal volume loss and chronic microvascular ischemic changes are   again seen    Acute subarachnoid hemorrhage is seen involving the high left frontal   region.    No significant shift or herniation is seen.    Evaluation of the osseous appropriate window appears normal    The visualized paraspinal sinuses mastoid and middle ear regions appear   clear.    Impression: Subarachnoid hemorrhage as described above.    Findings discussed with Dr Pederson on January 2024 3:06 PM  with read back.    --- End of Report ---      JAMEEL ARAGON MD; Attending Radiologist  This document has been electronically signed. Jan 8 2024  3:06PM    < end of copied text >        Time spent on this patient encounter, which includes documenting this note in the electronic medical record, was 75+ minutes including assessing the presenting problems with associated risks, reviewing the medical record to prepare for the encounter, and meeting face to face with the patient to obtain additional history.  I have also performed an appropriate physical exam, made interventions listed and ordered and interpreted appropriate diagnostic studies as documented.     To improve communication and patient safety, I have coordinated care with the multidisciplinary team including the bedside nurse, appropriate attending of record and consultants as needed.        DATE OF ENTRY OF THIS NOTE IS EQUAL TO THE DATE OF SERVICES RENDERED Patient is a 88y old  Female who presents with a chief complaint of     BRIEF HOSPITAL COURSE-  87 y/o Female with PMH of HTN presents to  ED s/p fall. Collateral obtained from [facility]. [facility] patient was in normal state of health. This afternoon, patient with sudden onset dizziness. Reports she brought the patient to go lay down.  was in the bathroom when she heard a thud. Found the patient on the ground. Denies LOC, +laceration. Code Trauma called on arrival to ED.     On presentation to the ED, labs significant for CATRINA (BUN/Cr 1.02).       Review of Systems:  CONSTITUTIONAL: +Fatigue. No fever or chills.  EYES: No eye pain, visual disturbances, or discharge.  ENMT: No difficulty hearing, tinnitus, vertigo. No sinus or throat pain.  NECK: No pain or stiffness.  RESPIRATORY: No cough, wheezing, chills or hemoptysis. No shortness of breath.  CARDIOVASCULAR: No chest pain, palpitations, dizziness, or leg swelling.  GASTROINTESTINAL: No abdominal or epigastric pain. No nausea, vomiting, or hematemesis. No diarrhea or constipation. No melena or hematochezia.  GENITOURINARY: No dysuria, frequency, hematuria, or incontinence.  NEUROLOGICAL: No headaches, memory loss, loss of strength, numbness, or tremors.  SKIN: No itching, burning, rashes, or lesions.   MUSCULOSKELETAL: No joint pain or swelling. No muscle, back, or extremity pain.  PSYCHIATRIC: No depression, anxiety, mood swings, or difficulty sleeping.      PAST MEDICAL & SURGICAL HISTORY-  HTN (hypertension)      Hemorrhoids      Fecal impaction      MVP (mitral valve prolapse)      Dizziness      No significant past surgical history      No significant past surgical history          Medications:    atenolol  Tablet 25 milliGRAM(s) Oral daily PRN  losartan 50 milliGRAM(s) Oral daily    mirtazapine 7.5 milliGRAM(s) Oral daily  OLANZapine Disintegrating Tablet 5 milliGRAM(s) Oral at bedtime    senna 2 Tablet(s) Oral at bedtime    sodium chloride 0.9%. 1000 milliLiter(s) IV Continuous <Continuous>    chlorhexidine 4% Liquid 1 Application(s) Topical <User Schedule>        ICU Vital Signs Last 24 Hrs  T(C): 36.7 (08 Jan 2024 14:51), Max: 36.7 (08 Jan 2024 14:51)  T(F): 98.1 (08 Jan 2024 14:51), Max: 98.1 (08 Jan 2024 14:51)  HR: 59 (08 Jan 2024 14:51) (59 - 59)  BP: 202/78 (08 Jan 2024 14:51) (202/78 - 202/78)  BP(mean): --  ABP: --  ABP(mean): --  RR: 20 (08 Jan 2024 14:51) (20 - 20)  SpO2: 97% (08 Jan 2024 14:51) (97% - 97%)    O2 Parameters below as of 08 Jan 2024 14:51  Patient On (Oxygen Delivery Method): room air      I&O's Detail      LABS:                        12.8   6.93  )-----------( 171      ( 08 Jan 2024 13:56 )             40.0     138  |  103  |  26<H>  ----------------------------<  126<H>  3.7   |  31  |  1.02    Ca    9.8      08 Jan 2024 13:56    TPro  7.5  /  Alb  3.5  /  TBili  0.5  /  DBili  x   /  AST  44<H>  /  ALT  45  /  AlkPhos  91  01-08      CAPILLARY BLOOD GLUCOSE      PT/INR - ( 08 Jan 2024 13:56 )   PT: 10.4 sec;   INR: 0.92 ratio    PTT - ( 08 Jan 2024 13:56 )  PTT:28.2 sec      Urinalysis Basic - ( 08 Jan 2024 13:56 )  Color: x / Appearance: x / SG: x / pH: x  Gluc: 126 mg/dL / Ketone: x  / Bili: x / Urobili: x   Blood: x / Protein: x / Nitrite: x   Leuk Esterase: x / RBC: x / WBC x   Sq Epi: x / Non Sq Epi: x / Bacteria: x      CULTURES:      Physical Examination:  General: Elderly female, well developed. In no acute distress.    HEENT: Pupils equal, reactive to light. Symmetric.  PULM: Clear to auscultation bilaterally, no adventitious sounds. No significant sputum production.  NECK: Supple, no lymphadenopathy, trachea midline.  CVS: Regular rate and rhythm. No murmurs, rubs, or gallops. S1, S2 intact.   ABD: Soft, nondistended, nontender, normoactive bowel sounds. No masses palpable.   EXT: No edema, nontender.  SKIN: Warm and well perfused, no rashes noted.  NEURO: Alert, oriented, interactive. Speech fluent. No aphasia. Strength intact 5/5 in B/L UE and LE.   DEVICES:       RADIOLOGY-    < from: CT Cervical Spine No Cont (01.08.24 @ 15:55) >  ACC: [id number] EXAM:  CT CERVICAL SPINE   ORDERED BY: DELIO GUADALUPE     PROCEDURE DATE:  01/08/2024      INTERPRETATION:  Clinical indication: Trauma.    Multiple axial sections were performed through the cervical spine.   Coronal and sagittalreconstructions were performed    This exam is compared prior cervical spine CT performed on November 12, 2022.    Loss of the normal cervical lordosis is identified    Demineralization of the bones are seen.    C4 is slightly anteriorly displaced relative to C5 and C7 is slightly   increased coastal to T1. These findings likely the result of chronic   degenerative changes    Disc space narrowing and plate changes and osteophytes are seen involving   the cervical spine region. Vacuum disc change is seen involving the C4-5   C5-6 and C6-7 levels. These findings are secondary to chronic   degenerative changes.    Bilateral hypertrophic facet joint changes are seen involving multiple   levels secondary to chronic degenerative changes.    Thereare no fractures or dislocations identified    Lucencies are seen the right clivus is again identified as well as the   anterior aspect of C1 right side. These lucencies appear stable when   compared prior exam and could be compatible degenerative change. MRI can   be done to better characterize these findings if clinically indicated and   if there are no contraindications.    No acute fractures or dislocations are seen    Evaluation of the paraspinal soft tissues is limited by lack of IV   contrast though grossly unremarkable.    The visualized portions of both lung apices appear clear.    IMPRESSION: Degenerative changes involving the cervical spine is again   seen and increased when compared with compared with the prior exam.    --- End of Report ---    JAMEEL ARAGON MD; Attending Radiologist  This document has been electronically signed. Jan 8 2024  4:27PM    < end of copied text >        < from: CT Chest No Cont (01.08.24 @ 15:55) >  ACC: [id number] EXAM:  CT ABDOMEN AND PELVIS   ORDERED BY: DELIO GUADALUPE     ACC: [id number] EXAM:  CT CHEST   ORDERED BY: DELIO GUADALUPE     PROCEDURE DATE:  01/08/2024      INTERPRETATION:  CLINICAL INFORMATION: Trauma    COMPARISON: 10/17/2021 and 8/1/2021    CONTRAST/COMPLICATIONS:  IV Contrast: NONE  Oral Contrast: NONE  Complications: None reported at time of study completion    PROCEDURE:  CT of the Chest, Abdomen and Pelvis was performed.  Sagittal and coronal reformats were performed.    FINDINGS:    Please note that evaluation of the chest/abdominal organs and vascular   structures is limited by lack of intravenous contrast.    CHEST:  LUNGS AND LARGE AIRWAYS: Patent central airways. Bilateral dependent and   basilar atelectasis. Stable 3-4 mm left lower lobe nodule. No lung   consolidations.  PLEURA: No pleural effusion or pneumothorax.  VESSELS: Atherosclerotic calcifications of thoracic aorta and coronary   arteries.  HEART: Heart size is normal. Aortic valve and mitral annulus   calcifications. No pericardial effusion.  MEDIASTINUM AND VERN: No lymphadenopathy.  CHEST WALL AND LOWER NECK: Within normal limits.    ABDOMEN AND PELVIS:  LIVER: Subcentimeter hypodensity, too small to further characterize.  BILE DUCTS: Normal caliber.  GALLBLADDER: Within normal limits.  SPLEEN: Within normal limits.  PANCREAS: Stable ductal prominence. 5 mm pancreatic head hypodense lesion   appears stable.  ADRENALS: Nonspecific left adrenal thickening.  KIDNEYS/URETERS: Bilateral nonobstructive renal calculi. No   hydronephrosis.    BLADDER: Contains layering calculi.  REPRODUCTIVE ORGANS: Uterus and adnexa within normal limits.    BOWEL: No bowel obstruction. Appendix is not visualized. No evidence of   inflammation in the pericecal region.  PERITONEUM: No ascites.  VESSELS: Within normal limits.  RETROPERITONEUM/LYMPH NODES: No lymphadenopathy.  ABDOMINAL WALL: Within normal limits.  BONES: Degenerative changes. Sternal fixation hardware. Stable T3 and T4   wedge deformity.    IMPRESSION:  No findings suspicious for acute intrathoracic or intra-abdominal   visceral injury.      --- End of Report --    EVA JENNINGS MD; Attending Radiologist  This document has been electronically signed. Jan 8 2024  4:51PM    < end of copied text >        < from: CT Head No Cont (01.08.24 @ 14:57) >  ACC: [id number] EXAM:  CT BRAIN   ORDERED BY: SILVIANO ALVAREZ     PROCEDURE DATE:  01/08/2024      INTERPRETATION:  Clinical indication: Fall.    Multiple axial sections were performed from the base of skull to vertex   without contrast enhancement. Coronal and sagittal reconstructions were   performed    This exam is dated prior head CT performed on November 12, 2022    Parenchymal volume loss and chronic microvascular ischemic changes are   again seen    Acute subarachnoid hemorrhage is seen involving the high left frontal   region.    No significant shift or herniation is seen.    Evaluation of the osseous appropriate window appears normal    The visualized paraspinal sinuses mastoid and middle ear regions appear   clear.    Impression: Subarachnoid hemorrhage as described above.    Findings discussed with Dr Pederson on January 2024 3:06 PM  with read back.    --- End of Report ---      JAMEEL ARAGON MD; Attending Radiologist  This document has been electronically signed. Jan 8 2024  3:06PM    < end of copied text >        Time spent on this patient encounter, which includes documenting this note in the electronic medical record, was 75+ minutes including assessing the presenting problems with associated risks, reviewing the medical record to prepare for the encounter, and meeting face to face with the patient to obtain additional history.  I have also performed an appropriate physical exam, made interventions listed and ordered and interpreted appropriate diagnostic studies as documented.     To improve communication and patient safety, I have coordinated care with the multidisciplinary team including the bedside nurse, appropriate attending of record and consultants as needed.        DATE OF ENTRY OF THIS NOTE IS EQUAL TO THE DATE OF SERVICES RENDERED

## 2024-01-08 NOTE — H&P ADULT - HISTORY OF PRESENT ILLNESS
88 year old Female with a past medical history of HTN not on any blood thinners or anti-platelet agents who presents after a fall at home. Patient states that she was in the bathroom when she felt unsteady and dizzy and fell onto the ground with no laceration.  At this time, the patient denies any headaches, nausea, vomiting. Trauma surgery consulted for mechanical fall resulting in Left frontal SAH   Patient seen at  1610    88 year old Female with a past medical history of HTN not on any blood thinners or anti-platelet agents who presents after a fall at home. Patient states that she was in the bathroom when she felt unsteady and dizzy and fell onto the ground with no LOC.  care giver heard a thud and immediately got her up. She had multiple falls in the past. At this time, the patient denies any headaches, nausea, vomiting. Trauma surgery consulted for mechanical fall resulting in Left frontal SAH

## 2024-01-08 NOTE — H&P ADULT - ASSESSMENT
88 F w/ left frontal SAH s/p mechanical fall at home not on any AC.    Plan:   Admit to ICU level of care  [id number]  repeat CTH in 4-6 hours  monitor Na levels  obtain full set of labs including coags and T/S  home meds reconciled   hard collar in place until able to clear w/ imaging & clinically   Intensivist consult placed, service aware  CT C/A/P w/ IV contrast   Pain meds PRN  Patient adamantly refusing lac repair at this time, will continue to revisit to washout and bedside repair    Plan discussed with Dr. Márquez 88 F w/ left frontal SAH s/p mechanical fall at home not on any AC.    Plan:   Admit to ICU level of care  [id number]  repeat CTH in 4-6 hours  monitor Na levels  obtain full set of labs including coags and T/S  home meds reconciled   hard collar in place until able to clear w/ imaging & clinically   Intensivist consult placed, service aware  CT C/A/P w/ IV contrast   Pain meds PRN  Patient adamantly refusing lac repair at this time, will continue to revisit to washout and bedside repair    Plan discussed with Dr. Márquez

## 2024-01-08 NOTE — ED PROVIDER NOTE - INTERNATIONAL TRAVEL
Spoke with patient mother who states patient has had 4 very small round BMs in the past week. States patient janet when having BM. Last BM yesterday. Since Tuesday patient has had a fever. Temp goes up to 103, comes down to  with medication.    State No

## 2024-01-08 NOTE — H&P ADULT - NSHPPHYSICALEXAM_GEN_ALL_CORE
Primary Survey:  Airway: Intact  Breathing: Intact  Circulation: Intact  Deformities: GCS 15      Secondary Survey:  Gen: Awake, Alert and in NAD  HEENT: Pupils equal and  reactive bilaterally, No  blood present in bilateral nares, no hemotympanum, 3cm occipital laceration.  Chest: No gross deformity, Equal chest rise and breathe sounds bilaterally, no audible wheeze or stridor  CV: S1S2 present, no audible murmur  Abd: Soft, non distended, No gross sign of trauma, non tender  Pelvis: Stable palpable Femoral artery bilateral  Perineum/Genitalia/Rectal: No traumatic injury  RUE: Full range of motion present, Sensory intact, No gross deformities of the joint, distal pulses palpated  LUE: Full range of motion present, Sensory intact, No gross deformities of the joint, distal pulses palpated  RLE: Full range of motion present, Sensory intact, No gross deformities of the joint, distal pulse palpated  LLE: Full range of motion present, Sensory intact, No gross deformities of the joint, distal pulse palpated Primary Survey:  Airway: Intact  Breathing: Intact  Circulation: Intact  Deformities: GCS 15      Secondary Survey:  Gen: Awake, Alert and in NAD  HEENT: Pupils equal and  reactive bilaterally, No  blood present in bilateral nares, no hemotympanum, 3cm occipital laceration.  Chest: No gross deformity, Equal chest rise and breathe sounds bilaterally, no audible wheeze or stridor  CV: S1S2 present, no audible murmur  Abd: Soft, distended, No gross sign of trauma, non tender  Pelvis: Stable palpable Femoral artery bilateral  Perineum/Genitalia/Rectal: No traumatic injury  RUE: Full range of motion present, Sensory intact, No gross deformities of the joint, distal pulses palpated  LUE: Full range of motion present, Sensory intact, No gross deformities of the joint, distal pulses palpated  RLE: Full range of motion present, Sensory intact, No gross deformities of the joint, distal pulse palpated  LLE: Full range of motion present, Sensory intact, No gross deformities of the joint, distal pulse palpated Primary Survey:  Airway: Intact  Breathing: Intact  Circulation: Intact  Deformities: GCS 15      Secondary Survey:  Gen: Awake, Alert and in NAD  HEENT: Pupils equal and  reactive bilaterally, No  blood present in bilateral nares, no hemotympanum, 2.5cm occipital laceration.trachea midline, neck supple, no otorrhea, b/l nares with no discharge  Chest: No gross deformity, Equal chest rise and breathe sounds bilaterally, no audible wheeze or stridor  CV: S1S2 present, no audible murmur  Abd: Soft, distended, No gross sign of trauma, non tender  Pelvis: Stable palpable Femoral artery bilateral  Perineum/Genitalia/Rectal: No traumatic injury, no blood at meatus  RUE: Full range of motion present, Sensory intact, No gross deformities of the joint, distal pulses palpated  LUE: Full range of motion present, Sensory intact, No gross deformities of the joint, distal pulses palpated  RLE: Full range of motion present, Sensory intact, No gross deformities of the joint, distal pulse palpated  LLE: Full range of motion present, Sensory intact, No gross deformities of the joint, distal pulse palpated

## 2024-01-08 NOTE — ED PROVIDER NOTE - CLINICAL SUMMARY MEDICAL DECISION MAKING FREE TEXT BOX
CT imaging shows left frontal SAH.   NSGY consult, trauma consulted. CT imaging shows left frontal SAH.   NSGY consult, trauma consulted. Clindamycin ordered. Pan CT ordered.   Per trauma will admit to their service to the ICU

## 2024-01-08 NOTE — CHART NOTE - NSCHARTNOTEFT_GEN_A_CORE
Repeat CT stable, will continue to monitor      PROCEDURE DATE:  01/08/2024          INTERPRETATION:  Clinical indication: Follow-up subarachnoid hemorrhage    Multiple axial sections performed from the base of skull to vertex   without contrast. Coronal and sagittal reconstructions were performed    This exam is compared prior head CT performed earlier the same day at   2:55 PM    Parenchymal volume loss and chronic microvascular ischemic changes are   again seen    Subarachnoid hemorrhage involving the high left frontal region is again   seen and unchanged    No significant shift or herniation is seen.    Evaluation of the osseous window appears unremarkable    The visualized paranasal sinuses mastoid and middle ear clear.    IMPRESSION: Stable subarachnoid hemorrhage again seen as described above.    --- End of Report ---

## 2024-01-08 NOTE — H&P ADULT - ATTENDING COMMENTS
Attending A/P:    Chart reviewed, patient examined, agree with above resident evaluation in addition to the following    88 F w/ left frontal SAH s/p mechanical fall at home not on any AC, 2.5cm scalp laceration, pending CT CAP trauma protocol    PLAN:  ICU admission  q1h neurocheck per NSGY, NSGY consult and repeat CT head per neuro  dry CT cAP with no injuries however, will complete CT CAP with IV contrast to r/o acute traumatic injuries as the Cr is normal  GI/DVT prophylaxis with SCD  TDAp and IV abx for scap lac, close scalp lac at bedside  home meds as appropriate  Pain control  PT, IS    Discussed with patient and her care giver contacted patient's son Alexi and plan discussed with him as well    Pt will be monitored for signs of evolution/resolution of pathology and surgical intervention as required and warranted  Pt aware of and agrees with all of the above    85 minuted of time spent on pt examination, review of relevant labs and radiologic studies, assured stabilization of pt, discussion with relevant services/providers for coordination of pt care and services

## 2024-01-08 NOTE — H&P ADULT - NSHPLABSRESULTS_GEN_ALL_CORE
MEDICATIONS  (STANDING):  chlorhexidine 4% Liquid 1 Application(s) Topical <User Schedule>  losartan 50 milliGRAM(s) Oral daily  mirtazapine 7.5 milliGRAM(s) Oral daily  OLANZapine Disintegrating Tablet 5 milliGRAM(s) Oral at bedtime  senna 2 Tablet(s) Oral at bedtime  sodium chloride 0.9%. 1000 milliLiter(s) (80 mL/Hr) IV Continuous <Continuous>    MEDICATIONS  (PRN):  atenolol  Tablet 25 milliGRAM(s) Oral daily PRN BP>150      PAST MEDICAL & SURGICAL HISTORY:  HTN (hypertension)      Hemorrhoids      Fecal impaction      MVP (mitral valve prolapse)      Dizziness      No significant past surgical history      No significant past surgical history          Vital Signs Last 24 Hrs  T(C): 36.8 (08 Jan 2024 18:16), Max: 36.8 (08 Jan 2024 18:16)  T(F): 98.2 (08 Jan 2024 18:16), Max: 98.2 (08 Jan 2024 18:16)  HR: 57 (08 Jan 2024 18:16) (57 - 67)  BP: 187/78 (08 Jan 2024 18:16) (168/72 - 202/78)  BP(mean): 111 (08 Jan 2024 18:16) (104 - 111)  RR: 16 (08 Jan 2024 18:16) (16 - 20)  SpO2: 98% (08 Jan 2024 18:16) (97% - 98%)    Parameters below as of 08 Jan 2024 17:40  Patient On (Oxygen Delivery Method): room air        I&O's Summary                            12.8   6.93  )-----------( 171      ( 08 Jan 2024 13:56 )             40.0     01-08    138  |  103  |  26<H>  ----------------------------<  126<H>  3.7   |  31  |  1.02    Ca    9.8      08 Jan 2024 13:56    TPro  7.5  /  Alb  3.5  /  TBili  0.5  /  DBili  x   /  AST  44<H>  /  ALT  45  /  AlkPhos  91  01-08

## 2024-01-08 NOTE — ED ADULT NURSE NOTE - NSFALLHARMRISKINTERV_ED_ALL_ED
Communicate risk of Fall with Harm to all staff, patient, and family/Provide visual cue: red socks, yellow wristband, yellow gown, etc/Reinforce activity limits and safety measures with patient and family/Bed in lowest position, wheels locked, appropriate side rails in place/Call bell, personal items and telephone in reach/Instruct patient to call for assistance before getting out of bed/chair/stretcher/Non-slip footwear applied when patient is off stretcher/[place] to call system/Physically safe environment - no spills, clutter or unnecessary equipment/Purposeful Proactive Rounding/Room/bathroom lighting operational, light cord in reach Communicate risk of Fall with Harm to all staff, patient, and family/Provide visual cue: red socks, yellow wristband, yellow gown, etc/Reinforce activity limits and safety measures with patient and family/Bed in lowest position, wheels locked, appropriate side rails in place/Call bell, personal items and telephone in reach/Instruct patient to call for assistance before getting out of bed/chair/stretcher/Non-slip footwear applied when patient is off stretcher/[place] to call system/Physically safe environment - no spills, clutter or unnecessary equipment/Purposeful Proactive Rounding/Room/bathroom lighting operational, light cord in reach

## 2024-01-08 NOTE — CONSULT NOTE ADULT - ASSESSMENT
88 year old Female who presents after a fall with noted right frontal SAH, neurologically intact. GCS 14 ([id number])    Recommend:  Repeat CTH in 4-6 hours  HOB 30 degrees  Maintain SBP < 160  If repeat CTH shows no change, disposition as per primary team  Will d/w Dr. Arita 88 year old Female who presents after a fall with noted left frontal traumatic SAH, neurologically intact. GCS 15 ([id number])    Recommend:  Repeat CTH in 4-6 hours  HOB 30 degrees  Maintain SBP < 160  If repeat CTH shows no change, disposition as per primary team  D/w Dr. Arita

## 2024-01-08 NOTE — CONSULT NOTE ADULT - ASSESSMENT
87 y/o Female with PMH of HTN presents to  ED s/p fall with:       1. Traumatic Subarachnoid Hemorrhage       -Patient s/p fall at home. CODE Trauma called in the ED. CT Head with.  -Patient with large laceration to head.               Plan discussed with ICU Attending and Trauma Attending. Patient and Son understanding and agreeable to plan.      89 y/o Female with PMH of HTN presents to  ED s/p fall with:       1. Traumatic Subarachnoid Hemorrhage       -Patient s/p fall at home. CODE Trauma called in the ED. CT Head with.  -Patient with large laceration to head.               Plan discussed with ICU Attending and Trauma Attending. Patient and Son understanding and agreeable to plan.      87 y/o Female with PMH of HTN presents to  ED s/p fall with:       1. Traumatic Subarachnoid Hemorrhage       -Patient s/p fall at home. CODE Trauma called in the ED. CT Head with Acute subarachnoid hemorrhage is seen involving the high left frontal region. No significant shift or herniation is seen.   -Alert and oriented, mentation at baseline with no acute deficits. Avoid delirogenic medications.  -Neuro checks Q1hrs. Repeat CT Head in Q4-6hrs or stat if change in mentation or neuro exam.   -Given dizziness/syncopal episode will need further workup. EKG on admission with. No overt s/s at this time. TTE ordered, results pending. Cardiology consulted.   -Hypertensive, SBP>180. HR 50s. defer patients outpatient anti-HTN medication with BB. Initiate on Hydralazine 10mg Q6hrs PRN. Goal BP <160. Monitor clinical and laboratory end points of perfusion, maintain MAP >65.  -C Collar in place for stability. Awaiting clearance from Surgery. CT Spine with no acute pathology. Patient with large laceration to head. s/p repair by Surgery.   -CATRINA, baseline Cr unknown. Likely pre-renal. Voiding independently. Trend labs, replete lytes as needed to maintain K>4, Mg>2 and Phos>3.   -Normal WBC and afebrile. No overt s/s of underlying infectious etiology. Monitor off abx. Trend WBC and fever curve.   -H//H stable. Patient not on Full ac. Defer chemical DVT prophylaxis. SCDs in place.             Plan discussed with ICU Attending and Trauma Attending. Patient and Son understanding and agreeable to plan.

## 2024-01-08 NOTE — ED ADULT TRIAGE NOTE - CHIEF COMPLAINT QUOTE
PT presents to er with private aid at 14:25, as per private aid, pt had a mechanical fall with pos head strike,  in attendance for NA and activated at 14:30, bleeding to back of head controlled, neg use of AC.

## 2024-01-08 NOTE — ED ADULT NURSE NOTE - ALCOHOL PRE SCREEN (AUDIT - C)
wash incision area tomorrow while showering and make sure it is clean and dry. If needed cover the site for an other 2 days with regular band aid. none Statement Selected

## 2024-01-08 NOTE — GOALS OF CARE CONVERSATION - ADVANCED CARE PLANNING - NSPROPTRIGHTCAREGIVER_GEN_A_NUR
Subjective:     CC: COVID-19    HPI:  Silvano Collet is a 82 y.o. female was seen today for COVID 19 evaluation. Patient has been COVID 19 positive since 10/28. They were seen with full PPE and observing continued droplet precautions. Condition discussed with nursing staff and treatment reviewed. Recent bloodwork, chest x-ray and vital signs reviewed. Fever curve and oxygen demands reviewed. Nutritional status and intake reviewed. Patient is not demonstrating increased respiratory complaints at this time. Patient has not been febrile in the last 24 hours. Patient is not able to feed themselves. Past Medical History:   Diagnosis Date    Abnormality of gait and mobility due to Right hemiparesis secondary to Left Cerebral Infarcts. [facility] admit 06/25/18. 6/25/2018    This is an 82year old female who was brought to there ER with altered mental status. She was confused the morning of admission; she woke up and was sitting on the edge of the bed and fell to the side. In the ER she was noted to be hypertensive to 910;C systolic and bradycardic to 30's. She was noted to have a complete heart block with non ST elevation infarction. Taken to the cath lab and had    Anemia 6/25/2018    Complete heart block ([address] 6/26/2018    Dysphagia, oropharyngeal phase 6/26/2018    History of PTCA with stents 6/25/2018    [place] (hard of hearing) 6/25/2018    HTN (hypertension) 6/25/2018    Hypothyroidism 6/25/2018    Incontinent of urine 6/26/2018    MI (myocardial infarction) ([address] 6/25/2018    Osteoarthritis of multiple joints 1/15/2020    Pneumonia of both lungs due to infectious organism     Right hemiparesis ([address] 6/25/2018    Severe protein-calorie malnutrition ([address] 6/22/2018    Vascular dementia without behavioral disturbance ([address] 7/30/2018     No past surgical history on file. No family history on file.   Social History     Socioeconomic History    Marital status:      Spouse name: Not on file    Number of children: Not on file    Years of education: Not on file    Highest education level: Not on file   Occupational History    Not on file   Social Needs    Financial resource strain: Not on file    Food insecurity     Worry: Not on file     Inability: Not on file    Transportation needs     Medical: Not on file     Non-medical: Not on file   Tobacco Use    Smoking status: Never Smoker    Smokeless tobacco: Never Used   Substance and Sexual Activity    Alcohol use: No    Drug use: No     Comment: unable to answer at this time.  Sexual activity: Never   Lifestyle    Physical activity     Days per week: Not on file     Minutes per session: Not on file    Stress: Not on file   Relationships    Social connections     Talks on phone: Not on file     Gets together: Not on file     Attends [employer] service: Not on file     Active member of club or organization: Not on file     Attends meetings of clubs or organizations: Not on file     Relationship status: Not on file    Intimate partner violence     Fear of current or ex partner: Not on file     Emotionally abused: Not on file     Physically abused: Not on file     Forced sexual activity: Not on file   Other Topics Concern    Not on file   Social History Narrative         Lives With: Daughter    Type of Home: House    Home Layout: One level    Home Access: Level entry    Bathroom Shower/Tub: Walk-in shower, Curtain    Bathroom Toilet: Standard    Home Equipment:  (Pt reported no DME)    Receives Help From: Family    ADL Assistance: Independent    Homemaking Assistance: Independent    Homemaking Responsibilities: No (Pt reported, \"My daughter does all of that. 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 \"     Leisure & Hobbies: watching TV      Current Outpatient Medications on File Prior to Visit   Medication Sig Dispense Refill    losartan (COZAAR) 50 MG tablet Take 1 tablet by mouth 2 times daily 30 tablet 3    miconazole (MICOTIN) 2 % powder Apply topically 2 times daily. 45 g 1    hydrochlorothiazide (HYDRODIURIL) 25 MG tablet Take 0.5 tablets by mouth daily 30 tablet 3    levothyroxine (SYNTHROID) 25 MCG tablet Take 1 tablet by mouth Daily 30 tablet 3    metoprolol succinate (TOPROL XL) 25 MG extended release tablet Take 1 tablet by mouth daily 30 tablet 3    clopidogrel (PLAVIX) 75 MG tablet Take 1 tablet by mouth daily 30 tablet 3    simvastatin (ZOCOR) 20 MG tablet Take 1 tablet by mouth nightly 30 tablet 3    aspirin 81 MG chewable tablet Take 1 tablet by mouth daily 30 tablet 3     No current facility-administered medications on file prior to visit. Review of Systems   Unable to perform ROS: Dementia       Objective:     Physical Exam  Vitals signs and nursing note reviewed. Constitutional:       Appearance: She is ill-appearing. HENT:      Head: Normocephalic and atraumatic. Mouth/Throat:      Mouth: Mucous membranes are dry. Eyes:      Extraocular Movements: Extraocular movements intact. Neck:      Musculoskeletal: Normal range of motion. Cardiovascular:      Rate and Rhythm: Normal rate. Pulmonary:      Breath sounds: No rhonchi. Abdominal:      Palpations: Abdomen is soft. Tenderness: There is no abdominal tenderness. Neurological:      Mental Status: She is disoriented. Aman Laughlin [id number]  Lab Results   Component Value Date    FERRITIN 104.8 01/23/2016     Lab Results   Component Value Date    WBC 9.1 07/27/2020    HGB 10.0 (L) 07/27/2020    HCT 31.1 (L) 07/27/2020    MCV 88.7 07/27/2020     (H) 07/27/2020     Lab Results   Component Value Date     07/27/2020    K 4.1 07/27/2020    K 4.0 01/04/2019     07/27/2020    CO2 22 07/27/2020    BUN 21 07/27/2020    CREATININE 0.46 07/27/2020    GLUCOSE 81 07/27/2020    CALCIUM 8.8 07/27/2020      Lab Results   Component Value Date    CKTOTAL 33 06/17/2018    CKMB <0.1 06/17/2018    CKMBINDEX 0.3 06/17/2018    TROPONINI 0.168 (HH) 06/21/2018     No results found for: CRP   No results found for: [facility]   Lab Results   Component Value Date    CKTOTAL 33 06/17/2018       No results found. Aliza rivera      Assessment & Plan:     Continue with current regimen of  Vitamin C 500mg PO BID  Vitamin D 1000 IU PO QD  Zinc 50 mg PO QD  Lovenox 30mg SQ BID    Patient does require Dexamethasone 6mg PO QD  Patient does not  require antibiotics for concomitant bacterial pneumonia. Continue to monitor blood work:  Weekly D-Dimer, LDH, CRP, CPK, Procalcitonin, Ferritin, Troponin, CBC, BMP. Patient's case discussed with nursing staff and Code Status reviewed. Jose Lynn MD           Marion Thomas is a 82 y.o. female being evaluated by a Virtual Visit (video visit) encounter to address concerns as mentioned above. A caregiver was present when appropriate. Due to this being a TeleHealth encounter (During [facility] emergency), evaluation of the following organ systems was limited: Vitals/Constitutional/EENT/Resp/CV/GI//MS/Neuro/Skin/Heme-Lymph-Imm. Pursuant to the emergency declaration under the [address] [place] [address], [address] [place] [address] and the [employer] Act, this Virtual Visit was conducted with patient's (and/or legal guardian's) consent, to reduce the patient's risk of exposure to COVID-19 and provide necessary medical care. The patient (and/or legal guardian) has also been advised to contact this office for worsening conditions or problems, and seek emergency medical treatment and/or call 911 if deemed necessary.      Patient identification was verified at the start of the visit: Yes    Total time spent for this encounter: Not billed by time    Services were provided through a video synchronous no discussion virtually to substitute for in-person clinic visit. Patient and provider were located at their individual homes. --Angie Rubio MD on 12/1/2020 at 10:41 AM    An electronic signature was used to authenticate this note.

## 2024-01-08 NOTE — ED PROVIDER NOTE - WET READ LAUNCH FT
Impression: Horseshoe tear of retina without detachment, right eye: H33.311. OD. Condition: unstable. Vision: vision not affected Hx of PCA OS for retina tear repair 2/5/2016 Plan: Discussed diagnosis in detail with patient. GONIO exam OD shows small vitreous hemorrhage clearing, multiple small hemorrhages and a suspicious area possible retinal tear, no retinal detachments. Discussed risks of progression. Recommend Emergent Laser treatment to repair the Retinal Tear RIGHT EYE ONLY and reduce the risk of Retinal Detachment. Discussed risks/benefits of laser TX. All questions answered. Patient elects to proceed with recommendation. RL1 OCT shows ERM Patients travelers insurance documents filled out and signed, patient may travel without any complications. There are no Wet Read(s) to document.

## 2024-01-08 NOTE — ED ADULT NURSE NOTE - OBJECTIVE STATEMENT
Pt is an A&O4 88YOF who is here s/p fall, pts aide Nancy is at the bedside and states that patient was complaining of light headedness and dizziness prior to falling out of bed, Myrandal states she put her in the bed, walked out of the room and heard the patient hit the floor, the floor was carpeted and when she got to the patient she noticed a laceration to the back of her head with a bump, pt did not have any changes in mental status, pt denies any visual changes, nausea, vomiting, dizziness currently, pt denies a headache, states that her head started hurting when the C-collar was placed.

## 2024-01-08 NOTE — ED PROVIDER NOTE - SKIN, MLM
Skin normal color for race, warm, dry and intact. No evidence of rash. 2 cm laceration to the occiput.

## 2024-01-08 NOTE — ED PROVIDER NOTE - OBJECTIVE STATEMENT
87 y/o female with PMHx of dementia, HTN, mitral valve prolapse, hemorrhoids presents to the ED with home aide c/o posterior head injury s/p fall out of bed with +headstrike against carpeted floor. Aide states she had put patient into bed due to complaints of dizziness and suspects she fell trying to get out of bed. Patient sustained injury to posterior scalp with bleeding which is now controlled. Denies blood thinner use. 89 y/o female with PMHx of dementia, HTN, mitral valve prolapse, hemorrhoids presents to the ED with home aide c/o posterior head injury s/p fall out of bed with +headstrike against carpeted floor. Aide states she had put patient into bed due to complaints of dizziness and suspects she fell trying to get out of bed. Patient sustained injury to posterior scalp with bleeding which is now controlled. Denies blood thinner use.

## 2024-01-09 LAB
ALBUMIN SERPL ELPH-MCNC: 3.1 G/DL — LOW (ref 3.3–5)
ALBUMIN SERPL ELPH-MCNC: 3.1 G/DL — LOW (ref 3.3–5)
ALP SERPL-CCNC: 81 U/L — SIGNIFICANT CHANGE UP (ref 40–120)
ALP SERPL-CCNC: 81 U/L — SIGNIFICANT CHANGE UP (ref 40–120)
ALT FLD-CCNC: 34 U/L — SIGNIFICANT CHANGE UP (ref 12–78)
ALT FLD-CCNC: 34 U/L — SIGNIFICANT CHANGE UP (ref 12–78)
ANION GAP SERPL CALC-SCNC: 3 MMOL/L — LOW (ref 5–17)
ANION GAP SERPL CALC-SCNC: 3 MMOL/L — LOW (ref 5–17)
APPEARANCE UR: ABNORMAL
APPEARANCE UR: ABNORMAL
AST SERPL-CCNC: 31 U/L — SIGNIFICANT CHANGE UP (ref 15–37)
AST SERPL-CCNC: 31 U/L — SIGNIFICANT CHANGE UP (ref 15–37)
BASOPHILS # BLD AUTO: 0.01 K/UL — SIGNIFICANT CHANGE UP (ref 0–0.2)
BASOPHILS # BLD AUTO: 0.01 K/UL — SIGNIFICANT CHANGE UP (ref 0–0.2)
BASOPHILS NFR BLD AUTO: 0.1 % — SIGNIFICANT CHANGE UP (ref 0–2)
BASOPHILS NFR BLD AUTO: 0.1 % — SIGNIFICANT CHANGE UP (ref 0–2)
BILIRUB SERPL-MCNC: 0.5 MG/DL — SIGNIFICANT CHANGE UP (ref 0.2–1.2)
BILIRUB SERPL-MCNC: 0.5 MG/DL — SIGNIFICANT CHANGE UP (ref 0.2–1.2)
BILIRUB UR-MCNC: NEGATIVE — SIGNIFICANT CHANGE UP
BILIRUB UR-MCNC: NEGATIVE — SIGNIFICANT CHANGE UP
BUN SERPL-MCNC: 22 MG/DL — SIGNIFICANT CHANGE UP (ref 7–23)
BUN SERPL-MCNC: 22 MG/DL — SIGNIFICANT CHANGE UP (ref 7–23)
CALCIUM SERPL-MCNC: 9.1 MG/DL — SIGNIFICANT CHANGE UP (ref 8.5–10.1)
CALCIUM SERPL-MCNC: 9.1 MG/DL — SIGNIFICANT CHANGE UP (ref 8.5–10.1)
CHLORIDE SERPL-SCNC: 106 MMOL/L — SIGNIFICANT CHANGE UP (ref 96–108)
CHLORIDE SERPL-SCNC: 106 MMOL/L — SIGNIFICANT CHANGE UP (ref 96–108)
CO2 SERPL-SCNC: 29 MMOL/L — SIGNIFICANT CHANGE UP (ref 22–31)
CO2 SERPL-SCNC: 29 MMOL/L — SIGNIFICANT CHANGE UP (ref 22–31)
COLOR SPEC: YELLOW — SIGNIFICANT CHANGE UP
COLOR SPEC: YELLOW — SIGNIFICANT CHANGE UP
CREAT SERPL-MCNC: 0.72 MG/DL — SIGNIFICANT CHANGE UP (ref 0.5–1.3)
CREAT SERPL-MCNC: 0.72 MG/DL — SIGNIFICANT CHANGE UP (ref 0.5–1.3)
DIFF PNL FLD: NEGATIVE — SIGNIFICANT CHANGE UP
DIFF PNL FLD: NEGATIVE — SIGNIFICANT CHANGE UP
EGFR: 81 ML/MIN/1.73M2 — SIGNIFICANT CHANGE UP
EGFR: 81 ML/MIN/1.73M2 — SIGNIFICANT CHANGE UP
EOSINOPHIL # BLD AUTO: 0.02 K/UL — SIGNIFICANT CHANGE UP (ref 0–0.5)
EOSINOPHIL # BLD AUTO: 0.02 K/UL — SIGNIFICANT CHANGE UP (ref 0–0.5)
EOSINOPHIL NFR BLD AUTO: 0.2 % — SIGNIFICANT CHANGE UP (ref 0–6)
EOSINOPHIL NFR BLD AUTO: 0.2 % — SIGNIFICANT CHANGE UP (ref 0–6)
GLUCOSE SERPL-MCNC: 103 MG/DL — HIGH (ref 70–99)
GLUCOSE SERPL-MCNC: 103 MG/DL — HIGH (ref 70–99)
GLUCOSE UR QL: NEGATIVE MG/DL — SIGNIFICANT CHANGE UP
GLUCOSE UR QL: NEGATIVE MG/DL — SIGNIFICANT CHANGE UP
HCT VFR BLD CALC: 39 % — SIGNIFICANT CHANGE UP (ref 34.5–45)
HCT VFR BLD CALC: 39 % — SIGNIFICANT CHANGE UP (ref 34.5–45)
HGB BLD-MCNC: 12.7 G/DL — SIGNIFICANT CHANGE UP (ref 11.5–15.5)
HGB BLD-MCNC: 12.7 G/DL — SIGNIFICANT CHANGE UP (ref 11.5–15.5)
IMM GRANULOCYTES NFR BLD AUTO: 6.7 % — HIGH (ref 0–0.9)
IMM GRANULOCYTES NFR BLD AUTO: 6.7 % — HIGH (ref 0–0.9)
KETONES UR-MCNC: NEGATIVE MG/DL — SIGNIFICANT CHANGE UP
KETONES UR-MCNC: NEGATIVE MG/DL — SIGNIFICANT CHANGE UP
LEUKOCYTE ESTERASE UR-ACNC: NEGATIVE — SIGNIFICANT CHANGE UP
LEUKOCYTE ESTERASE UR-ACNC: NEGATIVE — SIGNIFICANT CHANGE UP
LYMPHOCYTES # BLD AUTO: 0.58 K/UL — LOW (ref 1–3.3)
LYMPHOCYTES # BLD AUTO: 0.58 K/UL — LOW (ref 1–3.3)
LYMPHOCYTES # BLD AUTO: 6.5 % — LOW (ref 13–44)
LYMPHOCYTES # BLD AUTO: 6.5 % — LOW (ref 13–44)
MAGNESIUM SERPL-MCNC: 2.2 MG/DL — SIGNIFICANT CHANGE UP (ref 1.6–2.6)
MAGNESIUM SERPL-MCNC: 2.2 MG/DL — SIGNIFICANT CHANGE UP (ref 1.6–2.6)
MCHC RBC-ENTMCNC: 29.8 PG — SIGNIFICANT CHANGE UP (ref 27–34)
MCHC RBC-ENTMCNC: 29.8 PG — SIGNIFICANT CHANGE UP (ref 27–34)
MCHC RBC-ENTMCNC: 32.6 GM/DL — SIGNIFICANT CHANGE UP (ref 32–36)
MCHC RBC-ENTMCNC: 32.6 GM/DL — SIGNIFICANT CHANGE UP (ref 32–36)
MCV RBC AUTO: 91.5 FL — SIGNIFICANT CHANGE UP (ref 80–100)
MCV RBC AUTO: 91.5 FL — SIGNIFICANT CHANGE UP (ref 80–100)
MONOCYTES # BLD AUTO: 0.96 K/UL — HIGH (ref 0–0.9)
MONOCYTES # BLD AUTO: 0.96 K/UL — HIGH (ref 0–0.9)
MONOCYTES NFR BLD AUTO: 10.8 % — SIGNIFICANT CHANGE UP (ref 2–14)
MONOCYTES NFR BLD AUTO: 10.8 % — SIGNIFICANT CHANGE UP (ref 2–14)
NEUTROPHILS # BLD AUTO: 6.75 K/UL — SIGNIFICANT CHANGE UP (ref 1.8–7.4)
NEUTROPHILS # BLD AUTO: 6.75 K/UL — SIGNIFICANT CHANGE UP (ref 1.8–7.4)
NEUTROPHILS NFR BLD AUTO: 75.7 % — SIGNIFICANT CHANGE UP (ref 43–77)
NEUTROPHILS NFR BLD AUTO: 75.7 % — SIGNIFICANT CHANGE UP (ref 43–77)
NITRITE UR-MCNC: NEGATIVE — SIGNIFICANT CHANGE UP
NITRITE UR-MCNC: NEGATIVE — SIGNIFICANT CHANGE UP
PH UR: 7 — SIGNIFICANT CHANGE UP (ref 5–8)
PH UR: 7 — SIGNIFICANT CHANGE UP (ref 5–8)
PHOSPHATE SERPL-MCNC: 3 MG/DL — SIGNIFICANT CHANGE UP (ref 2.5–4.5)
PHOSPHATE SERPL-MCNC: 3 MG/DL — SIGNIFICANT CHANGE UP (ref 2.5–4.5)
PLATELET # BLD AUTO: 150 K/UL — SIGNIFICANT CHANGE UP (ref 150–400)
PLATELET # BLD AUTO: 150 K/UL — SIGNIFICANT CHANGE UP (ref 150–400)
POTASSIUM SERPL-MCNC: 3.5 MMOL/L — SIGNIFICANT CHANGE UP (ref 3.5–5.3)
POTASSIUM SERPL-MCNC: 3.5 MMOL/L — SIGNIFICANT CHANGE UP (ref 3.5–5.3)
POTASSIUM SERPL-SCNC: 3.5 MMOL/L — SIGNIFICANT CHANGE UP (ref 3.5–5.3)
POTASSIUM SERPL-SCNC: 3.5 MMOL/L — SIGNIFICANT CHANGE UP (ref 3.5–5.3)
PROT SERPL-MCNC: 6.7 GM/DL — SIGNIFICANT CHANGE UP (ref 6–8.3)
PROT SERPL-MCNC: 6.7 GM/DL — SIGNIFICANT CHANGE UP (ref 6–8.3)
PROT UR-MCNC: 30 MG/DL
PROT UR-MCNC: 30 MG/DL
RBC # BLD: 4.26 M/UL — SIGNIFICANT CHANGE UP (ref 3.8–5.2)
RBC # BLD: 4.26 M/UL — SIGNIFICANT CHANGE UP (ref 3.8–5.2)
RBC # FLD: 14.9 % — HIGH (ref 10.3–14.5)
RBC # FLD: 14.9 % — HIGH (ref 10.3–14.5)
SODIUM SERPL-SCNC: 138 MMOL/L — SIGNIFICANT CHANGE UP (ref 135–145)
SODIUM SERPL-SCNC: 138 MMOL/L — SIGNIFICANT CHANGE UP (ref 135–145)
SP GR SPEC: 1.02 — SIGNIFICANT CHANGE UP (ref 1–1.03)
SP GR SPEC: 1.02 — SIGNIFICANT CHANGE UP (ref 1–1.03)
TSH SERPL-MCNC: 1.6 UU/ML — SIGNIFICANT CHANGE UP (ref 0.34–4.82)
TSH SERPL-MCNC: 1.6 UU/ML — SIGNIFICANT CHANGE UP (ref 0.34–4.82)
UROBILINOGEN FLD QL: 1 MG/DL — SIGNIFICANT CHANGE UP (ref 0.2–1)
UROBILINOGEN FLD QL: 1 MG/DL — SIGNIFICANT CHANGE UP (ref 0.2–1)
WBC # BLD: 8.92 K/UL — SIGNIFICANT CHANGE UP (ref 3.8–10.5)
WBC # BLD: 8.92 K/UL — SIGNIFICANT CHANGE UP (ref 3.8–10.5)
WBC # FLD AUTO: 8.92 K/UL — SIGNIFICANT CHANGE UP (ref 3.8–10.5)
WBC # FLD AUTO: 8.92 K/UL — SIGNIFICANT CHANGE UP (ref 3.8–10.5)

## 2024-01-09 PROCEDURE — 93010 ELECTROCARDIOGRAM REPORT: CPT

## 2024-01-09 PROCEDURE — 99233 SBSQ HOSP IP/OBS HIGH 50: CPT

## 2024-01-09 PROCEDURE — 99233 SBSQ HOSP IP/OBS HIGH 50: CPT | Mod: GC

## 2024-01-09 PROCEDURE — 93306 TTE W/DOPPLER COMPLETE: CPT | Mod: 26

## 2024-01-09 RX ORDER — ACETAMINOPHEN 500 MG
575 TABLET ORAL ONCE
Refills: 0 | Status: COMPLETED | OUTPATIENT
Start: 2024-01-09 | End: 2024-01-09

## 2024-01-09 RX ORDER — LOSARTAN POTASSIUM 100 MG/1
50 TABLET, FILM COATED ORAL ONCE
Refills: 0 | Status: COMPLETED | OUTPATIENT
Start: 2024-01-09 | End: 2024-01-09

## 2024-01-09 RX ADMIN — CHLORHEXIDINE GLUCONATE 1 APPLICATION(S): 213 SOLUTION TOPICAL at 09:32

## 2024-01-09 RX ADMIN — SODIUM CHLORIDE 80 MILLILITER(S): 9 INJECTION INTRAMUSCULAR; INTRAVENOUS; SUBCUTANEOUS at 21:59

## 2024-01-09 RX ADMIN — Medication 575 MILLIGRAM(S): at 04:21

## 2024-01-09 RX ADMIN — LOSARTAN POTASSIUM 50 MILLIGRAM(S): 100 TABLET, FILM COATED ORAL at 18:31

## 2024-01-09 RX ADMIN — Medication 230 MILLIGRAM(S): at 01:45

## 2024-01-09 RX ADMIN — LOSARTAN POTASSIUM 50 MILLIGRAM(S): 100 TABLET, FILM COATED ORAL at 09:31

## 2024-01-09 RX ADMIN — MIRTAZAPINE 7.5 MILLIGRAM(S): 45 TABLET, ORALLY DISINTEGRATING ORAL at 21:59

## 2024-01-09 RX ADMIN — OLANZAPINE 5 MILLIGRAM(S): 15 TABLET, FILM COATED ORAL at 22:00

## 2024-01-09 RX ADMIN — SODIUM CHLORIDE 80 MILLILITER(S): 9 INJECTION INTRAMUSCULAR; INTRAVENOUS; SUBCUTANEOUS at 09:39

## 2024-01-09 RX ADMIN — SENNA PLUS 2 TABLET(S): 8.6 TABLET ORAL at 21:59

## 2024-01-09 NOTE — DIETITIAN INITIAL EVALUATION ADULT - PERTINENT MEDS FT
MEDICATIONS  (STANDING):  chlorhexidine 4% Liquid 1 Application(s) Topical <User Schedule>  losartan 50 milliGRAM(s) Oral once  losartan 100 milliGRAM(s) Oral daily  mirtazapine 7.5 milliGRAM(s) Oral daily  OLANZapine Disintegrating Tablet 5 milliGRAM(s) Oral at bedtime  senna 2 Tablet(s) Oral at bedtime  sodium chloride 0.9%. 1000 milliLiter(s) (80 mL/Hr) IV Continuous <Continuous>    Home Medications:  atenolol 25 mg oral tablet: 1 tab(s) orally once a day as needed for BP&gt;150 (2024 16:57)  Colace 100 mg oral capsule: 2 cap(s) orally once a day with lunch (2024 16:57)  irbesartan 150 mg oral tablet: 1 tab(s) orally once a day (2024 16:55)  Linzess 72 mcg oral capsule: 1 cap(s) orally once a day (2024 16:57)  magnesium oxide 250 mg oral tablet: 1 tab(s) orally once a day (2024 16:57)  mirtazapine 7.5 mg oral tablet: 1 tab(s) orally once a day (at bedtime) (2024 16:57)  OLANZapine 5 mg oral tablet, disintegratin tab(s) orally once a day (at bedtime) (2024 16:55)

## 2024-01-09 NOTE — PHYSICAL THERAPY INITIAL EVALUATION ADULT - IMPAIRMENTS FOUND, PT EVAL
pt feels abdomen distended and legs are swollen which they are not , otherwise she is very cachectic/gait, locomotion, and balance/muscle strength

## 2024-01-09 NOTE — PATIENT PROFILE ADULT - FALL HARM RISK - HARM RISK INTERVENTIONS
Assistance with ambulation/Assistance OOB with selected safe patient handling equipment/Communicate Risk of Fall with Harm to all staff/Discuss with provider need for PT consult/Monitor gait and stability/Reinforce activity limits and safety measures with patient and family/Sit up slowly, dangle for a short time, stand at bedside before walking/Tailored Fall Risk Interventions/Visual Cue: Yellow wristband and red socks/Bed in lowest position, wheels locked, appropriate side rails in place/Call bell, personal items and telephone in reach/Instruct patient to call for assistance before getting out of bed or chair/Non-slip footwear when patient is out of bed/[place] to call system/Physically safe environment - no spills, clutter or unnecessary equipment/Purposeful Proactive Rounding/Room/bathroom lighting operational, light cord in reach Assistance with ambulation/Assistance OOB with selected safe patient handling equipment/Communicate Risk of Fall with Harm to all staff/Discuss with provider need for PT consult/Monitor gait and stability/Reinforce activity limits and safety measures with patient and family/Sit up slowly, dangle for a short time, stand at bedside before walking/Tailored Fall Risk Interventions/Visual Cue: Yellow wristband and red socks/Bed in lowest position, wheels locked, appropriate side rails in place/Call bell, personal items and telephone in reach/Instruct patient to call for assistance before getting out of bed or chair/Non-slip footwear when patient is out of bed/[place] to call system/Physically safe environment - no spills, clutter or unnecessary equipment/Purposeful Proactive Rounding/Room/bathroom lighting operational, light cord in reach

## 2024-01-09 NOTE — PHYSICAL THERAPY INITIAL EVALUATION ADULT - DIAGNOSIS, PT EVAL
mechanical fall with L frontal SAH, h/o Sjogren's Syndrome, h/o leukopenia (follows with Dr Sylwia SALDAÑA) Osteoporosis

## 2024-01-09 NOTE — PROGRESS NOTE ADULT - ASSESSMENT
A/P: 88 female who presents S/P suncopal episode resulting in a traumatic SAH  HTN    Plan:  Transfer to tele  Syncope W/U  Cardio consult  Increase losartan to 100  D/C BBx because of Bradycarsia  ECHO  Po Diet  nutrition Eval  Venodynes    D/W Son, AID, and Trauma

## 2024-01-09 NOTE — PHYSICAL THERAPY INITIAL EVALUATION ADULT - ADDITIONAL COMMENTS
pt has 1 LALO home and 1 step inside , per HHA is normally able to get herself up unassisted using RW

## 2024-01-09 NOTE — PHYSICAL THERAPY INITIAL EVALUATION ADULT - GENERAL OBSERVATIONS, REHAB EVAL
resting in bed with IVF infusing  LUE , pt is cachectic/underweight,appears very frail, slender ;foam dressings in place over L/S spine over spine /bony sacrum

## 2024-01-09 NOTE — DIETITIAN INITIAL EVALUATION ADULT - OTHER INFO
88 year old Female with a past medical history of HTN not on any blood thinners or anti-platelet agents who presents after a fall at home. Patient states that she was in the bathroom when she felt unsteady and dizzy and fell onto the ground with no laceration.  Trauma surgery consulted for mechanical fall resulting in Left frontal SAH. C collar cleared- can be removed. Patient refused lac repair yesterday and overnight, will revisit again today washout and bedside repair. Plan for transfer to tele.    Pt sleeping at time of RD visit, unable to wake. Appears very thin, frail, bony with NFPE revealing mod to severe muscle/ fat wasting; meets clear criteria for PCM. ICU RN bed scale wt taken on 1/9 at 83#, appears accurate. Diet now advanced to regular, will add ensure shakes BID. Pt has been seen in past by RD service in 2021 and 2020, met criteria for PCM previously. Last wt on 8/6/21 at 88#. Would suggest to Confirm goals of care regarding nutrition support - Nutrition support is not recommended due to overall declining medical status/ advanced age which evidenced based studies indicate EN is not effective in prolonging survival and improving quality of life. It can also increase risk of aspiration pneumonia as well as other related issues (infection, GI complications, and worsening/ non-healing PI's). However, will provide nutrition/ hydration within GOC. See additional recs below.

## 2024-01-09 NOTE — PHYSICAL THERAPY INITIAL EVALUATION ADULT - PLANNED THERAPY INTERVENTIONS, PT EVAL
home safety/fall prevention/balance training/bed mobility training/gait training/strengthening/transfer training

## 2024-01-09 NOTE — PHYSICAL THERAPY INITIAL EVALUATION ADULT - MARITAL STATUS
gregoria Caro is [facility] #[phone]/Single gregoria Caro is [facility] #[phone]/Single

## 2024-01-09 NOTE — DIETITIAN INITIAL EVALUATION ADULT - NSFNSGIIOFT_GEN_A_CORE
I&O's Detail    08 Jan 2024 07:01  -  09 Jan 2024 07:00  --------------------------------------------------------  IN:    sodium chloride 0.9%: 660 mL  Total IN: 660 mL    OUT:    Voided (mL): 500 mL  Total OUT: 500 mL    Total NET: 160 mL

## 2024-01-09 NOTE — DIETITIAN INITIAL EVALUATION ADULT - NUTRITIONGOAL OUTCOME1
Pt will consume >/= 80% of all meals and supplements   Will provide nutrition/ hydration within goals of care

## 2024-01-09 NOTE — DIETITIAN INITIAL EVALUATION ADULT - LAB (SPECIFY)
Consider obtaining vitamin D 25OH level to assess nutriture   consider checking B6, B12, thiamine, folate, carnitine, and copper levels as malnutrition in cause these to be deficient

## 2024-01-09 NOTE — CHART NOTE - NSCHARTNOTEFT_GEN_A_CORE
Patient being downgraded out of ICU to telemetry, hospitalist service to follow as consultants with trauma surgery primary. Patient seen in ICU and plan of care discussed with attending Dr. Villarreal     In summary:  88 year old Female with a past medical history of HTN not on any blood thinners or anti-platelet agents who presents after a fall at home, concerning for possible pre-syncopal event. Patient states that she was in the bathroom when she felt unsteady and dizzy and fell onto the ground with resulting head strike, denies LOC at time. Pt lives at home with HHA. At this time, the patient denies any headaches, nausea, vomiting, dizziness or vision changes. Trauma surgery consulted in ED for mechanical fall resulting in Left frontal SAH, subsequently admitted to ICU for further management/observation.     A/P:    #Left frontal SAH  #s/p fall, possible pre-syncope   - CT head with left frontal SAH s/p fall , repeat CT head stable   - Trauma surgery primary   - NSGY consult appreciated  - CT C/A/P negative for acute pathology, UA negative   - C/w neuro checks q4   - pain control PRN  - C/w IVF @ 80ml/hr  - Tele monitoring, periods  - Check orthostatic VS  - Check TSH/T4  - F/u TTE  - F/u Cardio consult   - F/u PT consult     #HTN urgency   #Sinus bradycardia   - Goal SBP < 160  - Pt hypertensive overnight requiring IV hydralazine x1, cont PRN   - BB d/c'd given mild bradycardia on tele, increased losartan to 100mg QD  - continue to monitor BPs     #DVT ppx   - SCDs given above

## 2024-01-09 NOTE — PROGRESS NOTE ADULT - ASSESSMENT
88 F w/ left frontal SAH s/p mechanical fall at home not on any AC.    Plan:     [id number]  repeat CTH in 4-6 hours  monitor Na levels  home meds reconciled   C collar cleared- can be removed  Pain meds PRN  Patient refused lac repair yesterday and overnight, will revisit again today washout and bedside repair  ICU care    Plan discussed with Dr. Márquez 88 F w/ left frontal SAH s/p mechanical fall at home not on any AC.    Plan:     [id number]  repeat CTH in 4-6 hours  monitor Na levels  home meds reconciled   C collar cleared- can be removed  Pain meds PRN  Patient refused lac repair yesterday and overnight, will revisit again today washout and bedside repair  ICU care    Plan discussed with Dr. Márquez

## 2024-01-09 NOTE — DIETITIAN INITIAL EVALUATION ADULT - NAME AND PHONE
Missy Graham (Formerly Salome), MS, RDN, CNSC, [phone]  Certified Nutrition   Missy Graham (Formerly Salome), MS, RDN, CNSC, [phone]  Certified Nutrition

## 2024-01-09 NOTE — PHYSICAL THERAPY INITIAL EVALUATION ADULT - NSPTDISCHREC_GEN_A_CORE
resume private Home PT (Stu [phone]) whom pt is well known to and established rapport per COLEMAN Daniel at bedside/Home PT resume private Home PT (Stu [phone]) whom pt is well known to and established rapport per COLEMAN Daniel at bedside/Home PT resume private Home PT (Stu [phone]) whom pt is well known to and established rapport per HHA  at bedside/Home PT resume private Home PT (Stu [phone]) whom pt is well known to and established rapport per HHA  at bedside/Home PT

## 2024-01-09 NOTE — PHYSICAL THERAPY INITIAL EVALUATION ADULT - PATIENT PROFILE REVIEW, REHAB EVAL
last hospitalized 8/1-8/9/23 with mechanical fall in ground level shower sustaining multiple thoracic compression fxs ; pt resides own home/yes

## 2024-01-09 NOTE — PROGRESS NOTE ADULT - ASSESSMENT
88 year old Female who presents after a fall with noted left frontal traumatic SAH, neurologically intact. GCS 15 ([id number]) stable on repeat CTH    Recommend:  Can liberate neuro checks to q 4 hours  Maintain SBP < 160  Neurosurgery to sign off at this time, please re-consult PRN  D/w Dr. Arita

## 2024-01-09 NOTE — PROGRESS NOTE ADULT - SUBJECTIVE AND OBJECTIVE BOX
Patient is a 88y old  Female who presents with a chief complaint of     BRIEF HOSPITAL COURSE:   88F PMHx HTN presents to  ED s/p fall with Traumatic Subarachnoid Hemorrhage     Events last 24 hours:   -Repeat CT head performed tonight with stable SAH. Mentation, neurologic examination unchanged.   -Hypertensive to SBP 190s earlier, given Hydralazine 5mg IVPx1 with adequate response.   -Satting well on RA.     PAST MEDICAL & SURGICAL HISTORY:  HTN (hypertension)  Hemorrhoids  Fecal impaction  MVP (mitral valve prolapse)  Dizziness  No significant past surgical history  No significant past surgical histor    Review of Systems:  CONSTITUTIONAL: No fever, chills, or fatigue  EYES: No eye pain, visual disturbances, or discharge  ENMT:  No difficulty hearing, tinnitus, vertigo; No sinus or throat pain  NECK: No pain or stiffness  RESPIRATORY: No cough, wheezing, chills or hemoptysis; No shortness of breath  CARDIOVASCULAR: No chest pain, palpitations, dizziness, or leg swelling  GASTROINTESTINAL: No abdominal or epigastric pain. No nausea, vomiting, or hematemesis; No diarrhea or constipation. No melena or hematochezia.  GENITOURINARY: No dysuria, frequency, hematuria, or incontinence  NEUROLOGICAL: No headaches, memory loss, loss of strength, numbness, or tremors  SKIN: No itching, burning, rashes, or lesions   MUSCULOSKELETAL: No joint pain or swelling; No muscle, back, or extremity pain  PSYCHIATRIC: No depression, anxiety, mood swings, or difficulty sleeping    Medications:  atenolol  Tablet 25 milliGRAM(s) Oral daily PRN  losartan 50 milliGRAM(s) Oral daily  mirtazapine 7.5 milliGRAM(s) Oral daily  OLANZapine Disintegrating Tablet 5 milliGRAM(s) Oral at bedtime  senna 2 Tablet(s) Oral at bedtime  sodium chloride 0.9%. 1000 milliLiter(s) IV Continuous <Continuous>  chlorhexidine 4% Liquid 1 Application(s) Topical <User Schedule>    ICU Vital Signs Last 24 Hrs  T(C): 36.6 (09 Jan 2024 00:00), Max: 37.2 (08 Jan 2024 20:00)  T(F): 97.9 (09 Jan 2024 00:00), Max: 98.9 (08 Jan 2024 20:00)  HR: 62 (09 Jan 2024 01:00) (56 - 67)  BP: 145/67 (09 Jan 2024 01:00) (129/73 - 202/78)  BP(mean): 89 (09 Jan 2024 01:00) (82 - 115)  ABP: --  ABP(mean): --  RR: 16 (09 Jan 2024 01:00) (10 - 23)  SpO2: 94% (09 Jan 2024 01:00) (94% - 98%)  O2 Parameters below as of 09 Jan 2024 00:00  Patient On (Oxygen Delivery Method): room air    I&O's Detail    LABS:                      12.2   9.54  )-----------( 163      ( 08 Jan 2024 18:47 )             38.3     01-08  139  |  106  |  24<H>  ----------------------------<  114<H>  4.0   |  32<H>  |  0.87  Ca    9.1      08 Jan 2024 18:47  TPro  7.5  /  Alb  3.5  /  TBili  0.5  /  DBili  x   /  AST  44<H>  /  ALT  45  /  AlkPhos  91  01-08    CAPILLARY BLOOD GLUCOSE    PT/INR - ( 08 Jan 2024 13:56 )   PT: 10.4 sec;   INR: 0.92 ratio     PTT - ( 08 Jan 2024 13:56 )  PTT:28.2 sec    Urinalysis Basic - ( 08 Jan 2024 18:47 )  Color: x / Appearance: x / SG: x / pH: x  Gluc: 114 mg/dL / Ketone: x  / Bili: x / Urobili: x   Blood: x / Protein: x / Nitrite: x   Leuk Esterase: x / RBC: x / WBC x   Sq Epi: x / Non Sq Epi: x / Bacteria: x    CULTURES:     Patient is a 88y old  Female who presents with a chief complaint of     BRIEF HOSPITAL COURSE:   88F PMHx HTN presents to  ED s/p fall with Traumatic Subarachnoid Hemorrhage     Events last 24 hours:   -Repeat CT head performed tonight with stable SAH. Mentation, neurologic examination unchanged.   -Hypertensive to SBP 190s earlier, given Hydralazine 5mg IVPx1 with adequate response.   -Satting well on RA.     PAST MEDICAL & SURGICAL HISTORY:  HTN (hypertension)  Hemorrhoids  Fecal impaction  MVP (mitral valve prolapse)  Dizziness  No significant past surgical history  No significant past surgical histor    Review of Systems:  CONSTITUTIONAL: No fever, chills, or fatigue  EYES: No eye pain, visual disturbances, or discharge  ENMT:  No difficulty hearing, tinnitus, vertigo; No sinus or throat pain  NECK: No pain or stiffness  RESPIRATORY: No cough, wheezing, chills or hemoptysis; No shortness of breath  CARDIOVASCULAR: No chest pain, palpitations, dizziness, or leg swelling  GASTROINTESTINAL: No abdominal or epigastric pain. No nausea, vomiting, or hematemesis; No diarrhea or constipation. No melena or hematochezia.  GENITOURINARY: No dysuria, frequency, hematuria, or incontinence  NEUROLOGICAL: No headaches, memory loss, loss of strength, numbness, or tremors  SKIN: No itching, burning, rashes, or lesions   MUSCULOSKELETAL: No joint pain or swelling; No muscle, back, or extremity pain  PSYCHIATRIC: No depression, anxiety, mood swings, or difficulty sleeping    Medications:  atenolol  Tablet 25 milliGRAM(s) Oral daily PRN  losartan 50 milliGRAM(s) Oral daily  mirtazapine 7.5 milliGRAM(s) Oral daily  OLANZapine Disintegrating Tablet 5 milliGRAM(s) Oral at bedtime  senna 2 Tablet(s) Oral at bedtime  sodium chloride 0.9%. 1000 milliLiter(s) IV Continuous <Continuous>  chlorhexidine 4% Liquid 1 Application(s) Topical <User Schedule>    ICU Vital Signs Last 24 Hrs  T(C): 36.6 (09 Jan 2024 00:00), Max: 37.2 (08 Jan 2024 20:00)  T(F): 97.9 (09 Jan 2024 00:00), Max: 98.9 (08 Jan 2024 20:00)  HR: 62 (09 Jan 2024 01:00) (56 - 67)  BP: 145/67 (09 Jan 2024 01:00) (129/73 - 202/78)  BP(mean): 89 (09 Jan 2024 01:00) (82 - 115)  ABP: --  ABP(mean): --  RR: 16 (09 Jan 2024 01:00) (10 - 23)  SpO2: 94% (09 Jan 2024 01:00) (94% - 98%)  O2 Parameters below as of 09 Jan 2024 00:00  Patient On (Oxygen Delivery Method): room air    I&O's Detail    LABS:                      12.2   9.54  )-----------( 163      ( 08 Jan 2024 18:47 )             38.3     01-08  139  |  106  |  24<H>  ----------------------------<  114<H>  4.0   |  32<H>  |  0.87  Ca    9.1      08 Jan 2024 18:47  TPro  7.5  /  Alb  3.5  /  TBili  0.5  /  DBili  x   /  AST  44<H>  /  ALT  45  /  AlkPhos  91  01-08    CAPILLARY BLOOD GLUCOSE    PT/INR - ( 08 Jan 2024 13:56 )   PT: 10.4 sec;   INR: 0.92 ratio     PTT - ( 08 Jan 2024 13:56 )  PTT:28.2 sec    Urinalysis Basic - ( 08 Jan 2024 18:47 )  Color: x / Appearance: x / SG: x / pH: x  Gluc: 114 mg/dL / Ketone: x  / Bili: x / Urobili: x   Blood: x / Protein: x / Nitrite: x   Leuk Esterase: x / RBC: x / WBC x   Sq Epi: x / Non Sq Epi: x / Bacteria: x    CULTURES:    Physical Examination:  General: Alert, oriented, interactive, nonfocal.  HEENT: PERRL.  NECK: Supple.   PULM: Clear to auscultation bilaterally.  CVS: s1/s2.  ABD: Soft, nondistended, nontender, normoactive bowel sounds.  EXT: No edema, nontender.  SKIN: Warm.    RADIOLOGY:   < from: CT Head No Cont (01.08.24 @ 19:43) >  ACC: [id number] EXAM:  CT BRAIN   ORDERED BY: ZENAS RADHA   PROCEDURE DATE:  01/08/2024    IMPRESSION: Stable subarachnoid hemorrhage again seen as described above.    Time spent on this patient encounter, which includes documenting this note in the electronic medical record, was +50 minutes including assessing the presenting problems with associated risks, reviewing the medical record to prepare for the encounter, and meeting face to face with the patient to obtain additional history. I have also performed an appropriate physical exam, made interventions listed and ordered and interpreted appropriate diagnostic studies as documented. To improve communication and patient safety, I have coordinated care with the multidisciplinary team including the bedside nurse, appropriate attending of record and consultants as needed. This time is independent of any procedures performed.    Date of entry of this note is equal to the date of services rendered.  Patient is a 88y old  Female who presents with a chief complaint of     BRIEF HOSPITAL COURSE:   88F PMHx HTN presents to  ED s/p fall with Traumatic Subarachnoid Hemorrhage     Events last 24 hours:   -Repeat CT head performed tonight with stable SAH. Mentation, neurologic examination unchanged.   -Hypertensive to SBP 190s earlier, given Hydralazine 5mg IVPx1 with adequate response.   -Satting well on RA.     PAST MEDICAL & SURGICAL HISTORY:  HTN (hypertension)  Hemorrhoids  Fecal impaction  MVP (mitral valve prolapse)  Dizziness  No significant past surgical history  No significant past surgical histor    Review of Systems:  CONSTITUTIONAL: No fever, chills, or fatigue  EYES: No eye pain, visual disturbances, or discharge  ENMT:  No difficulty hearing, tinnitus, vertigo; No sinus or throat pain  NECK: No pain or stiffness  RESPIRATORY: No cough, wheezing, chills or hemoptysis; No shortness of breath  CARDIOVASCULAR: No chest pain, palpitations, dizziness, or leg swelling  GASTROINTESTINAL: No abdominal or epigastric pain. No nausea, vomiting, or hematemesis; No diarrhea or constipation. No melena or hematochezia.  GENITOURINARY: No dysuria, frequency, hematuria, or incontinence  NEUROLOGICAL: No headaches, memory loss, loss of strength, numbness, or tremors  SKIN: No itching, burning, rashes, or lesions   MUSCULOSKELETAL: No joint pain or swelling; No muscle, back, or extremity pain  PSYCHIATRIC: No depression, anxiety, mood swings, or difficulty sleeping    Medications:  atenolol  Tablet 25 milliGRAM(s) Oral daily PRN  losartan 50 milliGRAM(s) Oral daily  mirtazapine 7.5 milliGRAM(s) Oral daily  OLANZapine Disintegrating Tablet 5 milliGRAM(s) Oral at bedtime  senna 2 Tablet(s) Oral at bedtime  sodium chloride 0.9%. 1000 milliLiter(s) IV Continuous <Continuous>  chlorhexidine 4% Liquid 1 Application(s) Topical <User Schedule>    ICU Vital Signs Last 24 Hrs  T(C): 36.6 (09 Jan 2024 00:00), Max: 37.2 (08 Jan 2024 20:00)  T(F): 97.9 (09 Jan 2024 00:00), Max: 98.9 (08 Jan 2024 20:00)  HR: 62 (09 Jan 2024 01:00) (56 - 67)  BP: 145/67 (09 Jan 2024 01:00) (129/73 - 202/78)  BP(mean): 89 (09 Jan 2024 01:00) (82 - 115)  ABP: --  ABP(mean): --  RR: 16 (09 Jan 2024 01:00) (10 - 23)  SpO2: 94% (09 Jan 2024 01:00) (94% - 98%)  O2 Parameters below as of 09 Jan 2024 00:00  Patient On (Oxygen Delivery Method): room air    I&O's Detail    LABS:                      12.2   9.54  )-----------( 163      ( 08 Jan 2024 18:47 )             38.3     01-08  139  |  106  |  24<H>  ----------------------------<  114<H>  4.0   |  32<H>  |  0.87  Ca    9.1      08 Jan 2024 18:47  TPro  7.5  /  Alb  3.5  /  TBili  0.5  /  DBili  x   /  AST  44<H>  /  ALT  45  /  AlkPhos  91  01-08    CAPILLARY BLOOD GLUCOSE    PT/INR - ( 08 Jan 2024 13:56 )   PT: 10.4 sec;   INR: 0.92 ratio     PTT - ( 08 Jan 2024 13:56 )  PTT:28.2 sec    Urinalysis Basic - ( 08 Jan 2024 18:47 )  Color: x / Appearance: x / SG: x / pH: x  Gluc: 114 mg/dL / Ketone: x  / Bili: x / Urobili: x   Blood: x / Protein: x / Nitrite: x   Leuk Esterase: x / RBC: x / WBC x   Sq Epi: x / Non Sq Epi: x / Bacteria: x    CULTURES:    Physical Examination:  General: Alert, oriented, interactive, nonfocal.  HEENT: PERRL.  NECK: Supple.   PULM: Clear to auscultation bilaterally.  CVS: s1/s2.  ABD: Soft, nondistended, nontender, normoactive bowel sounds.  EXT: No edema, nontender.  SKIN: Warm.    RADIOLOGY:   < from: CT Head No Cont (01.08.24 @ 19:43) >  ACC: [id number] EXAM:  CT BRAIN   ORDERED BY: ZENAS RADHA   PROCEDURE DATE:  01/08/2024    IMPRESSION: Stable subarachnoid hemorrhage again seen as described above.    Time spent on this patient encounter, which includes documenting this note in the electronic medical record, was +50 minutes including assessing the presenting problems with associated risks, reviewing the medical record to prepare for the encounter, and meeting face to face with the patient to obtain additional history. I have also performed an appropriate physical exam, made interventions listed and ordered and interpreted appropriate diagnostic studies as documented. To improve communication and patient safety, I have coordinated care with the multidisciplinary team including the bedside nurse, appropriate attending of record and consultants as needed. This time is independent of any procedures performed.    Date of entry of this note is equal to the date of services rendered.

## 2024-01-09 NOTE — PHYSICAL THERAPY INITIAL EVALUATION ADULT - LIVES WITH, PROFILE
resides [place] with 24/7 HHA named María Santiago resides [place] with 24/7 HHA named María Santiago resides [place] with 24/7 A named Lilibeth resides [place] with 24/7 A named Lilibeth

## 2024-01-09 NOTE — PHYSICAL THERAPY INITIAL EVALUATION ADULT - LEVEL OF INDEPENDENCE: SIT/SUPINE, REHAB EVAL
returned to reclined in bed after consuming 6oz of water from cup ,and c/o that the water made her dizzy/contact guard/minimum assist (75% patients effort)

## 2024-01-09 NOTE — PROGRESS NOTE ADULT - SUBJECTIVE AND OBJECTIVE BOX
SURGERY DAILY PROGRESS NOTE:     Subjective:  Patient seen and examined at bedside during am rounds. Alert and responsive. Stable CTH.   AVSS. Denies any fevers, chills, n/v/d, chest pain or shortness of breath    Objective:    MEDICATIONS  (STANDING):  chlorhexidine 4% Liquid 1 Application(s) Topical <User Schedule>  losartan 50 milliGRAM(s) Oral daily  mirtazapine 7.5 milliGRAM(s) Oral daily  OLANZapine Disintegrating Tablet 5 milliGRAM(s) Oral at bedtime  senna 2 Tablet(s) Oral at bedtime  sodium chloride 0.9%. 1000 milliLiter(s) (80 mL/Hr) IV Continuous <Continuous>    MEDICATIONS  (PRN):  atenolol  Tablet 25 milliGRAM(s) Oral daily PRN BP>150      Vital Signs Last 24 Hrs  T(C): 36.7 (2024 04:00), Max: 37.2 (2024 20:00)  T(F): 98.1 (2024 04:00), Max: 98.9 (2024 20:00)  HR: 57 (2024 06:00) (52 - 67)  BP: 135/68 (2024 06:00) (117/56 - 202/78)  BP(mean): 87 (2024 06:00) (73 - 115)  RR: 16 (2024 06:00) (10 - 23)  SpO2: 94% (2024 06:00) (94% - 98%)    Parameters below as of 2024 00:00  Patient On (Oxygen Delivery Method): room air          PHYSICAL EXAM   Gen: Awake, Alert and in NAD  HEENT: Pupils equal and  reactive bilaterally, No  blood present in bilateral nares, no hemotympanum, 3cm occipital laceration.  Chest: No gross deformity, Equal chest rise and breathe sounds bilaterally, no audible wheeze or stridor  CV: S1S2 present, no audible murmur  Abd: Soft, distended, No gross sign of trauma, non tender  Pelvis: Stable palpable Femoral artery bilateral  Perineum/Genitalia/Rectal: No traumatic injury  MSK: full ROM all extremities, Sensory intact, No gross deformities of the joint        I&O's Detail      Daily Height in cm: 160.02 (2024 14:51)        LABS:                        12.2   9.54  )-----------( 163      ( 2024 18:47 )             38.3     01-    139  |  106  |  24<H>  ----------------------------<  114<H>  4.0   |  32<H>  |  0.87    Ca    9.1      2024 18:47    TPro  7.5  /  Alb  3.5  /  TBili  0.5  /  DBili  x   /  AST  44<H>  /  ALT  45  /  AlkPhos  91  08    PT/INR - ( 2024 13:56 )   PT: 10.4 sec;   INR: 0.92 ratio         PTT - ( 2024 13:56 )  PTT:28.2 sec  Urinalysis Basic - ( 2024 06:00 )    Color: Yellow / Appearance: Cloudy / S.018 / pH: x  Gluc: x / Ketone: Negative mg/dL  / Bili: Negative / Urobili: 1.0 mg/dL   Blood: x / Protein: 30 mg/dL / Nitrite: Negative   Leuk Esterase: Negative / RBC: 113 /HPF / WBC 1 /HPF   Sq Epi: x / Non Sq Epi: 1 /HPF / Bacteria: Negative /HPF

## 2024-01-09 NOTE — DIETITIAN INITIAL EVALUATION ADULT - NSFNSPHYEXAMSKINFT_GEN_A_CORE
Samir score 13 - erythema   Pressure Injury 1: sacrum, Stage I  Pressure Injury 2: Bilateral:, heel, Stage I  Pressure Injury 3: sacral spine, Stage I  Wound: posterior head lac; L scapula ecchymosis

## 2024-01-09 NOTE — PROGRESS NOTE ADULT - SUBJECTIVE AND OBJECTIVE BOX
CC:    HPI:  88 year old Female with a past medical history of HTN not on any blood thinners or anti-platelet agents who presents after a fall at home. Patient states that she was in the bathroom when she felt unsteady and dizzy and fell onto the ground with no laceration.  At this time, the patient denies any headaches, nausea, vomiting. Trauma surgery consulted for mechanical fall resulting in Left frontal SAH     : no events over night HCT stable       PAST MEDICAL & SURGICAL HISTORY:  HTN (hypertension)      Hemorrhoids      Fecal impaction      MVP (mitral valve prolapse)      Dizziness      No significant past surgical history      No significant past surgical history          FAMILY HISTORY:  No pertinent family history in first degree relatives    No pertinent family history in first degree relatives        Social Hx:    Allergies    penicillins (Unknown)  Gantrisin (Unknown)    Intolerances    pork (Other)  sulfa drugs (Unknown)      Height (cm): 160 ( @ 14:51)  Weight (kg): 37.8 ( @ 18:16)  BMI (kg/m2): 14.8 ( @ 18:16)    ICU Vital Signs Last 24 Hrs  T(C): 36.8 (2024 08:00), Max: 37.2 (2024 20:00)  T(F): 98.2 (2024 08:00), Max: 98.9 (2024 20:00)  HR: 72 (2024 11:00) (52 - 72)  BP: 101/85 (2024 11:00) (101/85 - 202/78)  BP(mean): 91 (2024 11:00) (73 - 115)  ABP: --  ABP(mean): --  RR: 11 (2024 11:00) (10 - 23)  SpO2: 97% (2024 11:00) (94% - 100%)    O2 Parameters below as of 2024 08:00  Patient On (Oxygen Delivery Method): room air                I&O's Summary    2024 07:01  -  2024 07:00  --------------------------------------------------------  IN: 660 mL / OUT: 500 mL / NET: 160 mL                              12.7   8.92  )-----------( 150      ( 2024 05:42 )             39.0           138  |  106  |  22  ----------------------------<  103<H>  3.5   |  29  |  0.72    Ca    9.1      2024 05:42  Phos  3.0       Mg     2.2         TPro  6.7  /  Alb  3.1<L>  /  TBili  0.5  /  DBili  x   /  AST  31  /  ALT  34  /  AlkPhos  81                  Urinalysis Basic - ( 2024 06:00 )    Color: Yellow / Appearance: Cloudy / S.018 / pH: x  Gluc: x / Ketone: Negative mg/dL  / Bili: Negative / Urobili: 1.0 mg/dL   Blood: x / Protein: 30 mg/dL / Nitrite: Negative   Leuk Esterase: Negative / RBC: 113 /HPF / WBC 1 /HPF   Sq Epi: x / Non Sq Epi: 1 /HPF / Bacteria: Negative /HPF        MEDICATIONS  (STANDING):  chlorhexidine 4% Liquid 1 Application(s) Topical <User Schedule>  losartan 50 milliGRAM(s) Oral once  losartan 100 milliGRAM(s) Oral daily  mirtazapine 7.5 milliGRAM(s) Oral daily  OLANZapine Disintegrating Tablet 5 milliGRAM(s) Oral at bedtime  senna 2 Tablet(s) Oral at bedtime  sodium chloride 0.9%. 1000 milliLiter(s) (80 mL/Hr) IV Continuous <Continuous>    MEDICATIONS  (PRN):      DVT Prophylaxis: V    Advanced Directives:  Discussed with:    Visit Information:    ** Time is exclusive of billed procedures and/or teaching and/or routine family updates.

## 2024-01-09 NOTE — PROGRESS NOTE ADULT - SUBJECTIVE AND OBJECTIVE BOX
Patient is a 88y old  Female who presents with a chief complaint of fall at home    HPI: Kathy Abraham is an 88 year old Female with a past medical history of HTN not on any blood thinners or anti-platelet agents who presents after a fall at home. Patient states that she was in the bathroom when she felt unsteady and dizzy and fell onto the ground with no laceration.  At this time, the patient denies any headaches, nausea, vomiting. Neurosurgery consulted for CTH finding of left frontal SAH.     1/9: Patient was seen and examined at bedside. Reports still feeling anxious at this time but denies any headaches, nausea, vomiting. Reported feeling hungry. Refused laceration repair yesterday but states that she is amenable today.       Vital Signs Last 24 Hrs  T(C): 36.7 (09 Jan 2024 04:00), Max: 37.2 (08 Jan 2024 20:00)  T(F): 98.1 (09 Jan 2024 04:00), Max: 98.9 (08 Jan 2024 20:00)  HR: 61 (09 Jan 2024 09:15) (52 - 67)  BP: 169/84 (09 Jan 2024 09:15) (117/56 - 202/78)  BP(mean): 108 (09 Jan 2024 09:15) (73 - 115)  RR: 14 (09 Jan 2024 09:15) (10 - 23)  SpO2: 98% (09 Jan 2024 09:15) (94% - 100%)    Parameters below as of 09 Jan 2024 04:00  Patient On (Oxygen Delivery Method): room air        MEDICATIONS  (STANDING):  chlorhexidine 4% Liquid 1 Application(s) Topical <User Schedule>  losartan 50 milliGRAM(s) Oral once  losartan 100 milliGRAM(s) Oral daily  mirtazapine 7.5 milliGRAM(s) Oral daily  OLANZapine Disintegrating Tablet 5 milliGRAM(s) Oral at bedtime  senna 2 Tablet(s) Oral at bedtime  sodium chloride 0.9%. 1000 milliLiter(s) (80 mL/Hr) IV Continuous <Continuous>    MEDICATIONS  (PRN):      ROS: pertinent positives in HPI, all other ROS were reviewed and are negative     PHYSICAL EXAM:  Constitutional: awake and alert.  HEENT: PERRLA, EOMI,   Neck: Supple.  Respiratory: No respiratory distress  Gastrointestinal: soft, nontender  Extremities:  no edema  Musculoskeletal: no joint swelling/tenderness, no abnormal movements  Skin: laceration noted in the occiput    Neurological exam:  HF: A x O x 3. Appropriately interactive, normal affect. Speech fluent, No Aphasia or paraphasic errors. Naming /repetition intact   CN: SHASTA, EOMI, VFF, facial sensation normal, no NLFD, tongue midline, Palate moves equally, SCM equal bilaterally  Motor: No pronator drift, Strength 5/5 in all 4 ext, normal bulk and tone, no tremor, rigidity or bradykinesia.    Sens: Intact to light touch / PP/ VS/ JS    Reflexes: Symmetric and normal . BJ 2+, BR 2+, KJ 2+, AJ 2+, downgoing toes b/l  Coord:  No FNFA, dysmetria, ARTHUR intact   Gait/Balance: Cannot test            LABS:                         12.7   8.92  )-----------( 150      ( 09 Jan 2024 05:42 )             39.0     01-09    138  |  106  |  22  ----------------------------<  103<H>  3.5   |  29  |  0.72    Ca    9.1      09 Jan 2024 05:42  Phos  3.0     01-09  Mg     2.2     01-09    TPro  6.7  /  Alb  3.1<L>  /  TBili  0.5  /  DBili  x   /  AST  31  /  ALT  34  /  AlkPhos  81  01-09    LIVER FUNCTIONS - ( 09 Jan 2024 05:42 )  Alb: 3.1 g/dL / Pro: 6.7 gm/dL / ALK PHOS: 81 U/L / ALT: 34 U/L / AST: 31 U/L / GGT: x                 RADIOLOGY:

## 2024-01-09 NOTE — DIETITIAN INITIAL EVALUATION ADULT - PERTINENT LABORATORY DATA
01-09    138  |  106  |  22  ----------------------------<  103<H>  3.5   |  29  |  0.72    Ca    9.1      09 Jan 2024 05:42  Phos  3.0     01-09  Mg     2.2     01-09    TPro  6.7  /  Alb  3.1<L>  /  TBili  0.5  /  DBili  x   /  AST  31  /  ALT  34  /  AlkPhos  81  01-09

## 2024-01-09 NOTE — PHYSICAL THERAPY INITIAL EVALUATION ADULT - ADL SKILLS, REHAB EVAL
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 female  who presented to her primary care physician with constipation, rectal pain and a palpable mass.  Colonoscopy on 2019 revealed a large anal mass biopsy positive for invasive poorly differentiated squamous cell carcinoma.  2 benign polyps were removed at the time.  Per Dr. Wu colonoscopy report the patient had a very firm nodular submucosal lesion occupying the anterior rectum, the anal sphincter and extending to apparently the rectovaginal septum.  In the distal most descending colon at the hepatic flexure was a sessile polyp approximately 3 cm x 1 cm that was removed.  In the descending colon a diminutive polyp was removed.  CT of the chest was negative for metastatic disease.  She saw Dr. Deanne Jones who recommended definitive chemoradiotherapy.  The patient has had increasing anorectal pain and feels the urge to defecate.  She uses Phenergan for nausea and Norco for pain.  She has had a 16 to 18 pound weight loss.  Of note she has had a CVA in the past and has decreased range of motion of the right upper extremity.  Wears BiPAP and uses home O2.      Allergies   Allergen Reactions   • Daliresp [Roflumilast] Diarrhea     Significant diarrhea.        Social History     Tobacco Use   • Smoking status: Former Smoker     Packs/day: 1.50     Years: 30.00     Pack years: 45.00     Types: Electronic Cigarette     Last attempt to quit: 2015     Years since quittin.9   • Smokeless tobacco: Never Used   Substance Use Topics   • Alcohol use: No   • Drug use: No         Past Medical  "History:   Diagnosis Date   • Acid reflux disease    • Anal cancer (CMS/HCC) 12/5/2019   • Arthritis    • Asthma, extrinsic    • Cholelithiasis    • Chronic headaches    • COPD (chronic obstructive pulmonary disease) (CMS/HCC)    • CVA (cerebral vascular accident) (CMS/HCC)     w/ right sided deficit   • Diabetes mellitus (CMS/HCC)     only has high blood sugar when on steriods   • Hypertension    • Obstructive sleep apnea treated with BiPAP    • On home oxygen therapy     2L    • Sleep apnea, obstructive        family history includes Arrhythmia in her mother and sister; Coronary artery disease in an other family member; Diabetes in her mother; Heart attack in her brother; Hypertension in her mother; Lymphoma in her brother; Other in her brother; Pneumonia in her father.     Past Surgical History:   Procedure Laterality Date   • CARDIAC CATHETERIZATION     • CAROTID ENDARTERECTOMY Left 2018   • CHOLECYSTECTOMY WITH INTRAOPERATIVE CHOLANGIOGRAM N/A 1/30/2017    Procedure: CHOLECYSTECTOMY LAPAROSCOPIC INTRAOPERATIVE CHOLANGIOGRAM;  Surgeon: Carolin Marie MD;  Location: [facility];  Service:    • HYSTERECTOMY      for heavy bleeding/ complete   • KIDNEY STONE SURGERY     • TUBAL ABDOMINAL LIGATION          Review of Systems   Constitutional: Positive for fatigue.   Gastrointestinal: Positive for constipation and rectal pain.   Musculoskeletal: Positive for back pain.   All other systems reviewed and are negative.          Objective   VITAL SIGNS:   Vitals:    12/05/19 1113   BP: 118/74   Pulse: 111   Resp: 19   Temp: 97.4 °F (36.3 °C)   TempSrc: Temporal   SpO2: 91%  Comment: RA   Weight: 82.8 kg (182 lb 9.6 oz)   Height: 162.6 cm (64\")   PainSc:   3   PainLoc: Rectum        KPS       80%    Physical Exam   Constitutional: She is oriented to person, place, and time. She appears well-developed and well-nourished. No distress.   HENT:   Head: Normocephalic and atraumatic.   Eyes: EOM are normal. No scleral icterus. "   Neck: Neck supple.   Cardiovascular: Normal rate and regular rhythm.   Pulmonary/Chest: Effort normal and breath sounds normal.   Abdominal: Soft. Bowel sounds are normal. She exhibits no distension. There is no tenderness.   Lymphadenopathy:     She has no cervical adenopathy.   Neurological: She is alert and oriented to person, place, and time.   Nursing note and vitals reviewed.  Pelvic exam reveals the anal canal is nearly closed.  I cannot do a rectal exam.  There is no tumor extruding from the anus.  The vaginal exam revealed tumor involving the rectovaginal septum.  The mucosa was intact.  She has no inguinal adenopathy.         The following portions of the patient's history were reviewed and updated as appropriate: allergies, current medications, past family history, past medical history, past social history, past surgical history and problem list.    Assessment      IMPRESSION:    Liz Jiang  is a very pleasant 56 y.o. female  who presented to her primary care physician with constipation, rectal pain and a palpable mass.  Colonoscopy on 11/1/2019 revealed a large anal mass biopsy positive for invasive poorly differentiated squamous cell carcinoma.  2 benign polyps were removed at the time.  She saw Dr. Deanne Jones who recommended definitive chemoradiotherapy.  The patient has had increasing anorectal pain and feels the urge to defecate.  She uses Phenergan for nausea and Norco for pain.  She has had a 16 to 18 pound weight loss.  I reviewed the CT scans and MRI.  There are very small lymph nodes bilaterally but no enlarged nodes.  The anal mass was visualized.    RECOMMENDATIONS: I recommend definitive chemoradiotherapy.  She has locally advanced disease.  Ms. Jiang has been seen by gynecologist and had a recent Pap smear that she reports was negative.  The pros and cons, risks and benefits of combined chemotherapy and radiation were discussed with Ms. Jiang and informed consent obtained.   She understands that she can have moderate to severe radiation dermatitis across the perineum, bladder or bowel changes, fatigue or decreased blood counts.  Post therapy she will be given a vaginal dilator to use for the first year following treatment..   She sees Dr. Marie on December 9 for port placement.       Ramona Campos MD      Errors in dictation may reflect use of voice recognition software and not all errors in transcription may have been detected prior to signing.   HHA/needed assist

## 2024-01-09 NOTE — PROGRESS NOTE ADULT - ASSESSMENT
88F PMHx HTN presents to  ED s/p fall admitted to MICU with:  Assessment:  1. Traumatic SAH  2. Hypertension    Plan:  NEURO:   -Repeat CT head performed this evening with stable SAH. No acute neurosurgical intervention at this time.   -Monitor mental status closely, avoid neurosuppresants.   -Serial neurologic assessments, Q1H neuro checks.   -Remeron QD, Zyprexa QHS.     CV:  -Strict blood pressure control for goal SBP <160. Given Hydralazine 5mg IVPx1 this evening with adequate response. Atenolol QD. Will utilize Hydralazine/Labetalol IVP PRN vs. Cardene infusion if continues to be hypertensive this evening.   -Keep K~4 and Mg>2 for optimal arrhythmia suppression.  -Official TTE in AM. Troponin negative. Cardiology consultation for near-syncopal episode.     PULM:   -Satting well on RA, will utilize supplemental O2 PRN to maintain goal SpO2 >88%.   -Incentive spirometry, Chest PT/Pulmonary toilet, HOB >30'.     GI:   -NPO.     RENAL:   -Renal function currently WNL.  -trend lytes/Scr daily with BMP  -I's and O's, goal UOP 0.5 cc/kg/hr  -renal dose meds and avoid nephrotoxins   -Gentle mIVF with NS 80cc/h.     ENDO:   -Aggressive glycemic control to limit FS glucose to <180mg/dl.     ID:   -Afebrile, no concern for infectious process.     HEME:   -VTE ppx with SCDs.     GOC: Full Code.

## 2024-01-09 NOTE — PHYSICAL THERAPY INITIAL EVALUATION ADULT - PRECAUTIONS/LIMITATIONS, REHAB EVAL
chronic dizziness , poor PO intake /poor PO hydration h/o OP with multiple vertebral compression fxs T1,T3,t4 in past and SP fxs T3,T7,T8 ; h/o mass like lesion in ventral epidural space T3-T5 on prior imaging/fall precautions

## 2024-01-09 NOTE — DIETITIAN INITIAL EVALUATION ADULT - ADD RECOMMEND
1) c/w regular diet; Encourage protein-rich foods, maximize food preferences, 2) Add ensure plus high protein BID to optimize PO intake (provides 350 kcal, 20g protein/ shake), 3) MVI w/ minerals daily to ensure 100% RDA met, 4) Consider adding thiamine 100 mg daily 2/2 poor PO intake/ malnutrition, 5) Monitor bowel movements, if no BM for >3 days, consider implementing stronger bowel regimen, 6) Consider obtaining vitamin D 25OH level to assess nutriture, 7) consider checking B6, B12, thiamine, folate, carnitine, and copper levels as malnutrition in cause these to be deficient, 8) Monitor closely for refeeding syndrome - please obtain BMP, Mg, and Phos levels. Monitor and replete K, Phos, Mg prn if begins to downtrend. If nutrition support is initiated, recommend to check refeeding labs BID x 2-3 days upon initiation and then will reevaluate. Consider KPhos suppl BID in effort to supplement low lytes prior to initiating nutrition support, 9) Confirm goals of care regarding nutrition support. RD will continue to monitor PO intake, labs, hydration, and wt prn.

## 2024-01-09 NOTE — PHYSICAL THERAPY INITIAL EVALUATION ADULT - PERSONAL SAFETY AND JUDGMENT, REHAB EVAL
pt thinks B legs are swollen when they are visible cachectic ,all bony prominennces visible/palpable, talks about eating too much but ate minimal ,not drinking water unless heavily persuaded/at risk behaviors demonstrated

## 2024-01-09 NOTE — PHYSICAL THERAPY INITIAL EVALUATION ADULT - ACTIVE RANGE OF MOTION EXAMINATION, REHAB EVAL
jelani. upper extremity Active ROM was WNL (within normal limits)/bilateral lower extremity Active ROM was WNL (within normal limits)

## 2024-01-10 ENCOUNTER — TRANSCRIPTION ENCOUNTER (OUTPATIENT)
Age: 89
End: 2024-01-10

## 2024-01-10 VITALS
SYSTOLIC BLOOD PRESSURE: 176 MMHG | RESPIRATION RATE: 18 BRPM | HEART RATE: 79 BPM | OXYGEN SATURATION: 97 % | TEMPERATURE: 98 F | DIASTOLIC BLOOD PRESSURE: 65 MMHG

## 2024-01-10 LAB
ANION GAP SERPL CALC-SCNC: 5 MMOL/L — SIGNIFICANT CHANGE UP (ref 5–17)
ANION GAP SERPL CALC-SCNC: 5 MMOL/L — SIGNIFICANT CHANGE UP (ref 5–17)
BUN SERPL-MCNC: 16 MG/DL — SIGNIFICANT CHANGE UP (ref 7–23)
BUN SERPL-MCNC: 16 MG/DL — SIGNIFICANT CHANGE UP (ref 7–23)
CALCIUM SERPL-MCNC: 9.1 MG/DL — SIGNIFICANT CHANGE UP (ref 8.5–10.1)
CALCIUM SERPL-MCNC: 9.1 MG/DL — SIGNIFICANT CHANGE UP (ref 8.5–10.1)
CHLORIDE SERPL-SCNC: 110 MMOL/L — HIGH (ref 96–108)
CHLORIDE SERPL-SCNC: 110 MMOL/L — HIGH (ref 96–108)
CO2 SERPL-SCNC: 23 MMOL/L — SIGNIFICANT CHANGE UP (ref 22–31)
CO2 SERPL-SCNC: 23 MMOL/L — SIGNIFICANT CHANGE UP (ref 22–31)
CREAT SERPL-MCNC: 0.72 MG/DL — SIGNIFICANT CHANGE UP (ref 0.5–1.3)
CREAT SERPL-MCNC: 0.72 MG/DL — SIGNIFICANT CHANGE UP (ref 0.5–1.3)
EGFR: 81 ML/MIN/1.73M2 — SIGNIFICANT CHANGE UP
EGFR: 81 ML/MIN/1.73M2 — SIGNIFICANT CHANGE UP
GLUCOSE SERPL-MCNC: 89 MG/DL — SIGNIFICANT CHANGE UP (ref 70–99)
GLUCOSE SERPL-MCNC: 89 MG/DL — SIGNIFICANT CHANGE UP (ref 70–99)
HCT VFR BLD CALC: 44.4 % — SIGNIFICANT CHANGE UP (ref 34.5–45)
HCT VFR BLD CALC: 44.4 % — SIGNIFICANT CHANGE UP (ref 34.5–45)
HGB BLD-MCNC: 14.1 G/DL — SIGNIFICANT CHANGE UP (ref 11.5–15.5)
HGB BLD-MCNC: 14.1 G/DL — SIGNIFICANT CHANGE UP (ref 11.5–15.5)
MAGNESIUM SERPL-MCNC: 2.3 MG/DL — SIGNIFICANT CHANGE UP (ref 1.6–2.6)
MAGNESIUM SERPL-MCNC: 2.3 MG/DL — SIGNIFICANT CHANGE UP (ref 1.6–2.6)
MCHC RBC-ENTMCNC: 29.2 PG — SIGNIFICANT CHANGE UP (ref 27–34)
MCHC RBC-ENTMCNC: 29.2 PG — SIGNIFICANT CHANGE UP (ref 27–34)
MCHC RBC-ENTMCNC: 31.8 GM/DL — LOW (ref 32–36)
MCHC RBC-ENTMCNC: 31.8 GM/DL — LOW (ref 32–36)
MCV RBC AUTO: 91.9 FL — SIGNIFICANT CHANGE UP (ref 80–100)
MCV RBC AUTO: 91.9 FL — SIGNIFICANT CHANGE UP (ref 80–100)
PHOSPHATE SERPL-MCNC: 2 MG/DL — LOW (ref 2.5–4.5)
PHOSPHATE SERPL-MCNC: 2 MG/DL — LOW (ref 2.5–4.5)
PLATELET # BLD AUTO: 153 K/UL — SIGNIFICANT CHANGE UP (ref 150–400)
PLATELET # BLD AUTO: 153 K/UL — SIGNIFICANT CHANGE UP (ref 150–400)
POTASSIUM SERPL-MCNC: 3.4 MMOL/L — LOW (ref 3.5–5.3)
POTASSIUM SERPL-MCNC: 3.4 MMOL/L — LOW (ref 3.5–5.3)
POTASSIUM SERPL-SCNC: 3.4 MMOL/L — LOW (ref 3.5–5.3)
POTASSIUM SERPL-SCNC: 3.4 MMOL/L — LOW (ref 3.5–5.3)
RBC # BLD: 4.83 M/UL — SIGNIFICANT CHANGE UP (ref 3.8–5.2)
RBC # BLD: 4.83 M/UL — SIGNIFICANT CHANGE UP (ref 3.8–5.2)
RBC # FLD: 15.2 % — HIGH (ref 10.3–14.5)
RBC # FLD: 15.2 % — HIGH (ref 10.3–14.5)
SODIUM SERPL-SCNC: 138 MMOL/L — SIGNIFICANT CHANGE UP (ref 135–145)
SODIUM SERPL-SCNC: 138 MMOL/L — SIGNIFICANT CHANGE UP (ref 135–145)
T4 AB SER-ACNC: 6.9 UG/DL — SIGNIFICANT CHANGE UP (ref 4.6–12)
T4 AB SER-ACNC: 6.9 UG/DL — SIGNIFICANT CHANGE UP (ref 4.6–12)
WBC # BLD: 9.62 K/UL — SIGNIFICANT CHANGE UP (ref 3.8–10.5)
WBC # BLD: 9.62 K/UL — SIGNIFICANT CHANGE UP (ref 3.8–10.5)
WBC # FLD AUTO: 9.62 K/UL — SIGNIFICANT CHANGE UP (ref 3.8–10.5)
WBC # FLD AUTO: 9.62 K/UL — SIGNIFICANT CHANGE UP (ref 3.8–10.5)

## 2024-01-10 RX ORDER — SODIUM CHLORIDE 9 MG/ML
1000 INJECTION INTRAMUSCULAR; INTRAVENOUS; SUBCUTANEOUS ONCE
Refills: 0 | Status: DISCONTINUED | OUTPATIENT
Start: 2024-01-10 | End: 2024-01-10

## 2024-01-10 RX ORDER — ACETAMINOPHEN 500 MG
650 TABLET ORAL ONCE
Refills: 0 | Status: COMPLETED | OUTPATIENT
Start: 2024-01-10 | End: 2024-01-10

## 2024-01-10 RX ADMIN — Medication 650 MILLIGRAM(S): at 06:36

## 2024-01-10 RX ADMIN — LOSARTAN POTASSIUM 100 MILLIGRAM(S): 100 TABLET, FILM COATED ORAL at 06:37

## 2024-01-10 NOTE — PROGRESS NOTE ADULT - ASSESSMENT
88 F w/ left frontal SAH s/p mechanical fall at home not on any AC.    Plan:     f/u AM labs  Pending echo and cardio consult  home PT to be set up  regular diet  Pain meds PRN  CM for dispo planning      Plan discussed with Dr. Márquez

## 2024-01-10 NOTE — DISCHARGE NOTE PROVIDER - HOSPITAL COURSE
87yo F presented to the ED on 1/8/24 with intracranial bleeding s/p fall at home. The patient was found to have a left frontal subarachnoid hemorrhage which was followed and determined to be stable based on repeat imaging. The patient is stable for discharge.

## 2024-01-10 NOTE — CONSULT NOTE ADULT - ASSESSMENT
88 year old Female with a past medical history of HTN not on any blood thinners or anti-platelet agents who presents after a fall at home. Patient states that she was in the bathroom when she felt unsteady and dizzy and fell onto the ground with no laceration.  At this time, the patient denies any headaches, nausea, vomiting. Trauma surgery consulted for mechanical fall resulting in Left frontal SAH    1/10/24  Echocardiogram showed normal function  No rhythm abnormalities on Telemetry.  Symptoms more consistent with inner ear etiology.   will order additional IV fluids today

## 2024-01-10 NOTE — CONSULT NOTE ADULT - ASSESSMENT
88 year old Female who follows at [facility] with DR. Dao for h/o leukopenia and remote history of low grade MDS with a past medical history of HTN not on any blood thinners or anti-platelet agents who presents after a fall at home found to have a left frontal SAH.     # h/o leukopenia  - follows with Dr. 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 Dao for h/o leukopenia and remote history of low grade MDS with a past medical history of HTN not on any blood thinners or anti-platelet agents who presents after a fall at home found to have a left frontal SAH.     # h/o leukopenia  - follows with Dr. Dao at [facility]  - 6/2021  next generation sequencing was sent revealing TET2 [id number] alteration  - WBC 8.9, Hb 12.7, plt 150 - leukopenia has now resolved   - plan was for outpatient PET scan but pt refused as well as bone marrow biopsy  - CT chest with mild clustered nodular opacities in RLL suggesting small airways disease   - CT a/p negative for malignancy     # SAH  - repeat CTH stable SAH  - pt denies any symptoms at this time     will follow up with dr dao on dc

## 2024-01-10 NOTE — PROGRESS NOTE ADULT - SUBJECTIVE AND OBJECTIVE BOX
SURGERY DAILY PROGRESS NOTE:     Subjective:  Patient seen and examined at bedside during am rounds. Patient feels well. Tolerating diet. AVSS. Denies any fevers, chills, n/v/d, chest pain or shortness of breath    Objective:    MEDICATIONS  (STANDING):  chlorhexidine 4% Liquid 1 Application(s) Topical <User Schedule>  losartan 100 milliGRAM(s) Oral daily  mirtazapine 7.5 milliGRAM(s) Oral daily  OLANZapine Disintegrating Tablet 5 milliGRAM(s) Oral at bedtime  senna 2 Tablet(s) Oral at bedtime  sodium chloride 0.9%. 1000 milliLiter(s) (80 mL/Hr) IV Continuous <Continuous>    MEDICATIONS  (PRN):      Vital Signs Last 24 Hrs  T(C): 36 (2024 20:15), Max: 36.8 (2024 08:00)  T(F): 96.8 (2024 20:15), Max: 98.2 (2024 08:00)  HR: 67 (2024 20:15) (52 - 76)  BP: 135/59 (2024 20:15) (101/85 - 183/72)  BP(mean): 97 (2024 18:20) (77 - 108)  RR: 18 (2024 20:15) (11 - 24)  SpO2: 98% (2024 20:15) (94% - 100%)    Parameters below as of 2024 20:15  Patient On (Oxygen Delivery Method): room air          PHYSICAL EXAM   Gen: Awake, Alert and in NAD  HEENT: Pupils equal and  reactive bilaterally, No  blood present in bilateral nares, no hemotympanum  Chest: No gross deformity, Equal chest rise and breathe sounds bilaterally, no audible wheeze or stridor  CV: S1S2 present, no audible murmur  Abd: Soft, distended, No gross sign of trauma, non tender  Pelvis: Stable palpable Femoral artery bilateral  Perineum/Genitalia/Rectal: No traumatic injury  MSK: full ROM all extremities, Sensory intact, No gross deformities of the joint        I&O's Detail    2024 07:01  -  2024 07:00  --------------------------------------------------------  IN:    sodium chloride 0.9%: 660 mL  Total IN: 660 mL    OUT:    Voided (mL): 500 mL  Total OUT: 500 mL    Total NET: 160 mL      2024 07:01  -  10 [place] 2024 03:09  --------------------------------------------------------  IN:  Total IN: 0 mL    OUT:    Voided (mL): 400 mL  Total OUT: 400 mL    Total NET: -400 mL          Daily     Daily     LABS:                        12.7   8.92  )-----------( 150      ( 2024 05:42 )             39.0     -    138  |  106  |  22  ----------------------------<  103<H>  3.5   |  29  |  0.72    Ca    9.1      2024 05:42  Phos  3.0     -  Mg     2.2         TPro  6.7  /  Alb  3.1<L>  /  TBili  0.5  /  DBili  x   /  AST  31  /  ALT  34  /  AlkPhos  81  -09    PT/INR - ( 2024 13:56 )   PT: 10.4 sec;   INR: 0.92 ratio         PTT - ( 2024 13:56 )  PTT:28.2 sec  Urinalysis Basic - ( 2024 06:00 )    Color: Yellow / Appearance: Cloudy / S.018 / pH: x  Gluc: x / Ketone: Negative mg/dL  / Bili: Negative / Urobili: 1.0 mg/dL   Blood: x / Protein: 30 mg/dL / Nitrite: Negative   Leuk Esterase: Negative / RBC: 113 /HPF / WBC 1 /HPF   Sq Epi: x / Non Sq Epi: 1 /HPF / Bacteria: Negative /HPF         SURGERY DAILY PROGRESS NOTE:     Subjective:  Patient seen and examined at bedside during am rounds. Patient feels well. Tolerating diet. AVSS. Denies any fevers, chills, n/v/d, chest pain or shortness of breath    Objective:    MEDICATIONS  (STANDING):  chlorhexidine 4% Liquid 1 Application(s) Topical <User Schedule>  losartan 100 milliGRAM(s) Oral daily  mirtazapine 7.5 milliGRAM(s) Oral daily  OLANZapine Disintegrating Tablet 5 milliGRAM(s) Oral at bedtime  senna 2 Tablet(s) Oral at bedtime  sodium chloride 0.9%. 1000 milliLiter(s) (80 mL/Hr) IV Continuous <Continuous>    MEDICATIONS  (PRN):      Vital Signs Last 24 Hrs  T(C): 36 (2024 20:15), Max: 36.8 (2024 08:00)  T(F): 96.8 (2024 20:15), Max: 98.2 (2024 08:00)  HR: 67 (2024 20:15) (52 - 76)  BP: 135/59 (2024 20:15) (101/85 - 183/72)  BP(mean): 97 (2024 18:20) (77 - 108)  RR: 18 (2024 20:15) (11 - 24)  SpO2: 98% (2024 20:15) (94% - 100%)    Parameters below as of 2024 20:15  Patient On (Oxygen Delivery Method): room air          PHYSICAL EXAM   Gen: Awake, Alert and in NAD  HEENT: Pupils equal and  reactive bilaterally, No  blood present in bilateral nares, no hemotympanum  Chest: No gross deformity, Equal chest rise and breathe sounds bilaterally, no audible wheeze or stridor  CV: S1S2 present, no audible murmur  Abd: Soft, distended, No gross sign of trauma, non tender  Pelvis: Stable palpable Femoral artery bilateral  Perineum/Genitalia/Rectal: No traumatic injury  MSK: full ROM all extremities, Sensory intact, No gross deformities of the joint        I&O's Detail    2024 07:01  -  2024 07:00  --------------------------------------------------------  IN:    sodium chloride 0.9%: 660 mL  Total IN: 660 mL    OUT:    Voided (mL): 500 mL  Total OUT: 500 mL    Total NET: 160 mL      2024 07:01  -  10 [place] 2024 03:09  --------------------------------------------------------  IN:  Total IN: 0 mL    OUT:    Voided (mL): 400 mL  Total OUT: 400 mL    Total NET: -400 mL          Daily     Daily     LABS:                        12.7   8.92  )-----------( 150      ( 2024 05:42 )             39.0     -    138  |  106  |  22  ----------------------------<  103<H>  3.5   |  29  |  0.72    Ca    9.1      2024 05:42  Phos  3.0     -  Mg     2.2         TPro  6.7  /  Alb  3.1<L>  /  TBili  0.5  /  DBili  x   /  AST  31  /  ALT  34  /  AlkPhos  81  -09    PT/INR - ( 2024 13:56 )   PT: 10.4 sec;   INR: 0.92 ratio         PTT - ( 2024 13:56 )  PTT:28.2 sec  Urinalysis Basic - ( 2024 06:00 )    Color: Yellow / Appearance: Cloudy / S.018 / pH: x  Gluc: x / Ketone: Negative mg/dL  / Bili: Negative / Urobili: 1.0 mg/dL   Blood: x / Protein: 30 mg/dL / Nitrite: Negative   Leuk Esterase: Negative / RBC: 113 /HPF / WBC 1 /HPF   Sq Epi: x / Non Sq Epi: 1 /HPF / Bacteria: Negative /HPF

## 2024-01-10 NOTE — DISCHARGE NOTE PROVIDER - CARE PROVIDER_API CALL
Alpesh Mejia (DO)  Surgery  [address] [place] [address]  Phone: [phone]  Fax: [phone]  Follow Up Time: 2 weeks   Alpesh Mejia (DO)  Surgery  [address] [place] [id number]  Phone: [phone]  Fax: [phone]  Follow Up Time: 2 weeks

## 2024-01-10 NOTE — CONSULT NOTE ADULT - SUBJECTIVE AND OBJECTIVE BOX
HPI:  88 year old Female who follows at [facility] with DR. Dao for h/o leukopenia and remote history of low grade MDS with a past medical history of HTN not on any blood thinners or anti-platelet agents who presents after a fall at home found to have a left frontal SAH.     Patient states that she was in the bathroom when she felt unsteady and dizzy and fell onto the ground with no laceration.  At this time, the patient denies any headaches, nausea, vomiting. Trauma surgery consulted for mechanical fall resulting in Left frontal SAH     Pt has 24 hr HHA    CTH stable SAHlow     CT chest with mild clustered nodular opacities in RLL suggesting small airways disease     CT a/p negative    WBC 8.9, Hb 12.7, plt 150     PAST MEDICAL & SURGICAL HISTORY:  HTN (hypertension)      Hemorrhoids      Fecal impaction      MVP (mitral valve prolapse)      Dizziness      No significant past surgical history      No significant past surgical history          Allergies    penicillins (Unknown)  Gantrisin (Unknown)    Intolerances    pork (Other)  sulfa drugs (Unknown)      MEDICATIONS  (STANDING):  chlorhexidine 4% Liquid 1 Application(s) Topical <User Schedule>  losartan 100 milliGRAM(s) Oral daily  mirtazapine 7.5 milliGRAM(s) Oral daily  OLANZapine Disintegrating Tablet 5 milliGRAM(s) Oral at bedtime  senna 2 Tablet(s) Oral at bedtime  sodium chloride 0.9%. 1000 milliLiter(s) (80 mL/Hr) IV Continuous <Continuous>    MEDICATIONS  (PRN):      FAMILY HISTORY:  No pertinent family history in first degree relatives    No pertinent family history in first degree relatives        SOCIAL HISTORY: No EtOH, no tobacco    REVIEW OF SYSTEMS:    CONSTITUTIONAL + weakness/fatigue   EYES/ENT: No visual changes;  no HA  RESPIRATORY: No cough, wheezing, hemoptysis; No shortness of breath  CARDIOVASCULAR: No chest pain or palpitations  GASTROINTESTINAL: No abdominal or epigastric pain. No nausea, vomiting, or hematemesis; No diarrhea or constipation. No melena or hematochezia.  GENITOURINARY: No dysuria, frequency or hematuria  NEUROLOGICAL: No numbness or weakness, + joint pain   All other review of systems is negative unless indicated above.        T(F): 97.4 (01-10-24 @ 08:26), Max: 98.1 (01-09-24 @ 16:00)  HR: 64 (01-10-24 @ 08:26)  BP: 183/82 (01-10-24 @ 06:20)  RR: 18 (01-10-24 @ 08:26)  SpO2: 98% (01-10-24 @ 08:26)  Wt(kg): --    GENERAL: NAD, frail, cachectic   HEAD:  Atraumatic, Normocephalic  EYES: EOMI,  CHEST/LUNG: Clear to auscultation bilaterally; No wheeze  HEART: Regular rate and rhythm;  ABDOMEN: Soft, Nontender,   EXTREMITIES: no edema, +joint pain on movement   NEUROLOGY: non-focal  SKIN: No rashes or lesions                          14.1   9.62  )-----------( 153      ( 10 Gaetano 2024 07:29 )             44.4       01-10    138  |  110<H>  |  16  ----------------------------<  89  3.4<L>   |  23  |  0.72    Ca    9.1      10 Gaetano 2024 07:29  Phos  2.0     01-10  Mg     2.3     01-10    TPro  6.7  /  Alb  3.1<L>  /  TBili  0.5  /  DBili  x   /  AST  31  /  ALT  34  /  AlkPhos  81  01-09      Magnesium: 2.3 mg/dL (01-10 @ 07:29)  Phosphorus: 2.0 mg/dL (01-10 @ 07:29)      PT/INR - ( 08 Jan 2024 13:56 )   PT: 10.4 sec;   INR: 0.92 ratio         PTT - ( 08 Jan 2024 13:56 )  PTT:28.2 sec     HPI:  88 year old Female who follows at [facility] with DR. Dao for h/o leukopenia and remote history of low grade MDS with a past medical history of HTN not on any blood thinners or anti-platelet agents who presents after a fall at home found to have a left frontal SAH.     Patient states that she was in the bathroom when she felt unsteady and dizzy and fell onto the ground with no laceration.  At this time, the patient denies any headaches, nausea, vomiting. Trauma surgery consulted for mechanical fall resulting in Left frontal SAH     Pt has 24 hr HHA    CTH stable SAHlow     CT chest with mild clustered nodular opacities in RLL suggesting small airways disease     CT a/p negative    WBC 8.9, Hb 12.7, plt 150     PAST MEDICAL & SURGICAL HISTORY:  HTN (hypertension)      Hemorrhoids      Fecal impaction      MVP (mitral valve prolapse)      Dizziness      No significant past surgical history      No significant past surgical history          Allergies    penicillins (Unknown)  Gantrisin (Unknown)    Intolerances    pork (Other)  sulfa drugs (Unknown)      MEDICATIONS  (STANDING):  chlorhexidine 4% Liquid 1 Application(s) Topical <User Schedule>  losartan 100 milliGRAM(s) Oral daily  mirtazapine 7.5 milliGRAM(s) Oral daily  OLANZapine Disintegrating Tablet 5 milliGRAM(s) Oral at bedtime  senna 2 Tablet(s) Oral at bedtime  sodium chloride 0.9%. 1000 milliLiter(s) (80 mL/Hr) IV Continuous <Continuous>    MEDICATIONS  (PRN):      FAMILY HISTORY:  No pertinent family history in first degree relatives    No pertinent family history in first degree relatives        SOCIAL HISTORY: No EtOH, no tobacco    REVIEW OF SYSTEMS:    CONSTITUTIONAL + weakness/fatigue   EYES/ENT: No visual changes;  no HA  RESPIRATORY: No cough, wheezing, hemoptysis; No shortness of breath  CARDIOVASCULAR: No chest pain or palpitations  GASTROINTESTINAL: No abdominal or epigastric pain. No nausea, vomiting, or hematemesis; No diarrhea or constipation. No melena or hematochezia.  GENITOURINARY: No dysuria, frequency or hematuria  NEUROLOGICAL: No numbness or weakness, + joint pain   All other review of systems is negative unless indicated above.        T(F): 97.4 (01-10-24 @ 08:26), Max: 98.1 (01-09-24 @ 16:00)  HR: 64 (01-10-24 @ 08:26)  BP: 183/82 (01-10-24 @ 06:20)  RR: 18 (01-10-24 @ 08:26)  SpO2: 98% (01-10-24 @ 08:26)  Wt(kg): --    GENERAL: NAD, frail, cachectic   HEAD:  Atraumatic, Normocephalic  EYES: EOMI,  CHEST/LUNG: Clear to auscultation bilaterally; No wheeze  HEART: Regular rate and rhythm;  ABDOMEN: Soft, Nontender,   EXTREMITIES: no edema, +joint pain on movement   NEUROLOGY: non-focal  SKIN: No rashes or lesions                          14.1   9.62  )-----------( 153      ( 10 Gaetano 2024 07:29 )             44.4       01-10    138  |  110<H>  |  16  ----------------------------<  89  3.4<L>   |  23  |  0.72    Ca    9.1      10 Gaetano 2024 07:29  Phos  2.0     01-10  Mg     2.3     01-10    TPro  6.7  /  Alb  3.1<L>  /  TBili  0.5  /  DBili  x   /  AST  31  /  ALT  34  /  AlkPhos  81  01-09      Magnesium: 2.3 mg/dL (01-10 @ 07:29)  Phosphorus: 2.0 mg/dL (01-10 @ 07:29)      PT/INR - ( 08 Jan 2024 13:56 )   PT: 10.4 sec;   INR: 0.92 ratio         PTT - ( 08 Jan 2024 13:56 )  PTT:28.2 sec

## 2024-01-10 NOTE — DISCHARGE NOTE NURSING/CASE MANAGEMENT/SOCIAL WORK - PATIENT PORTAL LINK FT
You can access the FollowMyHealth Patient Portal offered by [facility] by registering at the following website: [email or web address]. By joining [facility]’s FollowMyHealth portal, you will also be able to view your health information using other applications (apps) compatible with our system. You can access the FollowMyHealth Patient Portal offered by [facility] by registering at the following website: [email or web address]. By joining [facility]’s FollowMyHealth portal, you will also be able to view your health information using other applications (apps) compatible with our system.

## 2024-01-10 NOTE — DISCHARGE NOTE NURSING/CASE MANAGEMENT/SOCIAL WORK - NSDCPEFALRISK_GEN_ALL_CORE
For information on Fall & Injury Prevention, visit: [email or web address] OR  [email or web address] OR  https://www.cdc.gov/steadi/patient.html For information on Fall & Injury Prevention, visit: [email or web address] OR  [email or web address] OR  https://www.cdc.gov/steadi/patient.html

## 2024-01-10 NOTE — DISCHARGE NOTE PROVIDER - NSDCMRMEDTOKEN_GEN_ALL_CORE_FT
atenolol 25 mg oral tablet: 1 tab(s) orally once a day as needed for BP&gt;150  Colace 100 mg oral capsule: 2 cap(s) orally once a day with lunch  irbesartan 150 mg oral tablet: 1 tab(s) orally once a day  Linzess 72 mcg oral capsule: 1 cap(s) orally once a day  magnesium oxide 250 mg oral tablet: 1 tab(s) orally once a day  mirtazapine 7.5 mg oral tablet: 1 tab(s) orally once a day (at bedtime)  OLANZapine 5 mg oral tablet, disintegratin tab(s) orally once a day (at bedtime)

## 2024-01-10 NOTE — DISCHARGE NOTE PROVIDER - CARE PROVIDERS DIRECT ADDRESSES
[email or web address]

## 2024-01-10 NOTE — DISCHARGE NOTE PROVIDER - NSDCCPCAREPLAN_GEN_ALL_CORE_FT
PRINCIPAL DISCHARGE DIAGNOSIS  Diagnosis: Traumatic subarachnoid hemorrhage without loss of consciousness  Assessment and Plan of Treatment:

## 2024-01-10 NOTE — CONSULT NOTE ADULT - SUBJECTIVE AND OBJECTIVE BOX
88 year old Female with a past medical history of HTN not on any blood thinners or anti-platelet agents who presents after a fall at home. Patient states that she was in the bathroom when she felt unsteady and dizzy and fell onto the ground with no laceration.  At this time, the patient denies any headaches, nausea, vomiting. Trauma surgery consulted for mechanical fall resulting in Left frontal SAH (08 Jan 2024 18:19)    1/10/24  Patient states that she was in the bathroom and felt the room spinning and then she fell. No fogginess in the head.   Family is concerned that she is dehydrated.   she is complaining of shortness of breath.  No abnormalities on telemetry monitor    CARDIAC STUDIES:  < from: TTE Echo Complete w/o Contrast w/ Doppler (01.09.24 @ 13:12) >   Estimated left ventricular ejection fractionis 60-65 %.   The left ventricle is normal in size, wall motion and contractility as   seen in limited views.   Mild concentric left ventricular hypertrophy is present.   Mild to Moderate aortic regurgitation.   Mild (1+) mitral regurgitation.   Moderate (2+) tricuspid valve regurgitation.   Mild pulmonary hypertension.   Moderate pulmonic valvular regurgitation    < end of copied text >        PAST MEDICAL & SURGICAL HISTORY:  HTN (hypertension)      Hemorrhoids      Fecal impaction      MVP (mitral valve prolapse)      Dizziness      No significant past surgical history      No significant past surgical history        MEDICATIONS  (STANDING):  chlorhexidine 4% Liquid 1 Application(s) Topical <User Schedule>  losartan 100 milliGRAM(s) Oral daily  mirtazapine 7.5 milliGRAM(s) Oral daily  OLANZapine Disintegrating Tablet 5 milliGRAM(s) Oral at bedtime  senna 2 Tablet(s) Oral at bedtime  sodium chloride 0.9%. 1000 milliLiter(s) (80 mL/Hr) IV Continuous <Continuous>    MEDICATIONS  (PRN):    Allergies    penicillins (Unknown)  Gantrisin (Unknown)    Intolerances    pork (Other)  sulfa drugs (Unknown)    FAMILY HISTORY:  No pertinent family history in first degree relatives    No pertinent family history in first degree relatives          REVIEW OF SYSTEMS:    CONSTITUTIONAL: No weakness, fevers or chills  EYES/ENT: No visual changes;  No vertigo or throat pain   NECK: No pain or stiffness  RESPIRATORY: No cough, wheezing, hemoptysis; No shortness of breath  CARDIOVASCULAR: No chest pain or palpitations  GASTROINTESTINAL: No abdominal or epigastric pain. No nausea, vomiting, or hematemesis; No diarrhea or constipation. No melena or hematochezia.  GENITOURINARY: No dysuria, frequency or hematuria  NEUROLOGICAL: No numbness or weakness  SKIN: No itching, burning, rashes, or lesions   All other review of systems is negative unless indicated above      PHYSICAL EXAM:  Daily     Daily   Vital Signs Last 24 Hrs  T(C): 36.3 (10 Gaetano 2024 08:26), Max: 36.7 (09 Jan 2024 12:00)  T(F): 97.4 (10 Gaetano 2024 08:26), Max: 98.1 (09 Jan 2024 16:00)  HR: 64 (10 Gaetano 2024 08:26) (57 - 76)  BP: 183/82 (10 Gaetano 2024 06:20) (101/85 - 183/82)  BP(mean): 106 (10 Gaetano 2024 06:20) (78 - 108)  RR: 18 (10 Gaetano 2024 08:26) (11 - 24)  SpO2: 98% (10 Gaetano 2024 08:26) (94% - 98%)    Parameters below as of 10 Gaetano 2024 08:26  Patient On (Oxygen Delivery Method): room air        Constitutional: NAD, awake and alert, well-developed  HEENT: PERR, EOMI, Normal Hearing, MMM  Neck: Soft and supple, No LAD, No JVD  Respiratory: Breath sounds are clear bilaterally, No wheezing, rales or rhonchi  Cardiovascular: S1 and S2, regular rate and rhythm, no Murmurs, gallops or rubs  Gastrointestinal: Bowel Sounds present, soft, nontender, nondistended, no guarding, no rebound  Extremities: No peripheral edema  Vascular: 2+ peripheral pulses  Neurological: A/O x 3, no focal deficits  Musculoskeletal: 5/5 strength b/l upper and lower extremities  Skin: No rashes    LABS: All Labs Reviewed:                        14.1   9.62  )-----------( 153      ( 10 Gaetano 2024 07:29 )             44.4     01-10    138  |  110<H>  |  16  ----------------------------<  89  3.4<L>   |  23  |  0.72    Ca    9.1      10 Gaetano 2024 07:29  Phos  2.0     01-10  Mg     2.3     01-10    TPro  6.7  /  Alb  3.1<L>  /  TBili  0.5  /  DBili  x   /  AST  31  /  ALT  34  /  AlkPhos  81  01-09    PT/INR - ( 08 Jan 2024 13:56 )   PT: 10.4 sec;   INR: 0.92 ratio         PTT - ( 08 Jan 2024 13:56 )  PTT:28.2 sec      Blood Culture:     RADIOLOGY: < from: CT Abdomen and Pelvis No Cont (01.08.24 @ 19:43) >  ACC: [id number] EXAM:  CT ABDOMEN AND PELVIS   ORDERED BY: OLIVA MEJIA     ACC: [id number] EXAM:  CT CHEST   ORDERED BY: OLIVA MEJIA     PROCEDURE DATE:  01/08/2024          INTERPRETATION:  CLINICAL INFORMATION: Trauma. Status post fall.    COMPARISON: 1/8/2024 at 3:54 PM    PROCEDURE:  CT of the Chest, Abdomen and Pelvis was performed without intravenous   contrast.  Intravenous contrast: None.  Oral contrast: None.  Sagittal and coronal reformats were performed.    FINDINGS:    CHEST:    LUNGS ANDLARGE AIRWAYS: Patent central airways. Subsegmental atelectasis   at the lung bases. Mild clustered nodular opacities in the right lower   lobe, suggesting small airways disease. Stable 3 mm left lower lobe   pulmonary nodule.  PLEURA: No pleural effusion.  VESSELS: Within normal limits.  HEART: Heart size is normal. No pericardial effusion.  MEDIASTINUM AND VERN: No lymphadenopathy.  CHEST WALL AND LOWER NECK: Within normal limits.    ABDOMEN AND PELVIS:    LIVER: Within normal limits.  BILE DUCTS: Normal caliber.  GALLBLADDER: Within normal limits.  SPLEEN: Within normal limits.  PANCREAS: Stable subcentimeter lesion in the head of the pancreas.  ADRENALS: Within normal limits.  KIDNEYS/URETERS: Bilateral nonobstructing renal stones, largest measuring   4 mm in the right lower pole. Small left renal angiomyolipoma..    BLADDER: Within normal limits.  REPRODUCTIVE ORGANS: Uterus and bilateral adnexa within normal limits.    BOWEL: No bowel obstruction. Appendix is not visualized. Stool throughout   the colon.  PERITONEUM: No ascites.  VESSELS:  Atherosclerotic calcifications.  RETROPERITONEUM: No lymphadenopathy.  ABDOMINAL WALL: Within normal limits.  BONES: Osteopenia and degenerative change. Thoracic kyphosis. Anterior   plate fixation of the sternum. Bilateral old rib fractures.    IMPRESSION:  No evidence of acute traumatic abnormality in the chest, abdomen, or   pelvis.    < end of copied text >    EKG: Diagnosis Line Sinus bradycardia  Left axis deviation  Nonspecific ST and T wave abnormality  Abnormal ECG  When compared with ECG of 08-JAN-2024 17:04,  No significant change was found       88 year old Female with a past medical history of HTN not on any blood thinners or anti-platelet agents who presents after a fall at home. Patient states that she was in the bathroom when she felt unsteady and dizzy and fell onto the ground with no laceration.  At this time, the patient denies any headaches, nausea, vomiting. Trauma surgery consulted for mechanical fall resulting in Left frontal SAH (08 Jan 2024 18:19)    1/10/24  Patient states that she was in the bathroom and felt the room spinning and then she fell. No fogginess in the head.   Family is concerned that she is dehydrated.   she is complaining of shortness of breath.  No abnormalities on telemetry monitor    CARDIAC STUDIES:  < from: TTE Echo Complete w/o Contrast w/ Doppler (01.09.24 @ 13:12) >   Estimated left ventricular ejection fractionis 60-65 %.   The left ventricle is normal in size, wall motion and contractility as   seen in limited views.   Mild concentric left ventricular hypertrophy is present.   Mild to Moderate aortic regurgitation.   Mild (1+) mitral regurgitation.   Moderate (2+) tricuspid valve regurgitation.   Mild pulmonary hypertension.   Moderate pulmonic valvular regurgitation    < end of copied text >        PAST MEDICAL & SURGICAL HISTORY:  HTN (hypertension)      Hemorrhoids      Fecal impaction      MVP (mitral valve prolapse)      Dizziness      No significant past surgical history      No significant past surgical history        MEDICATIONS  (STANDING):  chlorhexidine 4% Liquid 1 Application(s) Topical <User Schedule>  losartan 100 milliGRAM(s) Oral daily  mirtazapine 7.5 milliGRAM(s) Oral daily  OLANZapine Disintegrating Tablet 5 milliGRAM(s) Oral at bedtime  senna 2 Tablet(s) Oral at bedtime  sodium chloride 0.9%. 1000 milliLiter(s) (80 mL/Hr) IV Continuous <Continuous>    MEDICATIONS  (PRN):    Allergies    penicillins (Unknown)  Gantrisin (Unknown)    Intolerances    pork (Other)  sulfa drugs (Unknown)    FAMILY HISTORY:  No pertinent family history in first degree relatives    No pertinent family history in first degree relatives          REVIEW OF SYSTEMS:    CONSTITUTIONAL: No weakness, fevers or chills  EYES/ENT: No visual changes;  No vertigo or throat pain   NECK: No pain or stiffness  RESPIRATORY: No cough, wheezing, hemoptysis; No shortness of breath  CARDIOVASCULAR: No chest pain or palpitations  GASTROINTESTINAL: No abdominal or epigastric pain. No nausea, vomiting, or hematemesis; No diarrhea or constipation. No melena or hematochezia.  GENITOURINARY: No dysuria, frequency or hematuria  NEUROLOGICAL: No numbness or weakness  SKIN: No itching, burning, rashes, or lesions   All other review of systems is negative unless indicated above      PHYSICAL EXAM:  Daily     Daily   Vital Signs Last 24 Hrs  T(C): 36.3 (10 Gaetano 2024 08:26), Max: 36.7 (09 Jan 2024 12:00)  T(F): 97.4 (10 Gaetano 2024 08:26), Max: 98.1 (09 Jan 2024 16:00)  HR: 64 (10 Gaetano 2024 08:26) (57 - 76)  BP: 183/82 (10 Gaetano 2024 06:20) (101/85 - 183/82)  BP(mean): 106 (10 Gaetano 2024 06:20) (78 - 108)  RR: 18 (10 Gaetano 2024 08:26) (11 - 24)  SpO2: 98% (10 Gaetano 2024 08:26) (94% - 98%)    Parameters below as of 10 Gaetano 2024 08:26  Patient On (Oxygen Delivery Method): room air        Constitutional: NAD, awake and alert, well-developed  HEENT: PERR, EOMI, Normal Hearing, MMM  Neck: Soft and supple, No LAD, No JVD  Respiratory: Breath sounds are clear bilaterally, No wheezing, rales or rhonchi  Cardiovascular: S1 and S2, regular rate and rhythm, no Murmurs, gallops or rubs  Gastrointestinal: Bowel Sounds present, soft, nontender, nondistended, no guarding, no rebound  Extremities: No peripheral edema  Vascular: 2+ peripheral pulses  Neurological: A/O x 3, no focal deficits  Musculoskeletal: 5/5 strength b/l upper and lower extremities  Skin: No rashes    LABS: All Labs Reviewed:                        14.1   9.62  )-----------( 153      ( 10 Gaetano 2024 07:29 )             44.4     01-10    138  |  110<H>  |  16  ----------------------------<  89  3.4<L>   |  23  |  0.72    Ca    9.1      10 Gaetano 2024 07:29  Phos  2.0     01-10  Mg     2.3     01-10    TPro  6.7  /  Alb  3.1<L>  /  TBili  0.5  /  DBili  x   /  AST  31  /  ALT  34  /  AlkPhos  81  01-09    PT/INR - ( 08 Jan 2024 13:56 )   PT: 10.4 sec;   INR: 0.92 ratio         PTT - ( 08 Jan 2024 13:56 )  PTT:28.2 sec      Blood Culture:     RADIOLOGY: < from: CT Abdomen and Pelvis No Cont (01.08.24 @ 19:43) >  ACC: [id number] EXAM:  CT ABDOMEN AND PELVIS   ORDERED BY: OLIVA MEJIA     ACC: [id number] EXAM:  CT CHEST   ORDERED BY: OLIVA MEJIA     PROCEDURE DATE:  01/08/2024          INTERPRETATION:  CLINICAL INFORMATION: Trauma. Status post fall.    COMPARISON: 1/8/2024 at 3:54 PM    PROCEDURE:  CT of the Chest, Abdomen and Pelvis was performed without intravenous   contrast.  Intravenous contrast: None.  Oral contrast: None.  Sagittal and coronal reformats were performed.    FINDINGS:    CHEST:    LUNGS ANDLARGE AIRWAYS: Patent central airways. Subsegmental atelectasis   at the lung bases. Mild clustered nodular opacities in the right lower   lobe, suggesting small airways disease. Stable 3 mm left lower lobe   pulmonary nodule.  PLEURA: No pleural effusion.  VESSELS: Within normal limits.  HEART: Heart size is normal. No pericardial effusion.  MEDIASTINUM AND VERN: No lymphadenopathy.  CHEST WALL AND LOWER NECK: Within normal limits.    ABDOMEN AND PELVIS:    LIVER: Within normal limits.  BILE DUCTS: Normal caliber.  GALLBLADDER: Within normal limits.  SPLEEN: Within normal limits.  PANCREAS: Stable subcentimeter lesion in the head of the pancreas.  ADRENALS: Within normal limits.  KIDNEYS/URETERS: Bilateral nonobstructing renal stones, largest measuring   4 mm in the right lower pole. Small left renal angiomyolipoma..    BLADDER: Within normal limits.  REPRODUCTIVE ORGANS: Uterus and bilateral adnexa within normal limits.    BOWEL: No bowel obstruction. Appendix is not visualized. Stool throughout   the colon.  PERITONEUM: No ascites.  VESSELS:  Atherosclerotic calcifications.  RETROPERITONEUM: No lymphadenopathy.  ABDOMINAL WALL: Within normal limits.  BONES: Osteopenia and degenerative change. Thoracic kyphosis. Anterior   plate fixation of the sternum. Bilateral old rib fractures.    IMPRESSION:  No evidence of acute traumatic abnormality in the chest, abdomen, or   pelvis.    < end of copied text >    EKG: Diagnosis Line Sinus bradycardia  Left axis deviation  Nonspecific ST and T wave abnormality  Abnormal ECG  When compared with ECG of 08-JAN-2024 17:04,  No significant change was found

## 2024-01-10 NOTE — PROGRESS NOTE ADULT - NUTRITIONAL ASSESSMENT
This patient has been assessed with a concern for Malnutrition and has been determined to have a diagnosis/diagnoses of Severe protein-calorie malnutrition and Underweight (BMI < 19).    This patient is being managed with:   Diet Regular-  Entered: Jan 9 2024  9:04AM

## 2024-01-10 NOTE — DISCHARGE NOTE NURSING/CASE MANAGEMENT/SOCIAL WORK - NSDCVIVACCINE_GEN_ALL_CORE_FT
Tdap; 14-Oct-2020 13:08; Dunphy, Rebecca M (RN); [employer]; [id number] (Exp. Date: 20-Jun-2022); IntraMuscular; Deltoid Left.; 0.5 milliLiter(s); VIS (VIS Published: 09-May-2013, VIS Presented: 14-Oct-2020);   Tdap; 08-Jan-2024 16:24; Elisa Lara (PRIYA); [employer]; [id number] (Exp. Date: 23-Nov-2025); IntraMuscular; Deltoid Left.; 0.5 milliLiter(s); VIS (VIS Published: 09-May-2013, VIS Presented: 08-Jan-2024);    Tdap; 14-Oct-2020 13:08; Dunphy, Rebecca M (RN); [employer]; [id number] (Exp. Date: 20-Jun-2022); IntraMuscular; Deltoid Left.; 0.5 milliLiter(s); VIS (VIS Published: 09-May-2013, VIS Presented: 14-Oct-2020);   Tdap; 08-Jan-2024 16:24; Elisa Lara (PRIYA); [employer]; [id number] (Exp. Date: 23-Nov-2025); IntraMuscular; Deltoid Left.; 0.5 milliLiter(s); VIS (VIS Published: 09-May-2013, VIS Presented: 08-Jan-2024);

## 2024-01-16 DIAGNOSIS — Z88.0 ALLERGY STATUS TO PENICILLIN: ICD-10-CM
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
## 2024-01-16 DIAGNOSIS — I10 ESSENTIAL (PRIMARY) HYPERTENSION: ICD-10-CM

## 2024-01-16 DIAGNOSIS — E43 UNSPECIFIED SEVERE PROTEIN-CALORIE MALNUTRITION: ICD-10-CM
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
## 2024-01-16 DIAGNOSIS — S06.6X0A TRAUMATIC SUBARACHNOID HEMORRHAGE WITHOUT LOSS OF CONSCIOUSNESS, INITIAL ENCOUNTER: ICD-10-CM

## 2024-01-16 DIAGNOSIS — Z88.8 ALLERGY STATUS TO OTHER DRUGS, MEDICAMENTS AND BIOLOGICAL SUBSTANCES: ICD-10-CM

## 2024-01-16 DIAGNOSIS — Z88.2 ALLERGY STATUS TO SULFONAMIDES: ICD-10-CM

## 2024-01-16 DIAGNOSIS — Z91.014 ALLERGY TO MAMMALIAN MEATS: ICD-10-CM

## 2024-01-24 ENCOUNTER — APPOINTMENT (OUTPATIENT)
Dept: SURGERY | Facility: CLINIC | Age: 89
End: 2024-01-24
Payer: MEDICARE

## 2024-01-24 VITALS
BODY MASS INDEX: 13.16 KG/M2 | TEMPERATURE: 97.3 F | HEIGHT: 65 IN | HEART RATE: 65 BPM | DIASTOLIC BLOOD PRESSURE: 72 MMHG | SYSTOLIC BLOOD PRESSURE: 90 MMHG | WEIGHT: 79 LBS | OXYGEN SATURATION: 98 %

## 2024-01-24 DIAGNOSIS — S00.03XS CONTUSION OF SCALP, SEQUELA: ICD-10-CM

## 2024-01-24 DIAGNOSIS — S06.6XAA TRAUMATIC SUBARACHNOID HEMORRHAGE WITH LOSS OF CONSCIOUSNESS STATUS UNKNOWN, INITIAL ENCOUNTER: ICD-10-CM
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
## 2024-01-24 PROCEDURE — 99212 OFFICE O/P EST SF 10 MIN: CPT

## 2024-01-24 NOTE — REASON FOR VISIT
[Follow-Up] : a follow-up visit [Formal Caregiver] : formal caregiver [FreeTextEntry1] : fall, s/p closed head injury

## 2024-01-24 NOTE — PHYSICAL EXAM
[Normal Breath Sounds] : Normal breath sounds [Normal Heart Sounds] : normal heart sounds [JVD] : no jugular venous distention  [Abdomen Tenderness] : ~T ~M No abdominal tenderness [de-identified] : NAD [de-identified] : scalp healed

## 2024-04-23 ENCOUNTER — EMERGENCY (EMERGENCY)
Facility: HOSPITAL | Age: 89
LOS: 0 days | Discharge: LEFT BEFORE DISCHARGE | End: 2024-04-23
Attending: STUDENT IN AN ORGANIZED HEALTH CARE EDUCATION/TRAINING PROGRAM
Payer: MEDICARE

## 2024-04-23 VITALS
DIASTOLIC BLOOD PRESSURE: 66 MMHG | HEART RATE: 66 BPM | TEMPERATURE: 98 F | SYSTOLIC BLOOD PRESSURE: 131 MMHG | OXYGEN SATURATION: 98 % | RESPIRATION RATE: 18 BRPM

## 2024-04-23 VITALS — HEIGHT: 69 IN

## 2024-04-23 DIAGNOSIS — Z53.29 PROCEDURE AND TREATMENT NOT CARRIED OUT BECAUSE OF PATIENT'S DECISION FOR OTHER REASONS: ICD-10-CM

## 2024-04-23 DIAGNOSIS — Z88.6 ALLERGY STATUS TO ANALGESIC AGENT: ICD-10-CM

## 2024-04-23 DIAGNOSIS — R07.81 PLEURODYNIA: ICD-10-CM

## 2024-04-23 DIAGNOSIS — I10 ESSENTIAL (PRIMARY) HYPERTENSION: ICD-10-CM
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
## 2024-04-23 DIAGNOSIS — Z88.0 ALLERGY STATUS TO PENICILLIN: ICD-10-CM

## 2024-04-23 DIAGNOSIS — S22.42XA MULTIPLE FRACTURES OF RIBS, LEFT SIDE, INITIAL ENCOUNTER FOR CLOSED FRACTURE: ICD-10-CM
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
## 2024-04-23 LAB
ALBUMIN SERPL ELPH-MCNC: 3.4 G/DL — SIGNIFICANT CHANGE UP (ref 3.3–5)
ALP SERPL-CCNC: 113 U/L — SIGNIFICANT CHANGE UP (ref 40–120)
ALT FLD-CCNC: 20 U/L — SIGNIFICANT CHANGE UP (ref 12–78)
ANION GAP SERPL CALC-SCNC: 4 MMOL/L — LOW (ref 5–17)
AST SERPL-CCNC: 23 U/L — SIGNIFICANT CHANGE UP (ref 15–37)
BASOPHILS # BLD AUTO: 0.01 K/UL — SIGNIFICANT CHANGE UP (ref 0–0.2)
BASOPHILS NFR BLD AUTO: 0.1 % — SIGNIFICANT CHANGE UP (ref 0–2)
BILIRUB SERPL-MCNC: 0.4 MG/DL — SIGNIFICANT CHANGE UP (ref 0.2–1.2)
BLD GP AB SCN SERPL QL: SIGNIFICANT CHANGE UP
BUN SERPL-MCNC: 29 MG/DL — HIGH (ref 7–23)
CALCIUM SERPL-MCNC: 9.8 MG/DL — SIGNIFICANT CHANGE UP (ref 8.5–10.1)
CHLORIDE SERPL-SCNC: 103 MMOL/L — SIGNIFICANT CHANGE UP (ref 96–108)
CO2 SERPL-SCNC: 29 MMOL/L — SIGNIFICANT CHANGE UP (ref 22–31)
CREAT SERPL-MCNC: 0.74 MG/DL — SIGNIFICANT CHANGE UP (ref 0.5–1.3)
EGFR: 78 ML/MIN/1.73M2 — SIGNIFICANT CHANGE UP
EOSINOPHIL # BLD AUTO: 0.02 K/UL — SIGNIFICANT CHANGE UP (ref 0–0.5)
EOSINOPHIL NFR BLD AUTO: 0.2 % — SIGNIFICANT CHANGE UP (ref 0–6)
GLUCOSE SERPL-MCNC: 98 MG/DL — SIGNIFICANT CHANGE UP (ref 70–99)
HCT VFR BLD CALC: 40.4 % — SIGNIFICANT CHANGE UP (ref 34.5–45)
HGB BLD-MCNC: 12.5 G/DL — SIGNIFICANT CHANGE UP (ref 11.5–15.5)
IMM GRANULOCYTES NFR BLD AUTO: 2.5 % — HIGH (ref 0–0.9)
LYMPHOCYTES # BLD AUTO: 1 K/UL — SIGNIFICANT CHANGE UP (ref 1–3.3)
LYMPHOCYTES # BLD AUTO: 10.8 % — LOW (ref 13–44)
MANUAL SMEAR VERIFICATION: SIGNIFICANT CHANGE UP
MCHC RBC-ENTMCNC: 28.5 PG — SIGNIFICANT CHANGE UP (ref 27–34)
MCHC RBC-ENTMCNC: 30.9 GM/DL — LOW (ref 32–36)
MCV RBC AUTO: 92 FL — SIGNIFICANT CHANGE UP (ref 80–100)
MONOCYTES # BLD AUTO: 0.88 K/UL — SIGNIFICANT CHANGE UP (ref 0–0.9)
MONOCYTES NFR BLD AUTO: 9.5 % — SIGNIFICANT CHANGE UP (ref 2–14)
NEUTROPHILS # BLD AUTO: 7.14 K/UL — SIGNIFICANT CHANGE UP (ref 1.8–7.4)
NEUTROPHILS NFR BLD AUTO: 76.9 % — SIGNIFICANT CHANGE UP (ref 43–77)
PLAT MORPH BLD: NORMAL — SIGNIFICANT CHANGE UP
PLATELET # BLD AUTO: 173 K/UL — SIGNIFICANT CHANGE UP (ref 150–400)
PLATELET COUNT - ESTIMATE: NORMAL — SIGNIFICANT CHANGE UP
POTASSIUM SERPL-MCNC: 4.1 MMOL/L — SIGNIFICANT CHANGE UP (ref 3.5–5.3)
POTASSIUM SERPL-SCNC: 4.1 MMOL/L — SIGNIFICANT CHANGE UP (ref 3.5–5.3)
PROT SERPL-MCNC: 7.4 GM/DL — SIGNIFICANT CHANGE UP (ref 6–8.3)
RBC # BLD: 4.39 M/UL — SIGNIFICANT CHANGE UP (ref 3.8–5.2)
RBC # FLD: 14.8 % — HIGH (ref 10.3–14.5)
RBC BLD AUTO: NORMAL — SIGNIFICANT CHANGE UP
SODIUM SERPL-SCNC: 136 MMOL/L — SIGNIFICANT CHANGE UP (ref 135–145)
TROPONIN I, HIGH SENSITIVITY RESULT: 8.16 NG/L — SIGNIFICANT CHANGE UP
WBC # BLD: 9.28 K/UL — SIGNIFICANT CHANGE UP (ref 3.8–10.5)
WBC # FLD AUTO: 9.28 K/UL — SIGNIFICANT CHANGE UP (ref 3.8–10.5)

## 2024-04-23 PROCEDURE — 72125 CT NECK SPINE W/O DYE: CPT | Mod: 26,MC

## 2024-04-23 PROCEDURE — 93010 ELECTROCARDIOGRAM REPORT: CPT

## 2024-04-23 PROCEDURE — 71250 CT THORAX DX C-: CPT | Mod: 26,MC

## 2024-04-23 PROCEDURE — 84484 ASSAY OF TROPONIN QUANT: CPT

## 2024-04-23 PROCEDURE — 71250 CT THORAX DX C-: CPT | Mod: MC

## 2024-04-23 PROCEDURE — 71045 X-RAY EXAM CHEST 1 VIEW: CPT | Mod: 26

## 2024-04-23 PROCEDURE — 99285 EMERGENCY DEPT VISIT HI MDM: CPT

## 2024-04-23 PROCEDURE — 72125 CT NECK SPINE W/O DYE: CPT | Mod: MC

## 2024-04-23 PROCEDURE — 70450 CT HEAD/BRAIN W/O DYE: CPT | Mod: 26,MC

## 2024-04-23 PROCEDURE — 71045 X-RAY EXAM CHEST 1 VIEW: CPT

## 2024-04-23 PROCEDURE — 93005 ELECTROCARDIOGRAM TRACING: CPT

## 2024-04-23 PROCEDURE — 85025 COMPLETE CBC W/AUTO DIFF WBC: CPT

## 2024-04-23 PROCEDURE — 86850 RBC ANTIBODY SCREEN: CPT

## 2024-04-23 PROCEDURE — 99235 HOSP IP/OBS SAME DATE MOD 70: CPT

## 2024-04-23 PROCEDURE — 74176 CT ABD & PELVIS W/O CONTRAST: CPT | Mod: MC

## 2024-04-23 PROCEDURE — 99285 EMERGENCY DEPT VISIT HI MDM: CPT | Mod: 25

## 2024-04-23 PROCEDURE — 86901 BLOOD TYPING SEROLOGIC RH(D): CPT

## 2024-04-23 PROCEDURE — 96374 THER/PROPH/DIAG INJ IV PUSH: CPT

## 2024-04-23 PROCEDURE — 80053 COMPREHEN METABOLIC PANEL: CPT

## 2024-04-23 PROCEDURE — 86900 BLOOD TYPING SEROLOGIC ABO: CPT

## 2024-04-23 PROCEDURE — 70450 CT HEAD/BRAIN W/O DYE: CPT | Mod: MC

## 2024-04-23 PROCEDURE — 36415 COLL VENOUS BLD VENIPUNCTURE: CPT

## 2024-04-23 PROCEDURE — 74176 CT ABD & PELVIS W/O CONTRAST: CPT | Mod: 26,MC

## 2024-04-23 PROCEDURE — 96375 TX/PRO/DX INJ NEW DRUG ADDON: CPT

## 2024-04-23 RX ORDER — ATENOLOL 25 MG/1
25 TABLET ORAL ONCE
Refills: 0 | Status: COMPLETED | OUTPATIENT
Start: 2024-04-23 | End: 2024-04-23

## 2024-04-23 RX ORDER — HYDRALAZINE HCL 50 MG
10 TABLET ORAL ONCE
Refills: 0 | Status: COMPLETED | OUTPATIENT
Start: 2024-04-23 | End: 2024-04-23

## 2024-04-23 RX ORDER — CEFTRIAXONE 500 MG/1
1000 INJECTION, POWDER, FOR SOLUTION INTRAMUSCULAR; INTRAVENOUS ONCE
Refills: 0 | Status: COMPLETED | OUTPATIENT
Start: 2024-04-23 | End: 2024-04-23

## 2024-04-23 RX ORDER — AZITHROMYCIN 500 MG/1
500 TABLET, FILM COATED ORAL ONCE
Refills: 0 | Status: COMPLETED | OUTPATIENT
Start: 2024-04-23 | End: 2024-04-23

## 2024-04-23 RX ORDER — CEFTRIAXONE 500 MG/1
1000 INJECTION, POWDER, FOR SOLUTION INTRAMUSCULAR; INTRAVENOUS ONCE
Refills: 0 | Status: DISCONTINUED | OUTPATIENT
Start: 2024-04-23 | End: 2024-04-23

## 2024-04-23 RX ADMIN — ATENOLOL 25 MILLIGRAM(S): 25 TABLET ORAL at 19:05

## 2024-04-23 RX ADMIN — Medication 10 MILLIGRAM(S): at 17:48

## 2024-04-23 RX ADMIN — CEFTRIAXONE 1000 MILLIGRAM(S): 500 INJECTION, POWDER, FOR SOLUTION INTRAMUSCULAR; INTRAVENOUS at 18:51

## 2024-04-23 RX ADMIN — AZITHROMYCIN 255 MILLIGRAM(S): 500 TABLET, FILM COATED ORAL at 19:06

## 2024-04-23 NOTE — ED STATDOCS - PROGRESS NOTE DETAILS
89-year-old female presents the emergency department status post fall.  Patient lost her balance and fell on Sunday went to her primary care doctor yesterday for left-sided chest pain was found to have 3 rib fractures pain is getting worse here patient with tenderness to palpation of the left flank left ribs with significant discomfort with palpation Dex José M.D., Attending Physician

## 2024-04-23 NOTE — ED PROVIDER NOTE - NSFOLLOWUPINSTRUCTIONS_ED_ALL_ED_FT
you left the hospital AGAINST MEDICAL ADVICE.        Please take antibiotics twice a day for possible pneumonia.  Please follow-up with your primary care doctor for this.  Also follow with a pulmonologist.    Follow-up with your primary care doctor for your high blood pressure.    Follow-up with the thoracic surgeon for your rib fractures.    Seek immediate medical assistance for any new or worsening symptoms. If you have issues obtaining follow up, please call: [phone] or [phone]  to obtain a doctor or specialist who takes your insurance in your area.

## 2024-04-23 NOTE — CONSULT NOTE ADULT - NS ATTEND AMEND GEN_ALL_CORE FT
Attending A/P:    Chart reviewed, patient examined, agree with above resident evaluation in addition to the following    fall ~10days ago and presented to ER for persistent pain  Injuries: left 8th rib buckled fracture and L 6,7,9 chronic fractures    hypertensive to 200s in ER  no other traumatic injuries    RECS:   medical evaluation for hypertension to 200s  thoracic surgery consult for buckled rib fractures  no intervention from trauma perspective  tertiary by trauma team in AM      Pt will be monitored for signs of evolution/resolution of pathology and surgical intervention as required and warranted  Pt aware of and agrees with all of the above    75 minuted of time spent on pt examination, review of relevant labs and radiologic studies, assured stabilization of pt, discussion with relevant services/providers for coordination of pt care and services

## 2024-04-23 NOTE — ED PROVIDER NOTE - OBJECTIVE STATEMENT
89-year-old female past medical history of hypertension, traumatic subarachnoid hemorrhage January 2024 presents today with left-sided flank pain status post fall approximately 1 week ago.  Patient states that she slipped and fell on the tile, did not hit her head, was seen and evaluated by her primary care doctor yesterday who told her that she has 3 broken ribs on the left side, advised her to come to the emergency department for further evaluation.  Patient reports that she is been taking Tylenol last dose was this morning for her pain.  Denies difficulty breathing, nausea vomiting abdominal pain, urinary symptoms, diarrhea complexly stools, headache, vision change, numbness tingling weakness.  Patient reports has been using her walker at home to ambulate.

## 2024-04-23 NOTE — ED PROVIDER NOTE - PATIENT PORTAL LINK FT
You can access the FollowMyHealth Patient Portal offered by [facility] by registering at the following website: [email or web address]. By joining [facility]’s FollowMyHealth portal, you will also be able to view your health information using other applications (apps) compatible with our system.

## 2024-04-23 NOTE — ED ADULT NURSE REASSESSMENT NOTE - NS ED NURSE REASSESS COMMENT FT1
one IV attempted and failed. pt and son decline to have blood work/IV placed. Dr Myers notified, to order non contrast scans.

## 2024-04-23 NOTE — CONSULT NOTE ADULT - ASSESSMENT
89y/o F with PMHx of traumatic subarachnoid hemorrhage January 2024, HTN who presents to  ED today with left-sided flank pain status post fall on 4/21/2024 . Patient accompanied by chaperone (24hr support aid) and conflicting story's regarding her fall. Per pt, she reports slipping and falling in her kitchen (tile floor) and denies HS or LOC. Per support aid, she reports pt was in her bedroom when she fell and denies HS or LOC. Ultimately, pt decided to seek evaluation by her PCP yesturday and informed she had multiple rib fx, advised to come to ED for evaluation.     #Mechanical Fall  #Rib Fractures  #Uncontrolled HTN    Plan:  Fall/rib fx over 48hrs period  Rec thoracic consult for evaluation and potential need for surgical interventions.  Rec admitting to Medicine for BP mgmt and global optimization.  -TTE 1/2024:  EF 60-65 %. Mild concentric left ventricular hypertrophy, Mild to Moderate aortic regurgitation, Mild (1+) mitral regurgitation, Moderate (2+) tricuspid valve regurgitation, Mild pulmonary hypertension, Moderate pulmonic valvular regurgitation  Continuous hemodynamic monitoring  Analgesic mgmt to prohibit splinting  IS and deep breathing exercise  PT eval  NOT on AC. Resume home medications as appropriate.    Plan discussed with Trauma attending.

## 2024-04-23 NOTE — ED ADULT NURSE NOTE - NSFALLHARMRISKINTERV_ED_ALL_ED
Assistance OOB with selected safe patient handling equipment if applicable/Assistance with ambulation/Communicate risk of Fall with Harm to all staff, patient, and family/Monitor gait and stability/Monitor for mental status changes and reorient to person, place, and time, as needed/Move patient closer to nursing station/within visual sight of ED staff/Provide patient with walking aids/Provide visual cue: red socks, yellow wristband, yellow gown, etc/Reinforce activity limits and safety measures with patient and family/Toileting schedule using arm’s reach rule for commode and bathroom/Use of alarms - bed, stretcher, chair and/or video monitoring/Bed in lowest position, wheels locked, appropriate side rails in place/Call bell, personal items and telephone in reach/Instruct patient to call for assistance before getting out of bed/chair/stretcher/Non-slip footwear applied when patient is off stretcher/[place] to call system/Physically safe environment - no spills, clutter or unnecessary equipment/Purposeful Proactive Rounding/Room/bathroom lighting operational, light cord in reach

## 2024-04-23 NOTE — ED ADULT NURSE NOTE - CHIEF COMPLAINT QUOTE
Pt presents to the ED c/o left rib pain radiating to her back s/p fall on Sunday. Pt saw Dr. Gibbons yesterday and was diagnosed with 3 rib fractures. Pt took Tylenol at 9am with minimal relief.

## 2024-04-23 NOTE — ED ADULT NURSE NOTE - NS ED PATIENT SAFETY CONCERN
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 I reviewed the radiological findings, pathology and staging with her and the family.\par I reviewed the natural history of  Her-2 positive breast cancer and how it differs from other types of breast cancer.\par She is an appropriate candidate for neoadjuvant chemotherapy and her-2 targeted therapy. We discussed the goal of such treatment is to reduce the size of her breast and axillary disease and give her an opportunity to get a breast conserving surgery and also hopefully avoid axillary LN dissection.\par Achievement of PCR or not will also help guide post surgical treatment and prognosis\par We discussed the treatment options in detail and how dual anti Her2 targeted therapy will be incorporated in the treatment plan.\par  I informed her that in most instances a response is obtained with such treatment and one may also obtain a pathologic CR. However in a minority of pts, there may not be a desired optimal outcome, thus necessitating a mastectomy. \par \par We discussed chemotherapy plus dual HER2_targeted therapy with trastuzumab and pertuzumab. \par The anti Her2 therapy is usually combined with chemotherapy either anthracycline based (AC-THP) or non anthracycline based (Taxotere,Carboplatin, Herceptin and Perjeta)\par The differences in the side effect profile and efficacy between ACTHP and TCHP were discussed.\par Pt prefers AC-THP, where adriamycin and cytoxan will be administered every 2 weeks X 4 with Neulasta support followed by taxol weekly x 12 with herceptin and perjeta every 3 weeks . \par I discussed  side effects which include but are not limited to nausea, vomiting, hair loss, bone marrow suppression, cytopenia, increased risk of infection, sepsis, diarrhea, fatigue, allergic reaction and peripheral neuropathy.   Other side effects such as cardiac toxicity,bone marrow injury leading to MDS/AML, were also discussed, however these risks are very small. Overall the risk benefit ratio favors treatment with chemotherapy and Her2 targeted therapy.\par The need to closely monitor cardiac function every three months was impressed. A MUGA scan was ordered.\par \par  It would also be preferable that she undergo a Port_a_Cath placement for administration of chemotherapy. \par I explained to her that I will be clinically monitoring treatment response by physical exams at every visit.. At the completion of preop chemotherapy she will need to repeat mammogram, USG, MRI, PET scan prior to going ahead with surgery.\par \par We also discussed the role of post operative RT and the need to take hormonal therapy after completion of radiation.\par Herceptin and Perjeta will continue as adjuvant treatment for an additional 13 treatments, However if there is residual disease Kadcyla will be preferred .\par \par The patient and her family had several  questions which were discussed to their satisfaction. \par Prior to starting treatment she should undergo labs, PET scan, MRI breasts and ECHO.\par I also ordered genetic testing.\par Follow up in 7-10 days.\par Pt is also considering a second opinion at [facility], she was encouraged to proceed with that\par  Unable to assess due to medical condition

## 2024-04-23 NOTE — ED ADULT NURSE NOTE - OBJECTIVE STATEMENT
c/o rib pain and back pain after a fall on sunday. patient is asking repetitive questions and keeps asking son to go home.

## 2024-04-23 NOTE — ED PROVIDER NOTE - PROGRESS NOTE DETAILS
CT imaging significant for rib fracture left 6-8. Case discussed with Dr. Márquez trauma surgeon who will see and evaluate pt at bedside Sign out given to Dr. Bradley pending final trauma recommendations. The patient does not want stay in the hospital.  She currently has no symptoms.  Denies chest pain shortness of breath abdominal pain headache.  She has a chronic cough.  She has chronically high blood pressure.  Blood pressure  is 200s over 80s but she is unsure if she took her home BP meds today.  Will give a dose of hydralazine here and atenolol.  The blood pressure is now 180 over 70s.  She feels well.  She refuses stay in the hospital.  Surgical said   Admit to medicine for thoracic evaluation.  Surgery does not want to clear her  For discharge.  surgery wants medical admission for optimization for blood pressure and Thoracics eval.   I explained to the patient that there are risks with not following these recommendations and patient understands risks, Including death, and still wants to go home.  She is leaving against medical advice.  we will send antibiotics to her pharmacy for possible pneumonia picked up on CAT scan.  Will give her information for Thoracics as an outpatient.

## 2024-04-23 NOTE — ED ADULT NURSE NOTE - SUICIDE SCREENING QUESTION 2
-- DO NOT REPLY / DO NOT REPLY ALL --  -- Message is from the [facility]--    Provider paged via [employer] Documentation - The below message was copied and pasted from a [employer] page:    Patient called stated that their blood sugar is low. He is requesting a call back from the doctor. Declined to speak to a nurse.       Patient unable to complete

## 2024-04-23 NOTE — CONSULT NOTE ADULT - SUBJECTIVE AND OBJECTIVE BOX
CC:Patient is a 89y old  Female who presents with a chief complaint of     Time Notified: 1520   Time Seen: 1555  Subjective:   88y/o F with PMHx of traumatic subarachnoid hemorrhage January 2024, HTN who presents to  ED today with left-sided flank pain status post fall on 4/21/2024 . Patient accompanied by chaperone (24hr support aid) and conflicting story's regarding her fall. Per pt, she reports slipping and falling in her kitchen (tile floor) and denies HS or LOC. Per support aid, she reports pt was in her bedroom when she fell and denies HS or LOC. Ultimately, pt decided to seek evaluation by her PCP yesturday and informed she had multiple rib fx, advised to come to ED for evaluation.     Pt seen and examined at bedside with chaperone. Pt is AAOx3, pt in no acute distress. Not to be hypertensive on examination (SBP >200). Pt denied c/o fever, chills, chest pain, SOB, abd pain, N/V/D, extremity pain or dysfunction, hemoptysis, hematemesis, hematuria, hematochezia headache, vertigo, dizziness     ROS: as above otherwise negative    PMH: 1/2024 SAH, Prior falls, HTN  PSH: No significant past surgical history       penicillins (Unknown)  sulfa drugs (Unknown)  Gantrisin (Unknown)  pork (Other)    SH: Support aid 24hrs  FH: No pertinent family history in first degree relatives    Vital Signs Last 24 Hrs  T(C): 36.5 (23 Apr 2024 15:15), Max: 36.5 (23 Apr 2024 15:15)  T(F): 97.7 (23 Apr 2024 15:15), Max: 97.7 (23 Apr 2024 15:15)  HR: 57 (23 Apr 2024 15:15) (57 - 60)  BP: 202/89 (23 Apr 2024 15:15) (184/73 - 202/89)  BP(mean): 123 (23 Apr 2024 15:15) (106 - 123)  RR: 18 (23 Apr 2024 15:15) (18 - 18)  SpO2: 98% (23 Apr 2024 15:15) (98% - 100%)    Parameters below as of 23 Apr 2024 15:15  Patient On (Oxygen Delivery Method): room air        Labs:                          12.5   9.28  )-----------( 173      ( 23 Apr 2024 15:48 )             40.4     CBC Full  -  ( 23 Apr 2024 15:48 )  WBC Count : 9.28 K/uL  RBC Count : 4.39 M/uL  Hemoglobin : 12.5 g/dL  Hematocrit : 40.4 %  Platelet Count - Automated : 173 K/uL  Mean Cell Volume : 92.0 fl  Mean Cell Hemoglobin : 28.5 pg  Mean Cell Hemoglobin Concentration : 30.9 gm/dL  Auto Neutrophil # : 7.14 K/uL  Auto Lymphocyte # : 1.00 K/uL  Auto Monocyte # : 0.88 K/uL  Auto Eosinophil # : 0.02 K/uL  Auto Basophil # : 0.01 K/uL  Auto Neutrophil % : 76.9 %  Auto Lymphocyte % : 10.8 %  Auto Monocyte % : 9.5 %  Auto Eosinophil % : 0.2 %  Auto Basophil % : 0.1 %    04-23    136  |  103  |  29<H>  ----------------------------<  98  4.1   |  29  |  0.74    Ca    9.8      23 Apr 2024 15:48    TPro  7.4  /  Alb  3.4  /  TBili  0.4  /  DBili  x   /  AST  23  /  ALT  20  /  AlkPhos  113  04-23    LIVER FUNCTIONS - ( 23 Apr 2024 15:48 )  Alb: 3.4 g/dL / Pro: 7.4 gm/dL / ALK PHOS: 113 U/L / ALT: 20 U/L / AST: 23 U/L / GGT: x                 Meds:  hydrALAZINE Injectable 10 milliGRAM(s) IV Push Once      Radiology:  < from: CT Head No Cont (04.23.24 @ 13:20) >    IMPRESSIONS:    Head CT: No CT evidence of acute intracranial hemorrhage.    C-spine CT:  No acute fracture.  Old appearing anterior wedge defect at T1.  Suspected bone island in the left C3 facet.  Lucencies in the right skull base again noted. Additional evaluation may   be indicated.    --- End of Report ---    < end of copied text >  < from: CT Chest No Cont (04.23.24 @ 13:20) >  IMPRESSION:  Acute buckling of the left posterior eighth rib. Acute or subacute   fractures left posterior sixth and seventh ribs also noted. Sclerosis of   the left lateral ninth rib is new compared with prior CT 1/8/2024, likely   subacute or chronic fracture. Small left pleural effusion. No   pneumothorax.    Heterogeneous sclerosis of the sacrum, new compared with prior CT,   favored to represent age indeterminate and likely subacute fracture.    Tree-in-bud nodularity in the right middle lobe possibly infectious or   inflammatory in etiology.        --- End of Report ---    < end of copied text >          PRIMARY SURVEY:   A - airway intact  B - bilateral breath sounds and good chest rise  C - initial BP stable , palpable pulses in all extremities  D - GCS 15 on arrival. E 4, V 5, M 6.   Exposure obtained    SECONDARY SURVEY:  Physical exam:  GCS of 15  Airway is patent  Breathing is symmetric and unlabored  Neuro: CNII-XII grossly intact  Psych: normal affect  HEENT: Normocephalic, atraumatic, DALE, EOM wnl, no otorrhea b/l, no epistaxis or d/c b/l nares, no craniofacial bony pathology or tenderness b/l  Neck:  No crepitus, no ecchymosis, no hematoma, to exam, no JVD, no tracheal deviation  Cspine/thoracolumbrosacral spine: no gross bony pathology or tenderness to exam  Cardiovascular: S1S2 Present  Chest: Tenderness to palpitation over Lt posterior thoracic region. No gross rib pathology or tenderness to exam. No sternal pathology or tenderness to exam. No crepitus, no ecchymosis, no hematoma. No penetrating thorcoabdominal trauma  Respiratory:  Respiratory Effort normal; no wheezes, rales or rhonchi to exam  ABD: bowel sounds (+), soft, nontender, non distended, no rebound, no guarding, no rigidity, no skin changes to exam. No pelvic instability to exam, no skin changes  Musculoskeletal: Pt has palpable b/l radial, femoral, dorsalis pedis pulses. All digits are warm and well perfused. No gross long bone pathology or tenderness to exam. Pt demonstrates grossly intact sensoromotor function. Pt has good capillary refill to digits, no calf edema or tenderness to exam.  Skin: no lesions or rashes to exam       CC:Patient is a 89y old  Female who presents with a chief complaint of     Time Notified: 1520   Time Seen: 1555  Subjective:   88y/o F with PMHx of traumatic subarachnoid hemorrhage January 2024, HTN who presents to  ED today with left-sided flank pain status post fall on 4/21/2024 . Patient accompanied by chaperone (24hr support aid) and her son who mentioned that aid does not pay attention to the patient and she has been having frequent fall. Per pt, she reports slipping and falling in her kitchen (tile floor) ~10days ago and denies HS or LOC. Ultimately, pt decided to seek evaluation by her PCP yesterday and informed she had multiple rib fx, advised to come to ED for evaluation. Per son she was complaining of persistent pain and so decided to come to ER    . Pt is AAOx3, pt in no acute distress. Not to be hypertensive on examination (SBP >200). Pt denied c/o fever, chills, chest pain, SOB, abd pain, N/V/D, extremity pain or dysfunction, hemoptysis, hematemesis, hematuria, hematochezia headache, vertigo, dizziness     ROS: as above otherwise negative    PMH: 1/2024 SAH, Prior falls, HTN  PSH: No significant past surgical history       penicillins (Unknown)  sulfa drugs (Unknown)  Gantrisin (Unknown)  pork (Other)    SH: Support aid 24hrs, denies smoking, alcohol, illicit drugs  FH: No pertinent family history in first degree relatives    Vital Signs Last 24 Hrs  T(C): 36.5 (23 Apr 2024 15:15), Max: 36.5 (23 Apr 2024 15:15)  T(F): 97.7 (23 Apr 2024 15:15), Max: 97.7 (23 Apr 2024 15:15)  HR: 57 (23 Apr 2024 15:15) (57 - 60)  BP: 202/89 (23 Apr 2024 15:15) (184/73 - 202/89)  BP(mean): 123 (23 Apr 2024 15:15) (106 - 123)  RR: 18 (23 Apr 2024 15:15) (18 - 18)  SpO2: 98% (23 Apr 2024 15:15) (98% - 100%)    Parameters below as of 23 Apr 2024 15:15  Patient On (Oxygen Delivery Method): room air        Labs:                          12.5   9.28  )-----------( 173      ( 23 Apr 2024 15:48 )             40.4     CBC Full  -  ( 23 Apr 2024 15:48 )  WBC Count : 9.28 K/uL  RBC Count : 4.39 M/uL  Hemoglobin : 12.5 g/dL  Hematocrit : 40.4 %  Platelet Count - Automated : 173 K/uL  Mean Cell Volume : 92.0 fl  Mean Cell Hemoglobin : 28.5 pg  Mean Cell Hemoglobin Concentration : 30.9 gm/dL  Auto Neutrophil # : 7.14 K/uL  Auto Lymphocyte # : 1.00 K/uL  Auto Monocyte # : 0.88 K/uL  Auto Eosinophil # : 0.02 K/uL  Auto Basophil # : 0.01 K/uL  Auto Neutrophil % : 76.9 %  Auto Lymphocyte % : 10.8 %  Auto Monocyte % : 9.5 %  Auto Eosinophil % : 0.2 %  Auto Basophil % : 0.1 %    04-23    136  |  103  |  29<H>  ----------------------------<  98  4.1   |  29  |  0.74    Ca    9.8      23 Apr 2024 15:48    TPro  7.4  /  Alb  3.4  /  TBili  0.4  /  DBili  x   /  AST  23  /  ALT  20  /  AlkPhos  113  04-23    LIVER FUNCTIONS - ( 23 Apr 2024 15:48 )  Alb: 3.4 g/dL / Pro: 7.4 gm/dL / ALK PHOS: 113 U/L / ALT: 20 U/L / AST: 23 U/L / GGT: x                 Meds:  hydrALAZINE Injectable 10 milliGRAM(s) IV Push Once      Radiology:  < from: CT Head No Cont (04.23.24 @ 13:20) >    IMPRESSIONS:    Head CT: No CT evidence of acute intracranial hemorrhage.    C-spine CT:  No acute fracture.  Old appearing anterior wedge defect at T1.  Suspected bone island in the left C3 facet.  Lucencies in the right skull base again noted. Additional evaluation may   be indicated.    --- End of Report ---    < end of copied text >  < from: CT Chest No Cont (04.23.24 @ 13:20) >  IMPRESSION:  Acute buckling of the left posterior eighth rib. Acute or subacute   fractures left posterior sixth and seventh ribs also noted. Sclerosis of   the left lateral ninth rib is new compared with prior CT 1/8/2024, likely   subacute or chronic fracture. Small left pleural effusion. No   pneumothorax.    Heterogeneous sclerosis of the sacrum, new compared with prior CT,   favored to represent age indeterminate and likely subacute fracture.    Tree-in-bud nodularity in the right middle lobe possibly infectious or   inflammatory in etiology.        --- End of Report ---    < end of copied text >          PRIMARY SURVEY:   A - airway intact  B - bilateral breath sounds and good chest rise  C - initial BP stable , palpable pulses in all extremities  D - GCS 15 on arrival. E 4, V 5, M 6.   Exposure obtained    SECONDARY SURVEY:  Physical exam:  GCS of 15  Airway is patent  Breathing is symmetric and unlabored  Neuro: CNII-XII grossly intact, grossly no focal  Psych: normal affect  HEENT: Normocephalic, atraumatic, DALE, EOM wnl, no otorrhea b/l, no epistaxis or d/c b/l nares, no craniofacial bony pathology or tenderness b/l  Neck:  No crepitus, no ecchymosis, no hematoma, to exam, no JVD, no tracheal deviation, no c-T-l-S spine tenderness, no step offs  Cspine/thoracolumbrosacral spine: no gross bony pathology or tenderness to exam  Cardiovascular: S1S2 Present  Chest: Tenderness to palpitation over Lt posterior thoracic region. . No sternal pathology or tenderness to exam. No crepitus, no ecchymosis, no hematoma. No penetrating thorcoabdominal trauma  Respiratory:  Respiratory Effort normal; no wheezes, rales or rhonchi to exam  ABD: bowel sounds (+), soft, nontender, non distended, no rebound, no guarding, no rigidity, no skin changes to exam. No pelvic instability to exam, no skin changes  Musculoskeletal: Pt has palpable b/l radial, femoral, dorsalis pedis pulses. All digits are warm and well perfused. No gross long bone pathology or tenderness to exam. Pt demonstrates grossly intact sensoromotor function. Pt has good capillary refill to digits, no calf edema or tenderness to exam.  Skin: no lesions or rashes to exam  : no blood at meatus  Rectal: deferred

## 2024-04-23 NOTE — ED ADULT TRIAGE NOTE - CHIEF COMPLAINT QUOTE
Pt presents to the ED c/o hematuria and mild right flank pain x1 day. Denies dysuria, fevers, or nausea. Pt takes Xarelto daily. Pt presents to the ED c/o left rib pain radiating to her back s/p fall on Sunday. Pt saw Dr. Gibbons yesterday and was diagnosed with 3 rib fractures. Pt took Tylenol at 9am with minimal relief.

## 2024-04-25 RX ORDER — LEVOFLOXACIN 5 MG/ML
1 INJECTION, SOLUTION INTRAVENOUS
Qty: 10 | Refills: 0
Start: 2024-04-25 | End: 2024-04-29

## 2024-04-25 NOTE — ED POST DISCHARGE NOTE - RESULT SUMMARY
Returned patient's call stating RX was never sent to pharmacy. Sent levaquin for PNA as pt as PCN allergy. RX sent was BID instead of QD, pharmacy changed it. - Paulina Sheets PA-C

## 2024-05-21 NOTE — ED PROVIDER NOTE - NSDCPRINTRESULTS_ED_ALL_ED
21-May-2024 04:28 Patient requests all Lab, Cardiology, and Radiology Results on their Discharge Instructions

## 2024-06-13 ENCOUNTER — RX RENEWAL (OUTPATIENT)
Age: 89
End: 2024-06-13

## 2024-06-13 RX ORDER — LINACLOTIDE 72 UG/1
72 CAPSULE, GELATIN COATED ORAL
Qty: 30 | Refills: 3 | Status: ACTIVE | COMMUNITY
Start: 2023-10-19 | End: 1900-01-01

## 2024-10-02 ENCOUNTER — TRANSCRIPTION ENCOUNTER (OUTPATIENT)
Age: 89
End: 2024-10-02

## 2024-10-03 ENCOUNTER — TRANSCRIPTION ENCOUNTER (OUTPATIENT)
Age: 89
End: 2024-10-03

## 2024-11-13 NOTE — PATIENT PROFILE ADULT - MEDICATIONS/VISITS
Advance Care Planning     Advance Care Planning Activator (Inpatient)  Conversation Note      Date of ACP Conversation: 11/13/2024     Conversation Conducted with: Patient with Decision Making Capacity and healthcare decision maker Deandre Pravin    ACP Activator: Syeda Gresham, MSW, W        Health Care Decision Maker:     Current Designated Health Care Decision Maker:     Primary Decision Maker: Deandre Costello - Child - [phone]    Secondary Decision Maker: Hayes Antonio - Rock Child - [phone]  Click here to complete Healthcare Decision Makers including section of the Healthcare Decision Maker Relationship (ie \"Primary\")  Today we documented Decision Maker(s) consistent with ACP documents on file.    Care Preferences    Ventilation:  \"If you were in your present state of health and suddenly became very ill and were unable to breathe on your own, what would your preference be about the use of a ventilator (breathing machine) if it were available to you?\"      Would the patient desire the use of ventilator (breathing machine)?:  Son would like to discuss with his other siblings before making this decision.     \"If your health worsens and it becomes clear that your chance of recovery is unlikely, what would your preference be about the use of a ventilator (breathing machine) if it were available to you?\"     Would the patient desire the use of ventilator (breathing machine)?: See above      Resuscitation  \"CPR works best to restart the heart when there is a sudden event, like a heart attack, in someone who is otherwise healthy. Unfortunately, CPR does not typically restart the heart for people who have serious health conditions or who are very sick.\"    \"In the event your heart stopped as a result of an underlying serious health condition, would you want attempts to be made to restart your heart (answer \"yes\" for attempt to resuscitate) or would you prefer a natural death (answer \"no\" for do not attempt to 
no

## 2024-12-20 NOTE — ED ADULT TRIAGE NOTE - WEIGHT METHOD
Continued Stay Note  [facility]     Patient Name: Brittani Lambert  MRN: [id number]  Today's Date: 12/20/2024    Admit Date: 12/16/2024    Plan: [facility]   Discharge Plan       Row Name 12/20/24 1509       Plan    Plan [facility]    Patient/Family in Agreement with Plan yes    Plan Comments  spoke with Karen/Mei, they do not have a bed available at [facility]. CM spoke with pt, at the bedside, and updated her on [facility]. CM reviewed Pt Choice list with Ms. Lambert. She would like referrals to [facility], [facility], [facility], and [facility]. CM spoke with [facility], they are out of network. CM spoke with Natalia/[facility], faxed referral. CM spoke with Callie/PM and . Callie can offer pt a bed at [facility]. CM updated pt, at bedside. She is agreeable. Pt states that she will need transportation. CM updated Callie/PM, they will start insurance precer today. Callie will contact CM, over the weekend, if pt's insurance approval comes through. CM spoke with [employer]. [employer] van set up for Saturday, 12/21/24, at 1330, for now. If pt's ins approval does not come through on Saturday, CM will move [employer] to Sunday.  will continue to follow.    Final Discharge Disposition Code 03 - skilled nursing facility (SNF)                   Discharge Codes    No documentation.                 Expected Discharge Date and Time       Expected Discharge Date Expected Discharge Time    Dec 23, 2024               Janice Barkley RN     actual